# Patient Record
Sex: FEMALE | Race: WHITE | NOT HISPANIC OR LATINO | Employment: OTHER | URBAN - METROPOLITAN AREA
[De-identification: names, ages, dates, MRNs, and addresses within clinical notes are randomized per-mention and may not be internally consistent; named-entity substitution may affect disease eponyms.]

---

## 2021-10-22 ENCOUNTER — HOSPITAL ENCOUNTER (INPATIENT)
Facility: HOSPITAL | Age: 61
LOS: 4 days | Discharge: HOME | End: 2021-10-27
Attending: EMERGENCY MEDICINE | Admitting: INTERNAL MEDICINE

## 2021-10-22 DIAGNOSIS — Z78.9 DECREASED ACTIVITIES OF DAILY LIVING (ADL): ICD-10-CM

## 2021-10-22 DIAGNOSIS — C34.00 MALIGNANT NEOPLASM OF HILUS OF LUNG, UNSPECIFIED LATERALITY (HCC): Primary | ICD-10-CM

## 2021-10-22 DIAGNOSIS — R26.2 DIFFICULTY WALKING: ICD-10-CM

## 2021-10-22 DIAGNOSIS — F10.931 ALCOHOL WITHDRAWAL DELIRIUM (HCC): ICD-10-CM

## 2021-10-22 DIAGNOSIS — F19.10 SUBSTANCE ABUSE (HCC): ICD-10-CM

## 2021-10-22 DIAGNOSIS — F10.229 ALCOHOL INTOXICATION IN ACTIVE ALCOHOLIC WITH COMPLICATION (HCC): ICD-10-CM

## 2021-10-22 LAB
ALBUMIN SERPL-MCNC: 4.7 G/DL (ref 3.5–5.2)
ALBUMIN/GLOB SERPL: 1.6 G/DL
ALP SERPL-CCNC: 111 U/L (ref 39–117)
ALT SERPL W P-5'-P-CCNC: 13 U/L (ref 1–33)
ANION GAP SERPL CALCULATED.3IONS-SCNC: 20.5 MMOL/L (ref 5–15)
APAP SERPL-MCNC: <5 MCG/ML (ref 0–30)
AST SERPL-CCNC: 26 U/L (ref 1–32)
BASOPHILS # BLD AUTO: 0.07 10*3/MM3 (ref 0–0.2)
BASOPHILS NFR BLD AUTO: 1.2 % (ref 0–1.5)
BILIRUB SERPL-MCNC: 0.5 MG/DL (ref 0–1.2)
BUN SERPL-MCNC: 6 MG/DL (ref 6–20)
BUN/CREAT SERPL: 8.8 (ref 7–25)
CALCIUM SPEC-SCNC: 8.9 MG/DL (ref 8.6–10.5)
CHLORIDE SERPL-SCNC: 103 MMOL/L (ref 98–107)
CO2 SERPL-SCNC: 21.5 MMOL/L (ref 22–29)
CREAT SERPL-MCNC: 0.68 MG/DL (ref 0.57–1)
DEPRECATED RDW RBC AUTO: 46 FL (ref 37–54)
EOSINOPHIL # BLD AUTO: 0.05 10*3/MM3 (ref 0–0.4)
EOSINOPHIL NFR BLD AUTO: 0.8 % (ref 0.3–6.2)
ERYTHROCYTE [DISTWIDTH] IN BLOOD BY AUTOMATED COUNT: 13.5 % (ref 12.3–15.4)
ETHANOL BLD-MCNC: 300 MG/DL (ref 0–10)
ETHANOL UR QL: 0.3 %
GFR SERPL CREATININE-BSD FRML MDRD: 89 ML/MIN/1.73
GLOBULIN UR ELPH-MCNC: 3 GM/DL
GLUCOSE SERPL-MCNC: 88 MG/DL (ref 65–99)
HCG INTACT+B SERPL-ACNC: <0.5 MIU/ML
HCT VFR BLD AUTO: 44.9 % (ref 34–46.6)
HGB BLD-MCNC: 15.2 G/DL (ref 12–15.9)
HOLD SPECIMEN: NORMAL
HOLD SPECIMEN: NORMAL
IMM GRANULOCYTES # BLD AUTO: 0.01 10*3/MM3 (ref 0–0.05)
IMM GRANULOCYTES NFR BLD AUTO: 0.2 % (ref 0–0.5)
LYMPHOCYTES # BLD AUTO: 2.92 10*3/MM3 (ref 0.7–3.1)
LYMPHOCYTES NFR BLD AUTO: 49.3 % (ref 19.6–45.3)
MCH RBC QN AUTO: 31.2 PG (ref 26.6–33)
MCHC RBC AUTO-ENTMCNC: 33.9 G/DL (ref 31.5–35.7)
MCV RBC AUTO: 92.2 FL (ref 79–97)
MONOCYTES # BLD AUTO: 0.36 10*3/MM3 (ref 0.1–0.9)
MONOCYTES NFR BLD AUTO: 6.1 % (ref 5–12)
NEUTROPHILS NFR BLD AUTO: 2.51 10*3/MM3 (ref 1.7–7)
NEUTROPHILS NFR BLD AUTO: 42.4 % (ref 42.7–76)
NRBC BLD AUTO-RTO: 0 /100 WBC (ref 0–0.2)
PLATELET # BLD AUTO: 292 10*3/MM3 (ref 140–450)
PMV BLD AUTO: 8.8 FL (ref 6–12)
POTASSIUM SERPL-SCNC: 3.2 MMOL/L (ref 3.5–5.2)
PROT SERPL-MCNC: 7.7 G/DL (ref 6–8.5)
RBC # BLD AUTO: 4.87 10*6/MM3 (ref 3.77–5.28)
SALICYLATES SERPL-MCNC: <0.3 MG/DL
SODIUM SERPL-SCNC: 145 MMOL/L (ref 136–145)
T4 FREE SERPL-MCNC: 0.49 NG/DL (ref 0.93–1.7)
TSH SERPL DL<=0.05 MIU/L-ACNC: 46.77 UIU/ML (ref 0.27–4.2)
WBC # BLD AUTO: 5.92 10*3/MM3 (ref 3.4–10.8)
WHOLE BLOOD HOLD SPECIMEN: NORMAL
WHOLE BLOOD HOLD SPECIMEN: NORMAL

## 2021-10-22 PROCEDURE — 85025 COMPLETE CBC W/AUTO DIFF WBC: CPT | Performed by: EMERGENCY MEDICINE

## 2021-10-22 PROCEDURE — 25010000002 ONDANSETRON PER 1 MG: Performed by: NURSE PRACTITIONER

## 2021-10-22 PROCEDURE — 84702 CHORIONIC GONADOTROPIN TEST: CPT | Performed by: NURSE PRACTITIONER

## 2021-10-22 PROCEDURE — 80143 DRUG ASSAY ACETAMINOPHEN: CPT | Performed by: EMERGENCY MEDICINE

## 2021-10-22 PROCEDURE — 80179 DRUG ASSAY SALICYLATE: CPT | Performed by: EMERGENCY MEDICINE

## 2021-10-22 PROCEDURE — 25010000002 LORAZEPAM PER 2 MG: Performed by: EMERGENCY MEDICINE

## 2021-10-22 PROCEDURE — 84443 ASSAY THYROID STIM HORMONE: CPT | Performed by: NURSE PRACTITIONER

## 2021-10-22 PROCEDURE — 99284 EMERGENCY DEPT VISIT MOD MDM: CPT

## 2021-10-22 PROCEDURE — 80053 COMPREHEN METABOLIC PANEL: CPT | Performed by: EMERGENCY MEDICINE

## 2021-10-22 PROCEDURE — 25010000002 THIAMINE PER 100 MG: Performed by: NURSE PRACTITIONER

## 2021-10-22 PROCEDURE — 84439 ASSAY OF FREE THYROXINE: CPT | Performed by: NURSE PRACTITIONER

## 2021-10-22 PROCEDURE — 82077 ASSAY SPEC XCP UR&BREATH IA: CPT | Performed by: EMERGENCY MEDICINE

## 2021-10-22 RX ORDER — SODIUM CHLORIDE 0.9 % (FLUSH) 0.9 %
10 SYRINGE (ML) INJECTION AS NEEDED
Status: DISCONTINUED | OUTPATIENT
Start: 2021-10-22 | End: 2021-10-27 | Stop reason: HOSPADM

## 2021-10-22 RX ORDER — NICOTINE 21 MG/24HR
1 PATCH, TRANSDERMAL 24 HOURS TRANSDERMAL
Status: DISCONTINUED | OUTPATIENT
Start: 2021-10-23 | End: 2021-10-22

## 2021-10-22 RX ORDER — THIAMINE HYDROCHLORIDE 100 MG/ML
100 INJECTION, SOLUTION INTRAMUSCULAR; INTRAVENOUS ONCE
Status: COMPLETED | OUTPATIENT
Start: 2021-10-22 | End: 2021-10-22

## 2021-10-22 RX ORDER — LORAZEPAM 2 MG/ML
1 INJECTION INTRAMUSCULAR ONCE
Status: COMPLETED | OUTPATIENT
Start: 2021-10-22 | End: 2021-10-22

## 2021-10-22 RX ORDER — ONDANSETRON 2 MG/ML
4 INJECTION INTRAMUSCULAR; INTRAVENOUS ONCE
Status: COMPLETED | OUTPATIENT
Start: 2021-10-22 | End: 2021-10-22

## 2021-10-22 RX ORDER — NICOTINE 21 MG/24HR
1 PATCH, TRANSDERMAL 24 HOURS TRANSDERMAL ONCE
Status: COMPLETED | OUTPATIENT
Start: 2021-10-22 | End: 2021-10-23

## 2021-10-22 RX ADMIN — SODIUM CHLORIDE 500 ML: 9 INJECTION, SOLUTION INTRAVENOUS at 21:39

## 2021-10-22 RX ADMIN — THIAMINE HYDROCHLORIDE 100 MG: 100 INJECTION, SOLUTION INTRAMUSCULAR; INTRAVENOUS at 21:39

## 2021-10-22 RX ADMIN — SODIUM CHLORIDE 500 ML: 9 INJECTION, SOLUTION INTRAVENOUS at 23:21

## 2021-10-22 RX ADMIN — ONDANSETRON 4 MG: 2 INJECTION INTRAMUSCULAR; INTRAVENOUS at 21:38

## 2021-10-22 RX ADMIN — LORAZEPAM 1 MG: 2 INJECTION INTRAMUSCULAR; INTRAVENOUS at 23:21

## 2021-10-22 RX ADMIN — FOLIC ACID 1 MG: 5 INJECTION, SOLUTION INTRAMUSCULAR; INTRAVENOUS; SUBCUTANEOUS at 21:39

## 2021-10-22 RX ADMIN — NICOTINE 1 PATCH: 21 PATCH, EXTENDED RELEASE TRANSDERMAL at 23:04

## 2021-10-23 PROBLEM — R82.5 POSITIVE URINE DRUG SCREEN: Status: ACTIVE | Noted: 2021-10-23

## 2021-10-23 PROBLEM — F11.90 CHRONIC NARCOTIC USE: Chronic | Status: ACTIVE | Noted: 2021-10-23

## 2021-10-23 PROBLEM — E03.9 HYPOTHYROIDISM (ACQUIRED): Chronic | Status: ACTIVE | Noted: 2021-10-23

## 2021-10-23 PROBLEM — I10 PRIMARY HYPERTENSION: Chronic | Status: ACTIVE | Noted: 2021-10-23

## 2021-10-23 PROBLEM — Z72.0 TOBACCO ABUSE: Status: ACTIVE | Noted: 2021-10-23

## 2021-10-23 PROBLEM — F10.930 ALCOHOL WITHDRAWAL SYNDROME WITHOUT COMPLICATION (HCC): Status: ACTIVE | Noted: 2021-10-23

## 2021-10-23 PROBLEM — E87.6 HYPOKALEMIA: Status: ACTIVE | Noted: 2021-10-23

## 2021-10-23 PROBLEM — F10.220 ALCOHOL INTOXICATION IN ACTIVE ALCOHOLIC WITHOUT COMPLICATION: Status: ACTIVE | Noted: 2021-10-23

## 2021-10-23 LAB
ALBUMIN SERPL-MCNC: 4.4 G/DL (ref 3.5–5.2)
ALBUMIN/GLOB SERPL: 1.7 G/DL
ALP SERPL-CCNC: 101 U/L (ref 39–117)
ALT SERPL W P-5'-P-CCNC: 11 U/L (ref 1–33)
AMPHET+METHAMPHET UR QL: POSITIVE
ANION GAP SERPL CALCULATED.3IONS-SCNC: 18.3 MMOL/L (ref 5–15)
AST SERPL-CCNC: 23 U/L (ref 1–32)
BACTERIA UR QL AUTO: ABNORMAL /HPF
BARBITURATES UR QL SCN: NEGATIVE
BENZODIAZ UR QL SCN: NEGATIVE
BILIRUB SERPL-MCNC: 0.7 MG/DL (ref 0–1.2)
BILIRUB UR QL STRIP: NEGATIVE
BUN SERPL-MCNC: 6 MG/DL (ref 6–20)
BUN/CREAT SERPL: 8.8 (ref 7–25)
CALCIUM SPEC-SCNC: 8.3 MG/DL (ref 8.6–10.5)
CANNABINOIDS SERPL QL: NEGATIVE
CHLORIDE SERPL-SCNC: 106 MMOL/L (ref 98–107)
CLARITY UR: CLEAR
CO2 SERPL-SCNC: 20.7 MMOL/L (ref 22–29)
COCAINE UR QL: NEGATIVE
COLOR UR: YELLOW
CREAT SERPL-MCNC: 0.68 MG/DL (ref 0.57–1)
ETHANOL BLD-MCNC: 87 MG/DL (ref 0–10)
ETHANOL UR QL: 0.09 %
GFR SERPL CREATININE-BSD FRML MDRD: 89 ML/MIN/1.73
GLOBULIN UR ELPH-MCNC: 2.6 GM/DL
GLUCOSE SERPL-MCNC: 80 MG/DL (ref 65–99)
GLUCOSE UR STRIP-MCNC: NEGATIVE MG/DL
HGB UR QL STRIP.AUTO: ABNORMAL
HYALINE CASTS UR QL AUTO: ABNORMAL /LPF
KETONES UR QL STRIP: NEGATIVE
LEUKOCYTE ESTERASE UR QL STRIP.AUTO: NEGATIVE
MAGNESIUM SERPL-MCNC: 2 MG/DL (ref 1.6–2.6)
METHADONE UR QL SCN: NEGATIVE
NITRITE UR QL STRIP: NEGATIVE
OPIATES UR QL: NEGATIVE
OXYCODONE UR QL SCN: NEGATIVE
PH UR STRIP.AUTO: 5.5 [PH] (ref 5–8)
PHOSPHATE SERPL-MCNC: 2.5 MG/DL (ref 2.5–4.5)
POTASSIUM SERPL-SCNC: 3.2 MMOL/L (ref 3.5–5.2)
PROT SERPL-MCNC: 7 G/DL (ref 6–8.5)
PROT UR QL STRIP: NEGATIVE
RBC # UR: ABNORMAL /HPF
REF LAB TEST METHOD: ABNORMAL
SODIUM SERPL-SCNC: 145 MMOL/L (ref 136–145)
SP GR UR STRIP: 1.01 (ref 1–1.03)
SQUAMOUS #/AREA URNS HPF: ABNORMAL /HPF
UROBILINOGEN UR QL STRIP: ABNORMAL
WBC UR QL AUTO: ABNORMAL /HPF

## 2021-10-23 PROCEDURE — 80307 DRUG TEST PRSMV CHEM ANLYZR: CPT | Performed by: EMERGENCY MEDICINE

## 2021-10-23 PROCEDURE — 82077 ASSAY SPEC XCP UR&BREATH IA: CPT | Performed by: NURSE PRACTITIONER

## 2021-10-23 PROCEDURE — 25010000002 ONDANSETRON PER 1 MG: Performed by: NURSE PRACTITIONER

## 2021-10-23 PROCEDURE — 25010000002 LORAZEPAM PER 2 MG: Performed by: EMERGENCY MEDICINE

## 2021-10-23 PROCEDURE — 83735 ASSAY OF MAGNESIUM: CPT | Performed by: INTERNAL MEDICINE

## 2021-10-23 PROCEDURE — 84100 ASSAY OF PHOSPHORUS: CPT | Performed by: INTERNAL MEDICINE

## 2021-10-23 PROCEDURE — 80053 COMPREHEN METABOLIC PANEL: CPT | Performed by: NURSE PRACTITIONER

## 2021-10-23 PROCEDURE — 99222 1ST HOSP IP/OBS MODERATE 55: CPT | Performed by: INTERNAL MEDICINE

## 2021-10-23 PROCEDURE — 81001 URINALYSIS AUTO W/SCOPE: CPT | Performed by: NURSE PRACTITIONER

## 2021-10-23 PROCEDURE — 63710000001 PROMETHAZINE PER 25 MG: Performed by: INTERNAL MEDICINE

## 2021-10-23 PROCEDURE — 25010000002 LORAZEPAM PER 2 MG: Performed by: INTERNAL MEDICINE

## 2021-10-23 PROCEDURE — 25010000002 ONDANSETRON PER 1 MG: Performed by: EMERGENCY MEDICINE

## 2021-10-23 RX ORDER — LORAZEPAM 2 MG/ML
0.5 INJECTION INTRAMUSCULAR ONCE
Status: COMPLETED | OUTPATIENT
Start: 2021-10-23 | End: 2021-10-23

## 2021-10-23 RX ORDER — ASPIRIN 81 MG/1
81 TABLET, CHEWABLE ORAL DAILY
COMMUNITY
Start: 2021-07-21 | End: 2022-03-11

## 2021-10-23 RX ORDER — ONDANSETRON 2 MG/ML
4 INJECTION INTRAMUSCULAR; INTRAVENOUS ONCE
Status: COMPLETED | OUTPATIENT
Start: 2021-10-23 | End: 2021-10-23

## 2021-10-23 RX ORDER — ACETAMINOPHEN 325 MG/1
650 TABLET ORAL EVERY 4 HOURS PRN
Status: DISCONTINUED | OUTPATIENT
Start: 2021-10-23 | End: 2021-10-27 | Stop reason: HOSPADM

## 2021-10-23 RX ORDER — LORAZEPAM 2 MG/1
2 TABLET ORAL
Status: DISCONTINUED | OUTPATIENT
Start: 2021-10-23 | End: 2021-10-25

## 2021-10-23 RX ORDER — HYDROCODONE BITARTRATE AND ACETAMINOPHEN 7.5; 325 MG/1; MG/1
1 TABLET ORAL ONCE
Status: COMPLETED | OUTPATIENT
Start: 2021-10-23 | End: 2021-10-23

## 2021-10-23 RX ORDER — LORAZEPAM 2 MG/ML
0.5 INJECTION INTRAMUSCULAR ONCE
Status: DISCONTINUED | OUTPATIENT
Start: 2021-10-23 | End: 2021-10-23

## 2021-10-23 RX ORDER — LORAZEPAM 2 MG/ML
1 INJECTION INTRAMUSCULAR ONCE
Status: DISCONTINUED | OUTPATIENT
Start: 2021-10-23 | End: 2021-10-23

## 2021-10-23 RX ORDER — POTASSIUM CHLORIDE 750 MG/1
40 CAPSULE, EXTENDED RELEASE ORAL ONCE
Status: COMPLETED | OUTPATIENT
Start: 2021-10-23 | End: 2021-10-23

## 2021-10-23 RX ORDER — ALBUTEROL SULFATE 90 UG/1
2 AEROSOL, METERED RESPIRATORY (INHALATION) EVERY 6 HOURS PRN
COMMUNITY
End: 2022-08-25 | Stop reason: SDUPTHER

## 2021-10-23 RX ORDER — CITALOPRAM 20 MG/1
20 TABLET ORAL DAILY
COMMUNITY

## 2021-10-23 RX ORDER — LISINOPRIL 20 MG/1
20 TABLET ORAL DAILY
COMMUNITY
Start: 2021-07-17 | End: 2022-08-25 | Stop reason: SDUPTHER

## 2021-10-23 RX ORDER — AMOXICILLIN 250 MG
2 CAPSULE ORAL 2 TIMES DAILY
Status: DISCONTINUED | OUTPATIENT
Start: 2021-10-23 | End: 2021-10-23

## 2021-10-23 RX ORDER — LEVOTHYROXINE SODIUM 0.1 MG/1
100 TABLET ORAL DAILY
Status: DISCONTINUED | OUTPATIENT
Start: 2021-10-24 | End: 2021-10-27 | Stop reason: HOSPADM

## 2021-10-23 RX ORDER — PROMETHAZINE HYDROCHLORIDE 25 MG/1
25 TABLET ORAL EVERY 6 HOURS PRN
COMMUNITY
End: 2022-03-11

## 2021-10-23 RX ORDER — LORAZEPAM 2 MG/ML
1 INJECTION INTRAMUSCULAR
Status: DISCONTINUED | OUTPATIENT
Start: 2021-10-23 | End: 2021-10-25

## 2021-10-23 RX ORDER — PROMETHAZINE HYDROCHLORIDE 25 MG/1
25 TABLET ORAL EVERY 6 HOURS PRN
Status: DISCONTINUED | OUTPATIENT
Start: 2021-10-23 | End: 2021-10-27 | Stop reason: HOSPADM

## 2021-10-23 RX ORDER — LORAZEPAM 2 MG/ML
2 INJECTION INTRAMUSCULAR
Status: DISCONTINUED | OUTPATIENT
Start: 2021-10-23 | End: 2021-10-25

## 2021-10-23 RX ORDER — LEVOTHYROXINE SODIUM 0.1 MG/1
100 TABLET ORAL DAILY
COMMUNITY
End: 2021-10-27 | Stop reason: HOSPADM

## 2021-10-23 RX ORDER — METHION/INOS/CHOL BT/B COM/LIV 110MG-86MG
100 CAPSULE ORAL DAILY
Status: DISCONTINUED | OUTPATIENT
Start: 2021-10-24 | End: 2021-10-27 | Stop reason: HOSPADM

## 2021-10-23 RX ORDER — FUROSEMIDE 40 MG/1
80 TABLET ORAL 2 TIMES DAILY
COMMUNITY
End: 2022-08-25 | Stop reason: SDUPTHER

## 2021-10-23 RX ORDER — ATORVASTATIN CALCIUM 40 MG/1
40 TABLET, FILM COATED ORAL DAILY
COMMUNITY
Start: 2021-07-17 | End: 2022-08-25 | Stop reason: SDUPTHER

## 2021-10-23 RX ORDER — LORAZEPAM 0.5 MG/1
1 TABLET ORAL
Status: DISCONTINUED | OUTPATIENT
Start: 2021-10-23 | End: 2021-10-25

## 2021-10-23 RX ORDER — OXYCODONE AND ACETAMINOPHEN 10; 325 MG/1; MG/1
1 TABLET ORAL EVERY 6 HOURS PRN
Status: DISCONTINUED | OUTPATIENT
Start: 2021-10-23 | End: 2021-10-27 | Stop reason: HOSPADM

## 2021-10-23 RX ORDER — HYDROCODONE BITARTRATE AND ACETAMINOPHEN 7.5; 325 MG/1; MG/1
1 TABLET ORAL ONCE
Status: DISCONTINUED | OUTPATIENT
Start: 2021-10-23 | End: 2021-10-23

## 2021-10-23 RX ORDER — BISACODYL 5 MG/1
5 TABLET, DELAYED RELEASE ORAL DAILY PRN
Status: DISCONTINUED | OUTPATIENT
Start: 2021-10-23 | End: 2021-10-27 | Stop reason: HOSPADM

## 2021-10-23 RX ORDER — SODIUM CHLORIDE 0.9 % (FLUSH) 0.9 %
10 SYRINGE (ML) INJECTION EVERY 12 HOURS SCHEDULED
Status: DISCONTINUED | OUTPATIENT
Start: 2021-10-23 | End: 2021-10-27 | Stop reason: HOSPADM

## 2021-10-23 RX ORDER — ONDANSETRON 4 MG/1
4 TABLET, FILM COATED ORAL EVERY 6 HOURS PRN
Status: DISCONTINUED | OUTPATIENT
Start: 2021-10-23 | End: 2021-10-27 | Stop reason: HOSPADM

## 2021-10-23 RX ORDER — DILTIAZEM HYDROCHLORIDE 180 MG/1
180 CAPSULE, COATED, EXTENDED RELEASE ORAL DAILY
COMMUNITY
End: 2022-09-26

## 2021-10-23 RX ORDER — DILTIAZEM HYDROCHLORIDE 180 MG/1
180 CAPSULE, COATED, EXTENDED RELEASE ORAL DAILY
Status: DISCONTINUED | OUTPATIENT
Start: 2021-10-24 | End: 2021-10-27 | Stop reason: HOSPADM

## 2021-10-23 RX ORDER — BISACODYL 10 MG
10 SUPPOSITORY, RECTAL RECTAL DAILY PRN
Status: DISCONTINUED | OUTPATIENT
Start: 2021-10-23 | End: 2021-10-27 | Stop reason: HOSPADM

## 2021-10-23 RX ORDER — SODIUM CHLORIDE 0.9 % (FLUSH) 0.9 %
10 SYRINGE (ML) INJECTION AS NEEDED
Status: DISCONTINUED | OUTPATIENT
Start: 2021-10-23 | End: 2021-10-27 | Stop reason: HOSPADM

## 2021-10-23 RX ORDER — ATORVASTATIN CALCIUM 40 MG/1
40 TABLET, FILM COATED ORAL DAILY
Status: DISCONTINUED | OUTPATIENT
Start: 2021-10-24 | End: 2021-10-27 | Stop reason: HOSPADM

## 2021-10-23 RX ORDER — MULTIPLE VITAMINS W/ MINERALS TAB 9MG-400MCG
1 TAB ORAL DAILY
Status: DISCONTINUED | OUTPATIENT
Start: 2021-10-24 | End: 2021-10-27 | Stop reason: HOSPADM

## 2021-10-23 RX ORDER — ASPIRIN 81 MG/1
81 TABLET, CHEWABLE ORAL DAILY
Status: DISCONTINUED | OUTPATIENT
Start: 2021-10-24 | End: 2021-10-27 | Stop reason: HOSPADM

## 2021-10-23 RX ORDER — FOLIC ACID 1 MG/1
1 TABLET ORAL DAILY
Status: DISCONTINUED | OUTPATIENT
Start: 2021-10-24 | End: 2021-10-27 | Stop reason: HOSPADM

## 2021-10-23 RX ORDER — CITALOPRAM 20 MG/1
10 TABLET ORAL DAILY
Status: DISCONTINUED | OUTPATIENT
Start: 2021-10-24 | End: 2021-10-27 | Stop reason: HOSPADM

## 2021-10-23 RX ORDER — ALPRAZOLAM 1 MG/1
1 TABLET ORAL 3 TIMES DAILY
COMMUNITY
Start: 2021-07-17 | End: 2021-10-27 | Stop reason: HOSPADM

## 2021-10-23 RX ORDER — POLYETHYLENE GLYCOL 3350 17 G/17G
17 POWDER, FOR SOLUTION ORAL DAILY PRN
Status: DISCONTINUED | OUTPATIENT
Start: 2021-10-23 | End: 2021-10-27 | Stop reason: HOSPADM

## 2021-10-23 RX ORDER — OXYCODONE AND ACETAMINOPHEN 10; 325 MG/1; MG/1
1 TABLET ORAL EVERY 6 HOURS PRN
COMMUNITY
End: 2021-10-27 | Stop reason: HOSPADM

## 2021-10-23 RX ADMIN — SODIUM CHLORIDE, PRESERVATIVE FREE 10 ML: 5 INJECTION INTRAVENOUS at 23:24

## 2021-10-23 RX ADMIN — LORAZEPAM 0.5 MG: 2 INJECTION INTRAMUSCULAR; INTRAVENOUS at 07:34

## 2021-10-23 RX ADMIN — LORAZEPAM 2 MG: 2 TABLET ORAL at 12:50

## 2021-10-23 RX ADMIN — ACETAMINOPHEN 650 MG: 325 TABLET ORAL at 11:11

## 2021-10-23 RX ADMIN — SODIUM CHLORIDE, PRESERVATIVE FREE 10 ML: 5 INJECTION INTRAVENOUS at 11:11

## 2021-10-23 RX ADMIN — POTASSIUM CHLORIDE 40 MEQ: 750 CAPSULE, EXTENDED RELEASE ORAL at 11:11

## 2021-10-23 RX ADMIN — LORAZEPAM 0.5 MG: 2 INJECTION INTRAMUSCULAR; INTRAVENOUS at 04:42

## 2021-10-23 RX ADMIN — HYDROCODONE BITARTRATE AND ACETAMINOPHEN 1 TABLET: 7.5; 325 TABLET ORAL at 07:34

## 2021-10-23 RX ADMIN — ONDANSETRON 4 MG: 2 INJECTION INTRAMUSCULAR; INTRAVENOUS at 02:39

## 2021-10-23 RX ADMIN — LORAZEPAM 1 MG: 2 INJECTION INTRAMUSCULAR; INTRAVENOUS at 20:09

## 2021-10-23 RX ADMIN — PROMETHAZINE HYDROCHLORIDE 25 MG: 25 TABLET ORAL at 20:15

## 2021-10-23 RX ADMIN — ONDANSETRON 4 MG: 2 INJECTION INTRAMUSCULAR; INTRAVENOUS at 04:40

## 2021-10-24 PROBLEM — E83.39 HYPOPHOSPHATEMIA: Status: ACTIVE | Noted: 2021-10-24

## 2021-10-24 LAB
ANION GAP SERPL CALCULATED.3IONS-SCNC: 8.1 MMOL/L (ref 5–15)
BASOPHILS # BLD AUTO: 0.03 10*3/MM3 (ref 0–0.2)
BASOPHILS NFR BLD AUTO: 0.6 % (ref 0–1.5)
BUN SERPL-MCNC: 8 MG/DL (ref 6–20)
BUN/CREAT SERPL: 9.5 (ref 7–25)
CALCIUM SPEC-SCNC: 8.5 MG/DL (ref 8.6–10.5)
CHLORIDE SERPL-SCNC: 100 MMOL/L (ref 98–107)
CO2 SERPL-SCNC: 26.9 MMOL/L (ref 22–29)
CREAT SERPL-MCNC: 0.84 MG/DL (ref 0.57–1)
DEPRECATED RDW RBC AUTO: 45.1 FL (ref 37–54)
EOSINOPHIL # BLD AUTO: 0.11 10*3/MM3 (ref 0–0.4)
EOSINOPHIL NFR BLD AUTO: 2.2 % (ref 0.3–6.2)
ERYTHROCYTE [DISTWIDTH] IN BLOOD BY AUTOMATED COUNT: 13.2 % (ref 12.3–15.4)
GFR SERPL CREATININE-BSD FRML MDRD: 69 ML/MIN/1.73
GLUCOSE SERPL-MCNC: 93 MG/DL (ref 65–99)
HCT VFR BLD AUTO: 41.4 % (ref 34–46.6)
HGB BLD-MCNC: 13.9 G/DL (ref 12–15.9)
IMM GRANULOCYTES # BLD AUTO: 0.01 10*3/MM3 (ref 0–0.05)
IMM GRANULOCYTES NFR BLD AUTO: 0.2 % (ref 0–0.5)
LYMPHOCYTES # BLD AUTO: 2.13 10*3/MM3 (ref 0.7–3.1)
LYMPHOCYTES NFR BLD AUTO: 43 % (ref 19.6–45.3)
MAGNESIUM SERPL-MCNC: 1.8 MG/DL (ref 1.6–2.6)
MCH RBC QN AUTO: 31.4 PG (ref 26.6–33)
MCHC RBC AUTO-ENTMCNC: 33.6 G/DL (ref 31.5–35.7)
MCV RBC AUTO: 93.7 FL (ref 79–97)
MONOCYTES # BLD AUTO: 0.28 10*3/MM3 (ref 0.1–0.9)
MONOCYTES NFR BLD AUTO: 5.7 % (ref 5–12)
NEUTROPHILS NFR BLD AUTO: 2.39 10*3/MM3 (ref 1.7–7)
NEUTROPHILS NFR BLD AUTO: 48.3 % (ref 42.7–76)
NRBC BLD AUTO-RTO: 0 /100 WBC (ref 0–0.2)
PHOSPHATE SERPL-MCNC: 1.8 MG/DL (ref 2.5–4.5)
PLATELET # BLD AUTO: 212 10*3/MM3 (ref 140–450)
PMV BLD AUTO: 9.2 FL (ref 6–12)
POTASSIUM SERPL-SCNC: 3.7 MMOL/L (ref 3.5–5.2)
RBC # BLD AUTO: 4.42 10*6/MM3 (ref 3.77–5.28)
SODIUM SERPL-SCNC: 135 MMOL/L (ref 136–145)
WBC # BLD AUTO: 4.95 10*3/MM3 (ref 3.4–10.8)

## 2021-10-24 PROCEDURE — 99233 SBSQ HOSP IP/OBS HIGH 50: CPT | Performed by: INTERNAL MEDICINE

## 2021-10-24 PROCEDURE — 25010000002 LORAZEPAM PER 2 MG: Performed by: INTERNAL MEDICINE

## 2021-10-24 PROCEDURE — 85025 COMPLETE CBC W/AUTO DIFF WBC: CPT | Performed by: INTERNAL MEDICINE

## 2021-10-24 PROCEDURE — 84100 ASSAY OF PHOSPHORUS: CPT | Performed by: INTERNAL MEDICINE

## 2021-10-24 PROCEDURE — 63710000001 ONDANSETRON PER 8 MG: Performed by: INTERNAL MEDICINE

## 2021-10-24 PROCEDURE — 83735 ASSAY OF MAGNESIUM: CPT | Performed by: INTERNAL MEDICINE

## 2021-10-24 PROCEDURE — 80048 BASIC METABOLIC PNL TOTAL CA: CPT | Performed by: INTERNAL MEDICINE

## 2021-10-24 PROCEDURE — 36415 COLL VENOUS BLD VENIPUNCTURE: CPT | Performed by: INTERNAL MEDICINE

## 2021-10-24 RX ORDER — FENTANYL/ROPIVACAINE/NS/PF 2-625MCG/1
15 PLASTIC BAG, INJECTION (ML) EPIDURAL ONCE
Status: COMPLETED | OUTPATIENT
Start: 2021-10-24 | End: 2021-10-24

## 2021-10-24 RX ADMIN — Medication 100 MG: at 08:48

## 2021-10-24 RX ADMIN — LORAZEPAM 1 MG: 2 INJECTION INTRAMUSCULAR; INTRAVENOUS at 01:51

## 2021-10-24 RX ADMIN — DILTIAZEM HYDROCHLORIDE 180 MG: 180 CAPSULE, COATED, EXTENDED RELEASE ORAL at 08:49

## 2021-10-24 RX ADMIN — LORAZEPAM 1 MG: 0.5 TABLET ORAL at 23:51

## 2021-10-24 RX ADMIN — LEVOTHYROXINE SODIUM 100 MCG: 0.1 TABLET ORAL at 08:49

## 2021-10-24 RX ADMIN — CITALOPRAM HYDROBROMIDE 10 MG: 20 TABLET ORAL at 08:49

## 2021-10-24 RX ADMIN — OXYCODONE HYDROCHLORIDE AND ACETAMINOPHEN 1 TABLET: 10; 325 TABLET ORAL at 21:15

## 2021-10-24 RX ADMIN — OXYCODONE HYDROCHLORIDE AND ACETAMINOPHEN 1 TABLET: 10; 325 TABLET ORAL at 14:38

## 2021-10-24 RX ADMIN — SODIUM CHLORIDE, PRESERVATIVE FREE 10 ML: 5 INJECTION INTRAVENOUS at 21:15

## 2021-10-24 RX ADMIN — ASPIRIN 81 MG: 81 TABLET, CHEWABLE ORAL at 08:49

## 2021-10-24 RX ADMIN — Medication 15 MMOL: at 10:00

## 2021-10-24 RX ADMIN — OXYCODONE HYDROCHLORIDE AND ACETAMINOPHEN 1 TABLET: 10; 325 TABLET ORAL at 05:11

## 2021-10-24 RX ADMIN — ATORVASTATIN CALCIUM 40 MG: 40 TABLET, FILM COATED ORAL at 08:49

## 2021-10-24 RX ADMIN — SODIUM CHLORIDE, PRESERVATIVE FREE 10 ML: 5 INJECTION INTRAVENOUS at 08:49

## 2021-10-24 RX ADMIN — ONDANSETRON HYDROCHLORIDE 4 MG: 4 TABLET, FILM COATED ORAL at 14:38

## 2021-10-24 RX ADMIN — ONDANSETRON HYDROCHLORIDE 4 MG: 4 TABLET, FILM COATED ORAL at 01:52

## 2021-10-24 RX ADMIN — MULTIPLE VITAMINS W/ MINERALS TAB 1 TABLET: TAB at 08:49

## 2021-10-24 RX ADMIN — FOLIC ACID 1 MG: 1 TABLET ORAL at 08:49

## 2021-10-25 LAB
ANION GAP SERPL CALCULATED.3IONS-SCNC: 12.7 MMOL/L (ref 5–15)
BUN SERPL-MCNC: 11 MG/DL (ref 6–20)
BUN/CREAT SERPL: 12.5 (ref 7–25)
CALCIUM SPEC-SCNC: 8.5 MG/DL (ref 8.6–10.5)
CHLORIDE SERPL-SCNC: 103 MMOL/L (ref 98–107)
CO2 SERPL-SCNC: 23.3 MMOL/L (ref 22–29)
CREAT SERPL-MCNC: 0.88 MG/DL (ref 0.57–1)
DEPRECATED RDW RBC AUTO: 46.1 FL (ref 37–54)
ERYTHROCYTE [DISTWIDTH] IN BLOOD BY AUTOMATED COUNT: 13.2 % (ref 12.3–15.4)
GFR SERPL CREATININE-BSD FRML MDRD: 66 ML/MIN/1.73
GLUCOSE SERPL-MCNC: 118 MG/DL (ref 65–99)
HCT VFR BLD AUTO: 39.9 % (ref 34–46.6)
HGB BLD-MCNC: 13 G/DL (ref 12–15.9)
MCH RBC QN AUTO: 31 PG (ref 26.6–33)
MCHC RBC AUTO-ENTMCNC: 32.6 G/DL (ref 31.5–35.7)
MCV RBC AUTO: 95.2 FL (ref 79–97)
PHOSPHATE SERPL-MCNC: 2.5 MG/DL (ref 2.5–4.5)
PLATELET # BLD AUTO: 198 10*3/MM3 (ref 140–450)
PMV BLD AUTO: 9.3 FL (ref 6–12)
POTASSIUM SERPL-SCNC: 3.7 MMOL/L (ref 3.5–5.2)
RBC # BLD AUTO: 4.19 10*6/MM3 (ref 3.77–5.28)
SODIUM SERPL-SCNC: 139 MMOL/L (ref 136–145)
WBC # BLD AUTO: 4.65 10*3/MM3 (ref 3.4–10.8)

## 2021-10-25 PROCEDURE — 84100 ASSAY OF PHOSPHORUS: CPT | Performed by: INTERNAL MEDICINE

## 2021-10-25 PROCEDURE — 97165 OT EVAL LOW COMPLEX 30 MIN: CPT

## 2021-10-25 PROCEDURE — 80048 BASIC METABOLIC PNL TOTAL CA: CPT | Performed by: INTERNAL MEDICINE

## 2021-10-25 PROCEDURE — 99232 SBSQ HOSP IP/OBS MODERATE 35: CPT | Performed by: INTERNAL MEDICINE

## 2021-10-25 PROCEDURE — 97161 PT EVAL LOW COMPLEX 20 MIN: CPT

## 2021-10-25 PROCEDURE — 85027 COMPLETE CBC AUTOMATED: CPT | Performed by: INTERNAL MEDICINE

## 2021-10-25 RX ORDER — NICOTINE 21 MG/24HR
1 PATCH, TRANSDERMAL 24 HOURS TRANSDERMAL
Status: DISCONTINUED | OUTPATIENT
Start: 2021-10-25 | End: 2021-10-27 | Stop reason: HOSPADM

## 2021-10-25 RX ORDER — ALPRAZOLAM 1 MG/1
1 TABLET ORAL 3 TIMES DAILY PRN
Status: DISCONTINUED | OUTPATIENT
Start: 2021-10-25 | End: 2021-10-27 | Stop reason: HOSPADM

## 2021-10-25 RX ADMIN — ALPRAZOLAM 1 MG: 1 TABLET ORAL at 14:42

## 2021-10-25 RX ADMIN — ATORVASTATIN CALCIUM 40 MG: 40 TABLET, FILM COATED ORAL at 09:44

## 2021-10-25 RX ADMIN — DILTIAZEM HYDROCHLORIDE 180 MG: 180 CAPSULE, COATED, EXTENDED RELEASE ORAL at 09:43

## 2021-10-25 RX ADMIN — NICOTINE 1 PATCH: 21 PATCH, EXTENDED RELEASE TRANSDERMAL at 14:42

## 2021-10-25 RX ADMIN — ALPRAZOLAM 1 MG: 1 TABLET ORAL at 22:37

## 2021-10-25 RX ADMIN — FOLIC ACID 1 MG: 1 TABLET ORAL at 09:44

## 2021-10-25 RX ADMIN — LEVOTHYROXINE SODIUM 100 MCG: 0.1 TABLET ORAL at 09:44

## 2021-10-25 RX ADMIN — ASPIRIN 81 MG: 81 TABLET, CHEWABLE ORAL at 09:43

## 2021-10-25 RX ADMIN — SODIUM CHLORIDE, PRESERVATIVE FREE 10 ML: 5 INJECTION INTRAVENOUS at 09:44

## 2021-10-25 RX ADMIN — SODIUM CHLORIDE, PRESERVATIVE FREE 10 ML: 5 INJECTION INTRAVENOUS at 20:03

## 2021-10-25 RX ADMIN — CITALOPRAM HYDROBROMIDE 10 MG: 20 TABLET ORAL at 09:44

## 2021-10-25 RX ADMIN — OXYCODONE HYDROCHLORIDE AND ACETAMINOPHEN 1 TABLET: 10; 325 TABLET ORAL at 09:43

## 2021-10-25 RX ADMIN — Medication 100 MG: at 09:43

## 2021-10-25 RX ADMIN — OXYCODONE HYDROCHLORIDE AND ACETAMINOPHEN 1 TABLET: 10; 325 TABLET ORAL at 15:57

## 2021-10-25 RX ADMIN — OXYCODONE HYDROCHLORIDE AND ACETAMINOPHEN 1 TABLET: 10; 325 TABLET ORAL at 21:27

## 2021-10-25 RX ADMIN — LORAZEPAM 1 MG: 0.5 TABLET ORAL at 01:48

## 2021-10-25 RX ADMIN — OXYCODONE HYDROCHLORIDE AND ACETAMINOPHEN 1 TABLET: 10; 325 TABLET ORAL at 03:18

## 2021-10-25 RX ADMIN — MULTIPLE VITAMINS W/ MINERALS TAB 1 TABLET: TAB at 09:44

## 2021-10-26 PROCEDURE — 99232 SBSQ HOSP IP/OBS MODERATE 35: CPT | Performed by: INTERNAL MEDICINE

## 2021-10-26 PROCEDURE — 63710000001 ONDANSETRON PER 8 MG: Performed by: INTERNAL MEDICINE

## 2021-10-26 RX ADMIN — OXYCODONE HYDROCHLORIDE AND ACETAMINOPHEN 1 TABLET: 10; 325 TABLET ORAL at 02:58

## 2021-10-26 RX ADMIN — OXYCODONE HYDROCHLORIDE AND ACETAMINOPHEN 1 TABLET: 10; 325 TABLET ORAL at 09:28

## 2021-10-26 RX ADMIN — OXYCODONE HYDROCHLORIDE AND ACETAMINOPHEN 1 TABLET: 10; 325 TABLET ORAL at 21:32

## 2021-10-26 RX ADMIN — ATORVASTATIN CALCIUM 40 MG: 40 TABLET, FILM COATED ORAL at 08:52

## 2021-10-26 RX ADMIN — DILTIAZEM HYDROCHLORIDE 180 MG: 180 CAPSULE, COATED, EXTENDED RELEASE ORAL at 08:51

## 2021-10-26 RX ADMIN — OXYCODONE HYDROCHLORIDE AND ACETAMINOPHEN 1 TABLET: 10; 325 TABLET ORAL at 15:29

## 2021-10-26 RX ADMIN — ASPIRIN 81 MG: 81 TABLET, CHEWABLE ORAL at 08:50

## 2021-10-26 RX ADMIN — ONDANSETRON HYDROCHLORIDE 4 MG: 4 TABLET, FILM COATED ORAL at 07:04

## 2021-10-26 RX ADMIN — CITALOPRAM HYDROBROMIDE 10 MG: 20 TABLET ORAL at 08:51

## 2021-10-26 RX ADMIN — LEVOTHYROXINE SODIUM 100 MCG: 0.1 TABLET ORAL at 08:52

## 2021-10-26 RX ADMIN — SODIUM CHLORIDE, PRESERVATIVE FREE 10 ML: 5 INJECTION INTRAVENOUS at 07:58

## 2021-10-26 RX ADMIN — Medication 100 MG: at 08:51

## 2021-10-26 RX ADMIN — NICOTINE 1 PATCH: 21 PATCH, EXTENDED RELEASE TRANSDERMAL at 09:00

## 2021-10-26 RX ADMIN — FOLIC ACID 1 MG: 1 TABLET ORAL at 08:52

## 2021-10-26 RX ADMIN — ALPRAZOLAM 1 MG: 1 TABLET ORAL at 06:26

## 2021-10-26 RX ADMIN — SODIUM CHLORIDE, PRESERVATIVE FREE 10 ML: 5 INJECTION INTRAVENOUS at 20:03

## 2021-10-26 RX ADMIN — MULTIPLE VITAMINS W/ MINERALS TAB 1 TABLET: TAB at 08:53

## 2021-10-26 RX ADMIN — ALPRAZOLAM 1 MG: 1 TABLET ORAL at 21:32

## 2021-10-27 VITALS
BODY MASS INDEX: 25.89 KG/M2 | TEMPERATURE: 98.1 F | OXYGEN SATURATION: 100 % | DIASTOLIC BLOOD PRESSURE: 79 MMHG | HEIGHT: 64 IN | RESPIRATION RATE: 16 BRPM | HEART RATE: 75 BPM | SYSTOLIC BLOOD PRESSURE: 135 MMHG | WEIGHT: 151.68 LBS

## 2021-10-27 PROBLEM — E87.6 HYPOKALEMIA: Status: RESOLVED | Noted: 2021-10-23 | Resolved: 2021-10-27

## 2021-10-27 PROBLEM — E83.39 HYPOPHOSPHATEMIA: Status: RESOLVED | Noted: 2021-10-24 | Resolved: 2021-10-27

## 2021-10-27 PROBLEM — F10.930 ALCOHOL WITHDRAWAL SYNDROME WITHOUT COMPLICATION (HCC): Status: RESOLVED | Noted: 2021-10-23 | Resolved: 2021-10-27

## 2021-10-27 PROCEDURE — 99239 HOSP IP/OBS DSCHRG MGMT >30: CPT | Performed by: INTERNAL MEDICINE

## 2021-10-27 RX ORDER — MULTIPLE VITAMINS W/ MINERALS TAB 9MG-400MCG
1 TAB ORAL DAILY
Qty: 30 TABLET | Refills: 0 | Status: SHIPPED | OUTPATIENT
Start: 2021-10-28 | End: 2021-11-27

## 2021-10-27 RX ORDER — FOLIC ACID 1 MG/1
1 TABLET ORAL DAILY
Qty: 30 TABLET | Refills: 0 | Status: SHIPPED | OUTPATIENT
Start: 2021-10-28 | End: 2021-11-27

## 2021-10-27 RX ORDER — NICOTINE 21 MG/24HR
1 PATCH, TRANSDERMAL 24 HOURS TRANSDERMAL
Qty: 14 PATCH | Refills: 0 | Status: SHIPPED | OUTPATIENT
Start: 2021-10-28 | End: 2021-11-11

## 2021-10-27 RX ORDER — LEVOTHYROXINE SODIUM 112 UG/1
112 TABLET ORAL DAILY
Qty: 30 TABLET | Refills: 0 | Status: SHIPPED | OUTPATIENT
Start: 2021-10-27 | End: 2022-03-11

## 2021-10-27 RX ORDER — ALPRAZOLAM 0.25 MG/1
0.25 TABLET ORAL 3 TIMES DAILY PRN
Qty: 9 TABLET | Refills: 0 | Status: SHIPPED | OUTPATIENT
Start: 2021-10-27 | End: 2021-10-30

## 2021-10-27 RX ORDER — METHION/INOS/CHOL BT/B COM/LIV 110MG-86MG
100 CAPSULE ORAL DAILY
Qty: 30 TABLET | Refills: 0 | Status: SHIPPED | OUTPATIENT
Start: 2021-10-28 | End: 2021-11-27

## 2021-10-27 RX ORDER — HYDROCODONE BITARTRATE AND ACETAMINOPHEN 5; 325 MG/1; MG/1
1 TABLET ORAL EVERY 8 HOURS PRN
Qty: 9 TABLET | Refills: 0 | Status: SHIPPED | OUTPATIENT
Start: 2021-10-27 | End: 2021-10-30

## 2021-10-27 RX ADMIN — SODIUM CHLORIDE, PRESERVATIVE FREE 10 ML: 5 INJECTION INTRAVENOUS at 08:25

## 2021-10-27 RX ADMIN — ALPRAZOLAM 1 MG: 1 TABLET ORAL at 07:33

## 2021-10-27 RX ADMIN — MULTIPLE VITAMINS W/ MINERALS TAB 1 TABLET: TAB at 08:24

## 2021-10-27 RX ADMIN — Medication 100 MG: at 08:24

## 2021-10-27 RX ADMIN — ATORVASTATIN CALCIUM 40 MG: 40 TABLET, FILM COATED ORAL at 08:24

## 2021-10-27 RX ADMIN — OXYCODONE HYDROCHLORIDE AND ACETAMINOPHEN 1 TABLET: 10; 325 TABLET ORAL at 02:23

## 2021-10-27 RX ADMIN — DILTIAZEM HYDROCHLORIDE 180 MG: 180 CAPSULE, COATED, EXTENDED RELEASE ORAL at 08:25

## 2021-10-27 RX ADMIN — ASPIRIN 81 MG: 81 TABLET, CHEWABLE ORAL at 08:24

## 2021-10-27 RX ADMIN — OXYCODONE HYDROCHLORIDE AND ACETAMINOPHEN 1 TABLET: 10; 325 TABLET ORAL at 08:25

## 2021-10-27 RX ADMIN — NICOTINE 1 PATCH: 21 PATCH, EXTENDED RELEASE TRANSDERMAL at 08:26

## 2021-10-27 RX ADMIN — LEVOTHYROXINE SODIUM 100 MCG: 0.1 TABLET ORAL at 08:32

## 2021-10-27 RX ADMIN — POLYETHYLENE GLYCOL 3350 17 G: 17 POWDER, FOR SOLUTION ORAL at 01:28

## 2021-10-27 RX ADMIN — CITALOPRAM HYDROBROMIDE 10 MG: 20 TABLET ORAL at 08:32

## 2021-10-27 RX ADMIN — BISACODYL 5 MG: 5 TABLET, COATED ORAL at 15:13

## 2021-10-27 RX ADMIN — OXYCODONE HYDROCHLORIDE AND ACETAMINOPHEN 1 TABLET: 10; 325 TABLET ORAL at 14:40

## 2021-10-27 RX ADMIN — FOLIC ACID 1 MG: 1 TABLET ORAL at 08:24

## 2021-10-28 ENCOUNTER — READMISSION MANAGEMENT (OUTPATIENT)
Dept: CALL CENTER | Facility: HOSPITAL | Age: 61
End: 2021-10-28

## 2021-10-28 NOTE — OUTREACH NOTE
Prep Survey      Responses   [facility] facility patient discharged from? [facility]   Is LACE score < 7 ? No   Emergency Room discharge w/ pulse ox? No   Eligibility Not Eligible   What are the reasons patient is not eligible? Other   Does the patient have one of the following disease processes/diagnoses(primary or secondary)? Other   Prep survey completed? Yes          Esther Colby RN

## 2022-03-11 ENCOUNTER — HOSPITAL ENCOUNTER (EMERGENCY)
Facility: HOSPITAL | Age: 62
Discharge: HOME | End: 2022-03-11
Attending: EMERGENCY MEDICINE | Admitting: EMERGENCY MEDICINE

## 2022-03-11 ENCOUNTER — APPOINTMENT (OUTPATIENT)
Dept: GENERAL RADIOLOGY | Facility: HOSPITAL | Age: 62
End: 2022-03-11

## 2022-03-11 VITALS
HEIGHT: 64 IN | RESPIRATION RATE: 18 BRPM | SYSTOLIC BLOOD PRESSURE: 119 MMHG | BODY MASS INDEX: 35.12 KG/M2 | DIASTOLIC BLOOD PRESSURE: 76 MMHG | WEIGHT: 205.69 LBS | TEMPERATURE: 98.1 F | OXYGEN SATURATION: 93 % | HEART RATE: 83 BPM

## 2022-03-11 DIAGNOSIS — C34.90 MALIGNANT NEOPLASM OF LUNG, UNSPECIFIED LATERALITY, UNSPECIFIED PART OF LUNG: ICD-10-CM

## 2022-03-11 DIAGNOSIS — J42 CHRONIC BRONCHITIS, UNSPECIFIED CHRONIC BRONCHITIS TYPE: ICD-10-CM

## 2022-03-11 DIAGNOSIS — R60.9 PERIPHERAL EDEMA: Primary | ICD-10-CM

## 2022-03-11 LAB
ALBUMIN SERPL-MCNC: 5 G/DL (ref 3.5–5.2)
ALBUMIN/GLOB SERPL: 1.8 G/DL
ALP SERPL-CCNC: 77 U/L (ref 39–117)
ALT SERPL W P-5'-P-CCNC: 14 U/L (ref 1–33)
ANION GAP SERPL CALCULATED.3IONS-SCNC: 11.9 MMOL/L (ref 5–15)
AST SERPL-CCNC: 19 U/L (ref 1–32)
BASOPHILS # BLD AUTO: 0.03 10*3/MM3 (ref 0–0.2)
BASOPHILS NFR BLD AUTO: 0.4 % (ref 0–1.5)
BILIRUB SERPL-MCNC: 0.3 MG/DL (ref 0–1.2)
BUN SERPL-MCNC: 24 MG/DL (ref 6–20)
BUN/CREAT SERPL: 22.9 (ref 7–25)
CALCIUM SPEC-SCNC: 9.9 MG/DL (ref 8.6–10.5)
CHLORIDE SERPL-SCNC: 98 MMOL/L (ref 98–107)
CO2 SERPL-SCNC: 30.1 MMOL/L (ref 22–29)
CREAT SERPL-MCNC: 1.05 MG/DL (ref 0.57–1)
DEPRECATED RDW RBC AUTO: 43.9 FL (ref 37–54)
EGFRCR SERPLBLD CKD-EPI 2021: 61.3 ML/MIN/1.73
EOSINOPHIL # BLD AUTO: 0.28 10*3/MM3 (ref 0–0.4)
EOSINOPHIL NFR BLD AUTO: 4.2 % (ref 0.3–6.2)
ERYTHROCYTE [DISTWIDTH] IN BLOOD BY AUTOMATED COUNT: 13.2 % (ref 12.3–15.4)
GLOBULIN UR ELPH-MCNC: 2.8 GM/DL
GLUCOSE SERPL-MCNC: 99 MG/DL (ref 65–99)
HCT VFR BLD AUTO: 40.9 % (ref 34–46.6)
HGB BLD-MCNC: 13.1 G/DL (ref 12–15.9)
HOLD SPECIMEN: NORMAL
HOLD SPECIMEN: NORMAL
IMM GRANULOCYTES # BLD AUTO: 0.02 10*3/MM3 (ref 0–0.05)
IMM GRANULOCYTES NFR BLD AUTO: 0.3 % (ref 0–0.5)
LYMPHOCYTES # BLD AUTO: 1.98 10*3/MM3 (ref 0.7–3.1)
LYMPHOCYTES NFR BLD AUTO: 29.4 % (ref 19.6–45.3)
MCH RBC QN AUTO: 29 PG (ref 26.6–33)
MCHC RBC AUTO-ENTMCNC: 32 G/DL (ref 31.5–35.7)
MCV RBC AUTO: 90.7 FL (ref 79–97)
MONOCYTES # BLD AUTO: 0.75 10*3/MM3 (ref 0.1–0.9)
MONOCYTES NFR BLD AUTO: 11.1 % (ref 5–12)
NEUTROPHILS NFR BLD AUTO: 3.68 10*3/MM3 (ref 1.7–7)
NEUTROPHILS NFR BLD AUTO: 54.6 % (ref 42.7–76)
NRBC BLD AUTO-RTO: 0 /100 WBC (ref 0–0.2)
NT-PROBNP SERPL-MCNC: 45.8 PG/ML (ref 0–900)
PLATELET # BLD AUTO: 228 10*3/MM3 (ref 140–450)
PMV BLD AUTO: 9.1 FL (ref 6–12)
POTASSIUM SERPL-SCNC: 3.8 MMOL/L (ref 3.5–5.2)
PROT SERPL-MCNC: 7.8 G/DL (ref 6–8.5)
RBC # BLD AUTO: 4.51 10*6/MM3 (ref 3.77–5.28)
SODIUM SERPL-SCNC: 140 MMOL/L (ref 136–145)
T4 FREE SERPL-MCNC: 1.13 NG/DL (ref 0.93–1.7)
TROPONIN T SERPL-MCNC: <0.01 NG/ML (ref 0–0.03)
TSH SERPL DL<=0.05 MIU/L-ACNC: 19.38 UIU/ML (ref 0.27–4.2)
WBC NRBC COR # BLD: 6.74 10*3/MM3 (ref 3.4–10.8)
WHOLE BLOOD HOLD SPECIMEN: NORMAL
WHOLE BLOOD HOLD SPECIMEN: NORMAL

## 2022-03-11 PROCEDURE — 84443 ASSAY THYROID STIM HORMONE: CPT | Performed by: EMERGENCY MEDICINE

## 2022-03-11 PROCEDURE — 94799 UNLISTED PULMONARY SVC/PX: CPT

## 2022-03-11 PROCEDURE — 96374 THER/PROPH/DIAG INJ IV PUSH: CPT

## 2022-03-11 PROCEDURE — 99283 EMERGENCY DEPT VISIT LOW MDM: CPT

## 2022-03-11 PROCEDURE — 36415 COLL VENOUS BLD VENIPUNCTURE: CPT

## 2022-03-11 PROCEDURE — 84439 ASSAY OF FREE THYROXINE: CPT | Performed by: EMERGENCY MEDICINE

## 2022-03-11 PROCEDURE — 25010000002 FUROSEMIDE PER 20 MG: Performed by: EMERGENCY MEDICINE

## 2022-03-11 PROCEDURE — 85025 COMPLETE CBC W/AUTO DIFF WBC: CPT

## 2022-03-11 PROCEDURE — 93005 ELECTROCARDIOGRAM TRACING: CPT | Performed by: EMERGENCY MEDICINE

## 2022-03-11 PROCEDURE — 94640 AIRWAY INHALATION TREATMENT: CPT

## 2022-03-11 PROCEDURE — 84484 ASSAY OF TROPONIN QUANT: CPT

## 2022-03-11 PROCEDURE — 71045 X-RAY EXAM CHEST 1 VIEW: CPT

## 2022-03-11 PROCEDURE — 83880 ASSAY OF NATRIURETIC PEPTIDE: CPT

## 2022-03-11 PROCEDURE — 93005 ELECTROCARDIOGRAM TRACING: CPT

## 2022-03-11 PROCEDURE — 80053 COMPREHEN METABOLIC PANEL: CPT

## 2022-03-11 RX ORDER — ALPRAZOLAM 0.5 MG/1
1 TABLET ORAL 3 TIMES DAILY PRN
COMMUNITY
End: 2022-09-26

## 2022-03-11 RX ORDER — LEVOFLOXACIN 750 MG/1
750 TABLET ORAL DAILY
COMMUNITY
End: 2022-07-18

## 2022-03-11 RX ORDER — LEVOTHYROXINE SODIUM 175 UG/1
150 TABLET ORAL DAILY
COMMUNITY

## 2022-03-11 RX ORDER — SODIUM CHLORIDE 0.9 % (FLUSH) 0.9 %
10 SYRINGE (ML) INJECTION AS NEEDED
Status: DISCONTINUED | OUTPATIENT
Start: 2022-03-11 | End: 2022-03-12 | Stop reason: HOSPADM

## 2022-03-11 RX ORDER — DOCUSATE SODIUM 100 MG/1
100 CAPSULE, LIQUID FILLED ORAL 2 TIMES DAILY PRN
COMMUNITY

## 2022-03-11 RX ORDER — AMLODIPINE BESYLATE 5 MG/1
5 TABLET ORAL DAILY
COMMUNITY
End: 2022-08-25 | Stop reason: SDUPTHER

## 2022-03-11 RX ORDER — OXYCODONE HYDROCHLORIDE 10 MG/1
10 TABLET ORAL EVERY 6 HOURS PRN
COMMUNITY
End: 2022-09-15

## 2022-03-11 RX ORDER — IPRATROPIUM BROMIDE AND ALBUTEROL SULFATE 2.5; .5 MG/3ML; MG/3ML
3 SOLUTION RESPIRATORY (INHALATION)
Status: COMPLETED | OUTPATIENT
Start: 2022-03-11 | End: 2022-03-11

## 2022-03-11 RX ORDER — FUROSEMIDE 10 MG/ML
60 INJECTION INTRAMUSCULAR; INTRAVENOUS ONCE
Status: COMPLETED | OUTPATIENT
Start: 2022-03-11 | End: 2022-03-11

## 2022-03-11 RX ORDER — POTASSIUM CHLORIDE 750 MG/1
10 TABLET, FILM COATED, EXTENDED RELEASE ORAL DAILY
COMMUNITY
End: 2022-08-25 | Stop reason: SDUPTHER

## 2022-03-11 RX ORDER — ONDANSETRON 4 MG/1
4 TABLET, FILM COATED ORAL EVERY 8 HOURS PRN
COMMUNITY

## 2022-03-11 RX ORDER — POTASSIUM CHLORIDE 750 MG/1
40 CAPSULE, EXTENDED RELEASE ORAL ONCE
Status: COMPLETED | OUTPATIENT
Start: 2022-03-11 | End: 2022-03-11

## 2022-03-11 RX ORDER — OXYCODONE AND ACETAMINOPHEN 10; 325 MG/1; MG/1
1 TABLET ORAL EVERY 4 HOURS PRN
COMMUNITY
End: 2022-09-15

## 2022-03-11 RX ADMIN — IPRATROPIUM BROMIDE AND ALBUTEROL SULFATE 3 ML: 2.5; .5 SOLUTION RESPIRATORY (INHALATION) at 23:03

## 2022-03-11 RX ADMIN — IPRATROPIUM BROMIDE AND ALBUTEROL SULFATE 3 ML: 2.5; .5 SOLUTION RESPIRATORY (INHALATION) at 22:35

## 2022-03-11 RX ADMIN — POTASSIUM CHLORIDE 40 MEQ: 750 CAPSULE, EXTENDED RELEASE ORAL at 22:24

## 2022-03-11 RX ADMIN — FUROSEMIDE 60 MG: 10 INJECTION, SOLUTION INTRAMUSCULAR; INTRAVENOUS at 22:25

## 2022-03-11 RX ADMIN — IPRATROPIUM BROMIDE AND ALBUTEROL SULFATE 3 ML: 2.5; .5 SOLUTION RESPIRATORY (INHALATION) at 22:30

## 2022-03-12 NOTE — ED PROVIDER NOTES
"Time: 9:25 PM EST  Arrived by: POONAM  Chief Complaint: Leg Swelling  History provided by:   History is limited by: N/A     History of Present Illness:  Patient is a 61 y.o. year old female that presents to the emergency department with bilateral leg swelling that has been worsening over the past three months. The pt's leg swelling has been intermittent over those three months, and the swelling is secondary to CHF (on Lasix, 2 40mg doses twice daily, and Furosamide). The pt and the son at bedside report that the pt took a dose of Lasix around 1800 today and her leg swelling got \"9-10x better\". Her symptoms are moderate in severity. She has a hx of stage 4 lung cancer with mets to the brain that was resolved. She is on Keytruda and the last time she took Keytruda was two weeks ago. Her oncologist is Dr. Pierre. The pt's son is at bedside.     Endorses swelling to chest and arms. Endorses abdominal distension. States she had chest tightness yesterday, but it has resolved. No CP.     No fever. Endorses chills. Her nonproductive cough is chronic and slightly worse than normal. No hemoptysis.        History provided by:  Patient   used: No        Similar Symptoms Previously: She has been evaluated for these same symptoms before  Recently seen: N/a      Patient Care Team  Primary Care Provider: Michelle Peters MD    Past Medical History:     No Known Allergies  Past Medical History:   Diagnosis Date   • Afib (HCC)    • Cancer (HCC)    • DVT (deep venous thrombosis) (HCC)    • Hypertension    • Pneumonia    • Thyroid disease      Past Surgical History:   Procedure Laterality Date   • CARPAL TUNNEL RELEASE     • HEEL SPUR SURGERY Left      History reviewed. No pertinent family history.    Home Medications:  Prior to Admission medications    Medication Sig Start Date End Date Taking? Authorizing Provider   albuterol sulfate  (90 Base) MCG/ACT inhaler Inhale 2 puffs Every 6 (Six) Hours As Needed.   "  Jus Olmedo MD   ALPRAZolam (XANAX) 0.5 MG tablet Take 1 mg by mouth 3 (Three) Times a Day As Needed for Anxiety.    Jus Olmedo MD   amLODIPine (NORVASC) 5 MG tablet Take 5 mg by mouth Daily.    Jus Olmedo MD   atorvastatin (LIPITOR) 40 MG tablet Take 40 mg by mouth Daily. 7/17/21   Jus Olmedo MD   citalopram (CeleXA) 10 MG tablet Take 10 mg by mouth Daily.    Jus Olmedo MD   dilTIAZem CD (CARDIZEM CD) 180 MG 24 hr capsule Take 180 mg by mouth Daily.    Jus Olmedo MD   docusate sodium (COLACE) 100 MG capsule Take 100 mg by mouth 2 (Two) Times a Day As Needed for Constipation.    Jus Olmedo MD   furosemide (LASIX) 40 MG tablet Take 80 mg by mouth 2 (Two) Times a Day.    Jus Olmedo MD   levoFLOXacin (LEVAQUIN) 750 MG tablet Take 750 mg by mouth Daily. ONE LEFT    Jus Olmedo MD   levothyroxine (SYNTHROID, LEVOTHROID) 150 MCG tablet Take 150 mcg by mouth Daily.    Jus Olmedo MD   lisinopril (PRINIVIL,ZESTRIL) 20 MG tablet Take 20 mg by mouth Daily. 7/17/21   Jus Olmedo MD   ondansetron (ZOFRAN) 4 MG tablet Take 4 mg by mouth Every 8 (Eight) Hours As Needed for Nausea or Vomiting.    Jus Olmedo MD   oxyCODONE (ROXICODONE) 10 MG tablet Take 10 mg by mouth Every 6 (Six) Hours As Needed for Moderate Pain .    Jus Olmedo MD   oxyCODONE-acetaminophen (PERCOCET)  MG per tablet Take 1 tablet by mouth Every 4 (Four) Hours As Needed for Moderate Pain .    Jus Olmedo MD   potassium chloride 10 MEQ CR tablet Take 10 mEq by mouth Daily.    Jus Olmedo MD   aspirin 81 MG chewable tablet Chew 81 mg Daily. 7/21/21 3/11/22  Jus Olmedo MD   levothyroxine (Synthroid) 112 MCG tablet Take 1 tablet by mouth Daily for 30 days.  Patient taking differently: Take 150 mcg by mouth Daily. 10/27/21 3/11/22  Joseph Duckworth MD   promethazine (PHENERGAN) 25 MG tablet Take  "25 mg by mouth Every 6 (Six) Hours As Needed.  3/11/22  Provider, MD Jus        Social History:   Social History     Tobacco Use   • Smoking status: Current Every Day Smoker     Packs/day: 2.50     Years: 35.00     Pack years: 87.50     Types: Cigarettes     Start date: 10/23/1986   • Smokeless tobacco: Never Used   Vaping Use   • Vaping Use: Never used   Substance Use Topics   • Alcohol use: Not Currently   • Drug use: Not Currently         Review of Systems:  Review of Systems   Constitutional: Positive for chills. Negative for diaphoresis and fever.   HENT: Negative for congestion, postnasal drip, rhinorrhea and sore throat.    Eyes: Negative for photophobia.   Respiratory: Positive for cough (Chronic but slightly worse than normal, nonproductive) and chest tightness (Resolved). Negative for shortness of breath.         No hemoptysis   Cardiovascular: Negative for chest pain, palpitations and leg swelling.   Gastrointestinal: Positive for abdominal distention. Negative for abdominal pain, diarrhea, nausea and vomiting.   Genitourinary: Negative for difficulty urinating, dysuria, flank pain, frequency, hematuria and urgency.   Musculoskeletal: Negative for neck pain and neck stiffness.        Reports generalized swelling to chest and arms   Skin: Negative for pallor and rash.   Neurological: Negative for dizziness, syncope, weakness, numbness and headaches.   Hematological: Negative for adenopathy. Does not bruise/bleed easily.   Psychiatric/Behavioral: Negative.         Physical Exam:  /76   Pulse 83   Temp 98.1 °F (36.7 °C) (Oral)   Resp 18   Ht 162.6 cm (64\")   Wt 93.3 kg (205 lb 11 oz)   SpO2 93%   BMI 35.31 kg/m²     Physical Exam  Vitals and nursing note reviewed.   Constitutional:       General: She is not in acute distress.     Appearance: Normal appearance. She is obese. She is not ill-appearing, toxic-appearing or diaphoretic.   HENT:      Head: Normocephalic and atraumatic.      " Mouth/Throat:      Mouth: Mucous membranes are moist.   Eyes:      Pupils: Pupils are equal, round, and reactive to light.   Cardiovascular:      Rate and Rhythm: Normal rate and regular rhythm.      Pulses:           Dorsalis pedis pulses are 1+ on the right side and 2+ on the left side.      Heart sounds: Normal heart sounds. No murmur heard.  Pulmonary:      Effort: Pulmonary effort is normal. No accessory muscle usage, respiratory distress or retractions.      Breath sounds: Examination of the right-upper field reveals wheezing. Examination of the left-upper field reveals wheezing. Examination of the right-middle field reveals wheezing. Examination of the left-middle field reveals wheezing. Examination of the right-lower field reveals wheezing. Examination of the left-lower field reveals wheezing. Decreased breath sounds (Slight) and wheezing (Faint) present. No rhonchi or rales.   Chest:      Comments: Port to right anterior superior chest wall that appears normal with no redness or discharge  Abdominal:      General: Abdomen is flat. There is no distension.      Palpations: Abdomen is soft. There is no mass.      Tenderness: There is no abdominal tenderness. There is no right CVA tenderness, left CVA tenderness, guarding or rebound.      Comments: No rigidity   Musculoskeletal:         General: No swelling, tenderness or deformity.      Cervical back: Neck supple. No tenderness.      Right lower leg: Edema (Trace) present.      Left lower leg: Edema (Trace) present.      Right ankle: Swelling present.      Left ankle: Swelling present.      Right foot: Swelling (Trace) present.      Left foot: Swelling (Trace) present.      Comments: Varicose veins present to BLE   Skin:     General: Skin is warm and dry.      Capillary Refill: Capillary refill takes less than 2 seconds.      Coloration: Skin is not jaundiced or pale.      Findings: No erythema.   Neurological:      General: No focal deficit present.       Mental Status: She is alert and oriented to person, place, and time. Mental status is at baseline.      Sensory: No sensory deficit.      Motor: No weakness.   Psychiatric:         Mood and Affect: Mood normal.         Behavior: Behavior normal.                Medications in the Emergency Department:  Medications   ipratropium-albuterol (DUO-NEB) nebulizer solution 3 mL (3 mL Nebulization Given 3/11/22 2303)   furosemide (LASIX) injection 60 mg (60 mg Intravenous Given 3/11/22 2225)   potassium chloride (MICRO-K) CR capsule 40 mEq (40 mEq Oral Given 3/11/22 2224)        Labs  Lab Results (last 24 hours)     ** No results found for the last 24 hours. **           Imaging:  No Radiology Exams Resulted Within Past 24 Hours    Procedures:  Procedures    Progress                            Medical Decision Making:  MDM  Number of Diagnoses or Management Options  Chronic bronchitis, unspecified chronic bronchitis type (HCC)  Malignant neoplasm of lung, unspecified laterality, unspecified part of lung (HCC)  Peripheral edema  Diagnosis management comments:       I have spoken with this patient.  I have explained the patient's condition, diagnosis and treatment plan based on the information available to me at this time.  I have answered the patient's questions and addressed any concerns.  The patient has a good understanding of the patient's diagnosis condition and treatment plan as can be expected at this point.  The vital signs have been stable.  The patient's condition is stable and appropriate for discharge from the emergency department.     Patient will pursue further outpatient evaluation with the primary care physician or other designated or consulting physicians as outlined in the discharge instruction.  The patient is agreeable to this plan of care and follow-up instructions have been explained in detail.  The patient has received these instructions in written format and has expressed understanding of the discharge  instructions.  The patient is aware that any significant change in condition or worsening of symptoms should prompt immediate return to this or the closest emergency department or call 911       Amount and/or Complexity of Data Reviewed  Clinical lab tests: reviewed  Tests in the radiology section of CPT®: reviewed  Tests in the medicine section of CPT®: reviewed                 Final diagnoses:   Peripheral edema   Malignant neoplasm of lung, unspecified laterality, unspecified part of lung (HCC)   Chronic bronchitis, unspecified chronic bronchitis type (HCC)        Disposition:  ED Disposition     ED Disposition   Discharge    Condition   Stable    Comment   --             Documentation assistance provided by Jg Sanchez acting as scribe for Jeronimo Marcial DO. Information recorded by the scribe was done at my direction and has been verified and validated by me.        Jg Sanchez  03/11/22 2148       Jg Sanchez  03/11/22 2148       Jeronimo Marcial DO  03/13/22 0103

## 2022-03-12 NOTE — DISCHARGE INSTRUCTIONS
Please continue your Lasix twice a day as previously prescribed    Please purchase and wear compression stockings while awake and alert    Please keep your feet elevated above your heart while at rest.    Please use your albuterol nebulizer every 4-6 hours as needed for shortness of breath    Please continue your current antibiotics as prescribed by your oncologist    Please follow-up with your oncologist on Monday to discuss possible benefit from steroids    Return to emergency room for increasing shortness of breath, chest pain, chest pressure, near passing out, passing out, unusual fatigue, worsening edema in the legs or any new symptoms you are concerned with

## 2022-03-15 LAB — QT INTERVAL: 389 MS

## 2022-05-13 ENCOUNTER — HOSPITAL ENCOUNTER (EMERGENCY)
Facility: HOSPITAL | Age: 62
Discharge: HOME | End: 2022-05-13
Attending: EMERGENCY MEDICINE | Admitting: EMERGENCY MEDICINE

## 2022-05-13 ENCOUNTER — APPOINTMENT (OUTPATIENT)
Dept: GENERAL RADIOLOGY | Facility: HOSPITAL | Age: 62
End: 2022-05-13

## 2022-05-13 VITALS
TEMPERATURE: 98.2 F | HEIGHT: 65 IN | HEART RATE: 81 BPM | OXYGEN SATURATION: 95 % | BODY MASS INDEX: 34.12 KG/M2 | SYSTOLIC BLOOD PRESSURE: 115 MMHG | WEIGHT: 204.81 LBS | RESPIRATION RATE: 18 BRPM | DIASTOLIC BLOOD PRESSURE: 70 MMHG

## 2022-05-13 DIAGNOSIS — J20.9 ACUTE BRONCHITIS, UNSPECIFIED ORGANISM: ICD-10-CM

## 2022-05-13 DIAGNOSIS — J44.9 CHRONIC OBSTRUCTIVE PULMONARY DISEASE, UNSPECIFIED COPD TYPE: Primary | ICD-10-CM

## 2022-05-13 DIAGNOSIS — J18.9 PNEUMONIA OF LEFT LOWER LOBE DUE TO INFECTIOUS ORGANISM: ICD-10-CM

## 2022-05-13 DIAGNOSIS — C34.90 MALIGNANT NEOPLASM OF LUNG, UNSPECIFIED LATERALITY, UNSPECIFIED PART OF LUNG: ICD-10-CM

## 2022-05-13 LAB
ALBUMIN SERPL-MCNC: 3.8 G/DL (ref 3.5–5.2)
ALBUMIN/GLOB SERPL: 1.2 G/DL
ALP SERPL-CCNC: 79 U/L (ref 39–117)
ALT SERPL W P-5'-P-CCNC: 23 U/L (ref 1–33)
ANION GAP SERPL CALCULATED.3IONS-SCNC: 10.2 MMOL/L (ref 5–15)
AST SERPL-CCNC: 19 U/L (ref 1–32)
BILIRUB SERPL-MCNC: 0.2 MG/DL (ref 0–1.2)
BLASTS NFR BLD MANUAL: 2 % (ref 0–0)
BUN SERPL-MCNC: 13 MG/DL (ref 8–23)
BUN/CREAT SERPL: 22.4 (ref 7–25)
CALCIUM SPEC-SCNC: 9 MG/DL (ref 8.6–10.5)
CHLORIDE SERPL-SCNC: 104 MMOL/L (ref 98–107)
CO2 SERPL-SCNC: 27.8 MMOL/L (ref 22–29)
CREAT SERPL-MCNC: 0.58 MG/DL (ref 0.57–1)
D-LACTATE SERPL-SCNC: 1.1 MMOL/L (ref 0.5–2)
DEPRECATED RDW RBC AUTO: 45.4 FL (ref 37–54)
EGFRCR SERPLBLD CKD-EPI 2021: 103.7 ML/MIN/1.73
ERYTHROCYTE [DISTWIDTH] IN BLOOD BY AUTOMATED COUNT: 14 % (ref 12.3–15.4)
GLOBULIN UR ELPH-MCNC: 3.2 GM/DL
GLUCOSE SERPL-MCNC: 128 MG/DL (ref 65–99)
HCT VFR BLD AUTO: 35.9 % (ref 34–46.6)
HGB BLD-MCNC: 11.5 G/DL (ref 12–15.9)
HOLD SPECIMEN: NORMAL
HOLD SPECIMEN: NORMAL
LYMPHOCYTES # BLD MANUAL: 1.25 10*3/MM3 (ref 0.7–3.1)
LYMPHOCYTES NFR BLD MANUAL: 5 % (ref 5–12)
MCH RBC QN AUTO: 28.7 PG (ref 26.6–33)
MCHC RBC AUTO-ENTMCNC: 32 G/DL (ref 31.5–35.7)
MCV RBC AUTO: 89.5 FL (ref 79–97)
MONOCYTES # BLD: 0.52 10*3/MM3 (ref 0.1–0.9)
NEUTROPHILS # BLD AUTO: 8.34 10*3/MM3 (ref 1.7–7)
NEUTROPHILS NFR BLD MANUAL: 76 % (ref 42.7–76)
NEUTS BAND NFR BLD MANUAL: 4 % (ref 0–5)
NT-PROBNP SERPL-MCNC: 190.7 PG/ML (ref 0–900)
PLATELET # BLD AUTO: 234 10*3/MM3 (ref 140–450)
PMV BLD AUTO: 9.5 FL (ref 6–12)
POTASSIUM SERPL-SCNC: 4 MMOL/L (ref 3.5–5.2)
PROMYELOCYTES NFR BLD MANUAL: 1 % (ref 0–0)
PROT SERPL-MCNC: 7 G/DL (ref 6–8.5)
RBC # BLD AUTO: 4.01 10*6/MM3 (ref 3.77–5.28)
RBC MORPH BLD: NORMAL
SMALL PLATELETS BLD QL SMEAR: ADEQUATE
SODIUM SERPL-SCNC: 142 MMOL/L (ref 136–145)
TROPONIN T SERPL-MCNC: <0.01 NG/ML (ref 0–0.03)
VARIANT LYMPHS NFR BLD MANUAL: 1 % (ref 0–5)
VARIANT LYMPHS NFR BLD MANUAL: 11 % (ref 19.6–45.3)
WBC MORPH BLD: NORMAL
WBC NRBC COR # BLD: 10.42 10*3/MM3 (ref 3.4–10.8)
WHOLE BLOOD HOLD COAG: NORMAL
WHOLE BLOOD HOLD SPECIMEN: NORMAL

## 2022-05-13 PROCEDURE — 85007 BL SMEAR W/DIFF WBC COUNT: CPT | Performed by: EMERGENCY MEDICINE

## 2022-05-13 PROCEDURE — 93010 ELECTROCARDIOGRAM REPORT: CPT | Performed by: INTERNAL MEDICINE

## 2022-05-13 PROCEDURE — 94640 AIRWAY INHALATION TREATMENT: CPT

## 2022-05-13 PROCEDURE — 71045 X-RAY EXAM CHEST 1 VIEW: CPT

## 2022-05-13 PROCEDURE — 85025 COMPLETE CBC W/AUTO DIFF WBC: CPT | Performed by: EMERGENCY MEDICINE

## 2022-05-13 PROCEDURE — 83605 ASSAY OF LACTIC ACID: CPT | Performed by: EMERGENCY MEDICINE

## 2022-05-13 PROCEDURE — 94799 UNLISTED PULMONARY SVC/PX: CPT

## 2022-05-13 PROCEDURE — 25010000002 HEPARIN LOCK FLUSH PER 10 UNITS: Performed by: EMERGENCY MEDICINE

## 2022-05-13 PROCEDURE — 93005 ELECTROCARDIOGRAM TRACING: CPT

## 2022-05-13 PROCEDURE — 93005 ELECTROCARDIOGRAM TRACING: CPT | Performed by: EMERGENCY MEDICINE

## 2022-05-13 PROCEDURE — 80053 COMPREHEN METABOLIC PANEL: CPT | Performed by: EMERGENCY MEDICINE

## 2022-05-13 PROCEDURE — 99284 EMERGENCY DEPT VISIT MOD MDM: CPT

## 2022-05-13 PROCEDURE — 87040 BLOOD CULTURE FOR BACTERIA: CPT | Performed by: EMERGENCY MEDICINE

## 2022-05-13 PROCEDURE — 83880 ASSAY OF NATRIURETIC PEPTIDE: CPT | Performed by: EMERGENCY MEDICINE

## 2022-05-13 PROCEDURE — 36415 COLL VENOUS BLD VENIPUNCTURE: CPT

## 2022-05-13 PROCEDURE — 84484 ASSAY OF TROPONIN QUANT: CPT | Performed by: EMERGENCY MEDICINE

## 2022-05-13 PROCEDURE — 94664 DEMO&/EVAL PT USE INHALER: CPT

## 2022-05-13 RX ORDER — HEPARIN SODIUM (PORCINE) LOCK FLUSH IV SOLN 100 UNIT/ML 100 UNIT/ML
500 SOLUTION INTRAVENOUS ONCE
Status: COMPLETED | OUTPATIENT
Start: 2022-05-13 | End: 2022-05-13

## 2022-05-13 RX ORDER — AZITHROMYCIN 250 MG/1
250 TABLET, FILM COATED ORAL ONCE
COMMUNITY
End: 2022-08-25

## 2022-05-13 RX ORDER — CEFDINIR 300 MG/1
300 CAPSULE ORAL 2 TIMES DAILY
COMMUNITY
End: 2022-07-18

## 2022-05-13 RX ORDER — IPRATROPIUM BROMIDE AND ALBUTEROL SULFATE 2.5; .5 MG/3ML; MG/3ML
3 SOLUTION RESPIRATORY (INHALATION)
Status: COMPLETED | OUTPATIENT
Start: 2022-05-13 | End: 2022-05-13

## 2022-05-13 RX ORDER — SODIUM CHLORIDE 0.9 % (FLUSH) 0.9 %
10 SYRINGE (ML) INJECTION AS NEEDED
Status: DISCONTINUED | OUTPATIENT
Start: 2022-05-13 | End: 2022-05-13 | Stop reason: HOSPADM

## 2022-05-13 RX ORDER — PREDNISONE 20 MG/1
60 TABLET ORAL DAILY
COMMUNITY
End: 2022-09-26

## 2022-05-13 RX ADMIN — IPRATROPIUM BROMIDE AND ALBUTEROL SULFATE 3 ML: .5; 3 SOLUTION RESPIRATORY (INHALATION) at 05:35

## 2022-05-13 RX ADMIN — IPRATROPIUM BROMIDE AND ALBUTEROL SULFATE 3 ML: .5; 3 SOLUTION RESPIRATORY (INHALATION) at 05:34

## 2022-05-13 RX ADMIN — IPRATROPIUM BROMIDE AND ALBUTEROL SULFATE 3 ML: .5; 3 SOLUTION RESPIRATORY (INHALATION) at 05:33

## 2022-05-13 RX ADMIN — HEPARIN SODIUM (PORCINE) LOCK FLUSH IV SOLN 100 UNIT/ML 500 UNITS: 100 SOLUTION at 06:29

## 2022-05-13 NOTE — ED PROVIDER NOTES
"Time: 4:48 AM EDT  Arrived by: Private car  Chief Complaint: Shortness of breath  History provided by: Patient and records  History is limited by: N/A     History of Present Illness:    Katharina Torres is a 62 y.o. female who presents to the emergency department today with complaints of moderate and constant shortness of breath. The patient reports that two years ago, she was diagnosed with lung cancer. She states that she still receives IV chemotherapy and was supposed to receive it Tuesday; however, she was informed that she \"looked too sick\" when she went to her appointment. Her oncologist then referred her to the ED instead.    The patient reports that she was then seen at [facility] for pneumonia and influenza A. While there were no beds available, she states that she remained in the ED for 2-3 days. She was discharged yesterday on azithromycin, prednisone, and Cefdinir, but denies being sent home with Tamiflu or oxygen. She denies starting her steroids yet.    Ever since returning home, the patient advises that she has been feeling short of breath with a cough and intermittent chills. She reports that while she did not check her oxygen saturations, she feels that they have been running low. She denies any fever or chest pain.    The patient notes chronic swelling to the left lower extremity from a Baker's cyst. She feels that her current level of swelling is baseline.    The patient denies a history of DVT/PE; however, per her records, the patient is listed as having a prior DVT. She denies taking blood thinners. The patient is a current smoker but denies current alcohol or drug use. There are no other acute complaints at this time.          Similar Symptoms Previously: Yes.  Recently seen: Patient was seen in the ED on 3/11/2022 with leg swelling.      Patient Care Team  Primary Care Provider: Michelle Peters MD  Oncologist: Dr. Friend    Past Medical History:     No Known Allergies  Past Medical History: "   Diagnosis Date   • Afib (HCC)    • Cancer (HCC)    • DVT (deep venous thrombosis) (HCC)    • Hypertension    • Pneumonia    • Thyroid disease      Past Surgical History:   Procedure Laterality Date   • CARPAL TUNNEL RELEASE     • HEEL SPUR SURGERY Left      History reviewed. No pertinent family history.    Home Medications:  Prior to Admission medications    Medication Sig Start Date End Date Taking? Authorizing Provider   albuterol sulfate  (90 Base) MCG/ACT inhaler Inhale 2 puffs Every 6 (Six) Hours As Needed.    Jus Olmedo MD   ALPRAZolam (XANAX) 0.5 MG tablet Take 1 mg by mouth 3 (Three) Times a Day As Needed for Anxiety.    Jus Olmedo MD   amLODIPine (NORVASC) 5 MG tablet Take 5 mg by mouth Daily.    Jus Olmedo MD   atorvastatin (LIPITOR) 40 MG tablet Take 40 mg by mouth Daily. 7/17/21   Jus Olmedo MD   citalopram (CeleXA) 10 MG tablet Take 10 mg by mouth Daily.    Jus Olmedo MD   dilTIAZem CD (CARDIZEM CD) 180 MG 24 hr capsule Take 180 mg by mouth Daily.    Jus Olmedo MD   docusate sodium (COLACE) 100 MG capsule Take 100 mg by mouth 2 (Two) Times a Day As Needed for Constipation.    Jus Olmedo MD   furosemide (LASIX) 40 MG tablet Take 80 mg by mouth 2 (Two) Times a Day.    Jus Olmedo MD   levoFLOXacin (LEVAQUIN) 750 MG tablet Take 750 mg by mouth Daily. ONE LEFT    Jus Olmedo MD   levothyroxine (SYNTHROID, LEVOTHROID) 150 MCG tablet Take 150 mcg by mouth Daily.    Jus Olmedo MD   lisinopril (PRINIVIL,ZESTRIL) 20 MG tablet Take 20 mg by mouth Daily. 7/17/21   Jus Olmedo MD   ondansetron (ZOFRAN) 4 MG tablet Take 4 mg by mouth Every 8 (Eight) Hours As Needed for Nausea or Vomiting.    Jus Olmedo MD   oxyCODONE (ROXICODONE) 10 MG tablet Take 10 mg by mouth Every 6 (Six) Hours As Needed for Moderate Pain .    Jus Olmedo MD   oxyCODONE-acetaminophen (PERCOCET)  " MG per tablet Take 1 tablet by mouth Every 4 (Four) Hours As Needed for Moderate Pain .    Provider, MD Jus   potassium chloride 10 MEQ CR tablet Take 10 mEq by mouth Daily.    Provider, MD Jus        Social History:   Social History     Tobacco Use   • Smoking status: Current Every Day Smoker     Packs/day: 1.50     Years: 35.00     Pack years: 52.50     Types: Cigarettes     Start date: 10/23/1986   • Smokeless tobacco: Never Used   Vaping Use   • Vaping Use: Never used   Substance Use Topics   • Alcohol use: Not Currently   • Drug use: Not Currently         Review of Systems:  Review of Systems   Constitutional: Positive for chills. Negative for fever.   HENT: Negative for nosebleeds.    Eyes: Negative for redness.   Respiratory: Positive for cough and shortness of breath.    Cardiovascular: Positive for leg swelling (chronic swelling to left lower extremity). Negative for chest pain.   Gastrointestinal: Negative for diarrhea and vomiting.   Genitourinary: Negative for dysuria and frequency.   Musculoskeletal: Negative for back pain and neck pain.   Skin: Negative for rash.   Neurological: Negative for seizures and syncope.        Physical Exam:  /70   Pulse 81   Temp 98.2 °F (36.8 °C) (Oral)   Resp 18   Ht 165.1 cm (65\")   Wt 92.9 kg (204 lb 12.9 oz)   SpO2 95%   BMI 34.08 kg/m²     Physical Exam  Vitals and nursing note reviewed.   Constitutional:       General: She is not in acute distress.  HENT:      Head: Normocephalic and atraumatic.      Nose: Nose normal.      Mouth/Throat:      Mouth: Mucous membranes are moist.   Eyes:      General: No scleral icterus.  Cardiovascular:      Rate and Rhythm: Normal rate and regular rhythm.      Pulses: Normal pulses.      Heart sounds: Murmur heard.    Systolic (ejection) murmur is present.  Pulmonary:      Effort: No tachypnea, accessory muscle usage, respiratory distress or retractions.      Breath sounds: Wheezing (diffuse) and " rhonchi (right-sided) present.   Abdominal:      Palpations: Abdomen is soft.      Tenderness: There is no abdominal tenderness.   Musculoskeletal:         General: No tenderness. Normal range of motion.      Cervical back: Normal range of motion and neck supple.      Right lower leg: No edema.      Left lower leg: No edema.   Skin:     General: Skin is warm and dry.   Neurological:      Mental Status: She is alert. Mental status is at baseline.   Psychiatric:         Behavior: Behavior normal.                Medications in the Emergency Department:  Medications   sodium chloride 0.9 % flush 10 mL (has no administration in time range)   ipratropium-albuterol (DUO-NEB) nebulizer solution 3 mL (3 mL Nebulization Given 5/13/22 0535)   heparin injection 500 Units (500 Units Intravenous Given 5/13/22 0629)        Labs  Lab Results (last 24 hours)     Procedure Component Value Units Date/Time    CBC & Differential [[id number]]  (Abnormal) Collected: 05/13/22 0512    Specimen: Blood Updated: 05/13/22 0615    Narrative:      The following orders were created for panel order CBC & Differential.  Procedure                               Abnormality         Status                     ---------                               -----------         ------                     CBC Auto Differential[[id number]]        Abnormal            Final result               Scan Slide[[id number]]                                                                    Please view results for these tests on the individual orders.    Comprehensive Metabolic Panel [[id number]]  (Abnormal) Collected: 05/13/22 0512    Specimen: Blood Updated: 05/13/22 0557     Glucose 128 mg/dL      BUN 13 mg/dL      Creatinine 0.58 mg/dL      Sodium 142 mmol/L      Potassium 4.0 mmol/L      Chloride 104 mmol/L      CO2 27.8 mmol/L      Calcium 9.0 mg/dL      Total Protein 7.0 g/dL      Albumin 3.80 g/dL      ALT (SGPT) 23 U/L      AST (SGOT) 19 U/L      Alkaline Phosphatase 79  U/L      Total Bilirubin 0.2 mg/dL      Globulin 3.2 gm/dL      A/G Ratio 1.2 g/dL      BUN/Creatinine Ratio 22.4     Anion Gap 10.2 mmol/L      eGFR 103.7 mL/min/1.73      Comment: National Kidney Foundation and American Society of Nephrology (ASN) Task Force recommended calculation based on the Chronic Kidney Disease Epidemiology Collaboration (CKD-EPI) equation refit without adjustment for race.       Narrative:      GFR Normal >60  Chronic Kidney Disease <60  Kidney Failure <15      BNP [[id number]]  (Normal) Collected: 05/13/22 0512    Specimen: Blood Updated: 05/13/22 0552     proBNP 190.7 pg/mL     Narrative:      Among patients with dyspnea, NT-proBNP is highly sensitive for the detection of acute congestive heart failure. In addition NT-proBNP of <300 pg/ml effectively rules out acute congestive heart failure with 99% negative predictive value.    Results may be falsely decreased if patient taking Biotin.      Troponin [[id number]]  (Normal) Collected: 05/13/22 0512    Specimen: Blood Updated: 05/13/22 0557     Troponin T <0.010 ng/mL     Narrative:      Troponin T Reference Range:  <= 0.03 ng/mL-   Negative for AMI  >0.03 ng/mL-     Abnormal for myocardial necrosis.  Clinicians would have to utilize clinical acumen, EKG, Troponin and serial changes to determine if it is an Acute Myocardial Infarction or myocardial injury due to an underlying chronic condition.       Results may be falsely decreased if patient taking Biotin.      Blood Culture - Blood, Chest, Right [[id number]] Collected: 05/13/22 0512    Specimen: Blood from Chest, Right Updated: 05/13/22 0528    Lactic Acid, Plasma [[id number]]  (Normal) Collected: 05/13/22 0512    Specimen: Blood Updated: 05/13/22 0547     Lactate 1.1 mmol/L     CBC Auto Differential [[id number]]  (Abnormal) Collected: 05/13/22 0512    Specimen: Blood Updated: 05/13/22 0615     WBC 10.42 10*3/mm3      RBC 4.01 10*6/mm3      Hemoglobin 11.5 g/dL      Hematocrit 35.9 %      MCV  89.5 fL      MCH 28.7 pg      MCHC 32.0 g/dL      RDW 14.0 %      RDW-SD 45.4 fl      MPV 9.5 fL      Platelets 234 10*3/mm3     Manual Differential [[id number]]  (Abnormal) Collected: 05/13/22 0512    Specimen: Blood Updated: 05/13/22 0615     Neutrophil % 76.0 %      Lymphocyte % 11.0 %      Monocyte % 5.0 %      Bands %  4.0 %      Promyelocyte % 1.0 %      Atypical Lymphocyte % 1.0 %      Blasts % 2.0 %      Neutrophils Absolute 8.34 10*3/mm3      Lymphocytes Absolute 1.25 10*3/mm3      Monocytes Absolute 0.52 10*3/mm3      RBC Morphology Normal     WBC Morphology Normal     Platelet Estimate Adequate           Imaging:  XR Chest 1 View    Result Date: 5/13/2022  PROCEDURE: XR CHEST 1 VW  COMPARISON: [facility], CR, XR CHEST 1 VW, 3/11/2022, 20:18.  INDICATIONS: SHORTNESS OF BREATH.  FINDINGS:  A single AP upright portable chest radiograph was performed.  Borderline cardiac enlargement is seen.  Chronic interstitial pulmonary opacities are seen, which may be related to chronic interstitial lung disease.  Mild pulmonary edema cannot be excluded.  Pneumonia is thought to be less likely.  No pleural effusion or pneumothorax is identified.  There is a right-sided central venous line in place with its tip not well seen but thought to be in the lower superior vena cava (SVC).  External artifacts obscure detail.  The thoracic aorta is atherosclerotic.  Chronic calcified granulomatous disease involves the chest.  No significant interval change is seen since the prior study.        No focal lobar infiltrate is suggested.  Mild pulmonary edema is possible.     COMMENT:  Part of this note is an electronic transcription of spoken language to printed text. The electronic translation/transcription may permit erroneous, or at times, nonsensical (or even sensical) words or phrases to be inadvertently transcribed or omitted; this  has reviewed the note for such errors (as well as additional errors);  however, some may still exist.  KIERRA YANES JR, MD       Electronically Signed and Approved By: KIERRA YANES JR, MD on 5/13/2022 at 5:12                Procedures:  Procedures    Progress  ED Course as of 05/13/22 0631   Fri May 13, 2022   0503 EKG:    Rhythm: Sinus rhythm  Rate: 78  Intervals: Normal ND QT interval  T-wave: Nonspecific T wave flattening  ST Segment: No pathological ST ovation ST depression    EKG Comparison: The QRS and ST morphology are unchanged from EKG performed March 11, 2022    Interpreted by me   [SD]      ED Course User Index  [SD] Jeronimo Marcial DO                            Medical Decision Making:  MDM  Number of Diagnoses or Management Options  Acute bronchitis, unspecified organism  Chronic obstructive pulmonary disease, unspecified COPD type (HCC)  Malignant neoplasm of lung, unspecified laterality, unspecified part of lung (HCC)  Pneumonia of left lower lobe due to infectious organism  Diagnosis management comments:       I reviewed the patient's CAT scan performed at [place] on May 10.  CT scan was negative for pulmonary embolism and it was protocol for pulmonary embolism.  The patient's CT did demonstrate a left lower lobe pneumonia    The patient's CBC here demonstrated a normal white blood cell count, the patient's hemoglobin was slightly low at 11.5.  The patient's troponin was normal.  The patient's BNP was normal.  The patient's lactate was normal.  The patient's chemistry essentially was normal with the exception of glucose being slightly elevated at 128.  The patient's chest x-ray demonstrated no focal lobar infiltrate.  The patient's chest x-ray demonstrated chronic interstitial pulmonary opacities most likely to represent chronic interstitial lung disease although pulmonary edema could not be ruled out but thought less likely with a normal BNP.  I reviewed the patient's chest CT that was performed May 10 at [place].  The CT scan was negative for pulmonary  embolism and demonstrated left lower lobe pneumonia.  The patient had already been receiving steroids.  The patient was given 3 breathing treatments in the emergency department and reevaluated.  On reexamination, the patient was in no respiratory distress including no tachypnea, no accessory muscle use or intercostal retractions.  The patient had a normal room air pulse ox.  We then ambulated the patient throughout the emergency department.  The patient's pulse ox was 98% and she tolerated it well.  The patient states that she feels well and feels comfortable to go home.  The patient will continue her 50 mg of prednisone daily, the cefdinir and Zithromax.  Patient will use her albuterol nebulizer every 4-6 hours as needed.  The patient will follow-up with her primary care physician on Monday.  The patient was given very specific instructions on when and why to return to the emergency room.  The patient voiced understanding and felt comfortable for discharge.  The patient appears appropriate for discharge and outpatient follow-up.         Amount and/or Complexity of Data Reviewed  Clinical lab tests: reviewed  Tests in the radiology section of CPT®: reviewed  Tests in the medicine section of CPT®: reviewed  Review and summarize past medical records: yes (CT chest PE Protocol w  Order: [id number]    Narrative    INDICATION:  Shortness of air for two days.  Additional History: lung cancer.       TECHNIQUE:  CTA of the chest was performed with intravenous contrast.  3D   angiographic image post processing was generated.  Isovue 370--80 mL was   administered intravenously.     Automated mA/kV exposure control was utilized and patient examination was performed   in strict accordance with principles of ALARA.     RADIATION AMOUNT:  653 mGy-cm.     COMPARISON:  XR chest 5/10/2022.  CT chest 1/19/2022, 4/21/2021.     FINDINGS:   No mediastinal or hilar lymphadenopathy or masses. Several unenlarged mediastinal   and hilar  lymph nodes. Nonaneurysmal thoracic aorta with several calcified plaques.   No pulmonary emboli. No pericardial fluid or thickening. Central airways clear.     Minor right apical scarring. Airspace disease and consolidation left lower lobe and   lingula. Lungs and pleural spaces otherwise clear.     No chest wall mass or adenopathy. Right internal jugular vein Mediport catheter tip   near the cavoatrial junction. Stable 3 cm probably calcified right adrenal nodule.   Normal visualized portions of the liver. No fractures, destructive or sclerotic   skeletal lesions.     IMPRESSION:   1. No pulmonary emboli   2. Left lower lobe and lingular pneumonia       Signed by Brennon Dumont MD   ##### Final #####     Dictated by:    BRENNON MARTINEZ MD-RAD   Dictated DT/TM: 05/10/2022 5:21 pm   )                 Final diagnoses:   Chronic obstructive pulmonary disease, unspecified COPD type (HCC)   Acute bronchitis, unspecified organism   Pneumonia of left lower lobe due to infectious organism   Malignant neoplasm of lung, unspecified laterality, unspecified part of lung (HCC)        Disposition:  ED Disposition     ED Disposition   Discharge    Condition   Stable    Comment   --             Part of this note may be an electronic transcription/translation of spoken language to printed text using the Dragon Dictation System.     Documentation assistance provided by Mini Gaspar acting as scribe for Jeronimo Marcial DO. Information recorded by the scribe was done at my direction and has been verified and validated by me.        Mini Gaspar  05/13/22 0459       Mini Gaspar  05/13/22 0459       Mini Gaspar  05/13/22 0537       Mini Gaspar  05/13/22 0537       Jeronimo Marcial DO  05/13/22 4637

## 2022-05-13 NOTE — ED TRIAGE NOTES
Pt to ED 32 via w/c with c/o persistent sob after being dx w/ influenza A and pneumonia. Pt reports was just dc from [place] after a 3d stay and had initially been feeling better.  Pt reports was prescribed home o2 prn but was not instructed properly how to use it. Pt reports was given inhaler that she used once and was also prescribed neb tx but did not have the actual nebulizer to give herself the treatments.  Pt states was progressively feeling more sob while at rest at home.  Pt reports was coughing up colored phlegm.  Pt reports being prescribed oral atb and took first dose of it last pm.      Pt reports currently being tx for lung cancer by Dr. Pierre in [place].

## 2022-05-13 NOTE — ED TRIAGE NOTES
Pt to ED from home with reports of shortness of breath.      Pt has lung cancer and does chemo and radiation every three weeks.    Pt states she was discharged yesterday from [facility] after three day admission with diagnosis of pneumonia and influenza.

## 2022-05-13 NOTE — DISCHARGE INSTRUCTIONS
Please use your albuterol nebulizer at home every 4-6 hours as needed for shortness of breath and wheezing    Please stop smoking    Please continue the prednisone, cefdinir and Zithromax as prescribed by [facility]    Return to the emergency immediately for increasing shortness of breath, chest pain, chest pressure, near passing out, passing out, unusual fatigue, unusual sweating, intractable vomiting, uncontrolled fever or any new symptoms you are concerned with

## 2022-05-18 LAB
BACTERIA SPEC AEROBE CULT: NORMAL
BACTERIA SPEC AEROBE CULT: NORMAL

## 2022-05-21 LAB — QT INTERVAL: 400 MS

## 2022-07-17 PROCEDURE — 93005 ELECTROCARDIOGRAM TRACING: CPT | Performed by: EMERGENCY MEDICINE

## 2022-07-17 PROCEDURE — 99284 EMERGENCY DEPT VISIT MOD MDM: CPT

## 2022-07-17 PROCEDURE — 96365 THER/PROPH/DIAG IV INF INIT: CPT

## 2022-07-17 PROCEDURE — 96367 TX/PROPH/DG ADDL SEQ IV INF: CPT

## 2022-07-17 RX ORDER — ASPIRIN 81 MG/1
324 TABLET, CHEWABLE ORAL ONCE
Status: DISCONTINUED | OUTPATIENT
Start: 2022-07-18 | End: 2022-07-18 | Stop reason: HOSPADM

## 2022-07-17 RX ORDER — SODIUM CHLORIDE 0.9 % (FLUSH) 0.9 %
10 SYRINGE (ML) INJECTION AS NEEDED
Status: DISCONTINUED | OUTPATIENT
Start: 2022-07-17 | End: 2022-07-18 | Stop reason: HOSPADM

## 2022-07-18 ENCOUNTER — HOSPITAL ENCOUNTER (EMERGENCY)
Facility: HOSPITAL | Age: 62
Discharge: HOME | End: 2022-07-18
Attending: EMERGENCY MEDICINE | Admitting: EMERGENCY MEDICINE

## 2022-07-18 ENCOUNTER — APPOINTMENT (OUTPATIENT)
Dept: GENERAL RADIOLOGY | Facility: HOSPITAL | Age: 62
End: 2022-07-18

## 2022-07-18 ENCOUNTER — APPOINTMENT (OUTPATIENT)
Dept: CT IMAGING | Facility: HOSPITAL | Age: 62
End: 2022-07-18

## 2022-07-18 VITALS
RESPIRATION RATE: 23 BRPM | DIASTOLIC BLOOD PRESSURE: 77 MMHG | SYSTOLIC BLOOD PRESSURE: 104 MMHG | TEMPERATURE: 98.1 F | HEART RATE: 85 BPM | OXYGEN SATURATION: 96 % | WEIGHT: 200.4 LBS | BODY MASS INDEX: 34.21 KG/M2 | HEIGHT: 64 IN

## 2022-07-18 DIAGNOSIS — C34.91 MALIGNANT NEOPLASM OF RIGHT LUNG, UNSPECIFIED PART OF LUNG: ICD-10-CM

## 2022-07-18 DIAGNOSIS — J44.1 COPD EXACERBATION: Primary | ICD-10-CM

## 2022-07-18 LAB
ALBUMIN SERPL-MCNC: 4.8 G/DL (ref 3.5–5.2)
ALBUMIN/GLOB SERPL: 1.8 G/DL
ALP SERPL-CCNC: 95 U/L (ref 39–117)
ALT SERPL W P-5'-P-CCNC: 14 U/L (ref 1–33)
ANION GAP SERPL CALCULATED.3IONS-SCNC: 10.1 MMOL/L (ref 5–15)
ARTERIAL PATENCY WRIST A: POSITIVE
AST SERPL-CCNC: 17 U/L (ref 1–32)
BASE EXCESS BLDA CALC-SCNC: 1.2 MMOL/L (ref -2–2)
BASOPHILS # BLD AUTO: 0.05 10*3/MM3 (ref 0–0.2)
BASOPHILS NFR BLD AUTO: 0.5 % (ref 0–1.5)
BDY SITE: ABNORMAL
BILIRUB SERPL-MCNC: 0.3 MG/DL (ref 0–1.2)
BUN SERPL-MCNC: 21 MG/DL (ref 8–23)
BUN/CREAT SERPL: 22.8 (ref 7–25)
CA-I BLDA-SCNC: 1.19 MMOL/L (ref 1.13–1.32)
CALCIUM SPEC-SCNC: 9.7 MG/DL (ref 8.6–10.5)
CHLORIDE BLDA-SCNC: 102 MMOL/L (ref 98–106)
CHLORIDE SERPL-SCNC: 103 MMOL/L (ref 98–107)
CK MB SERPL-CCNC: 5.09 NG/ML
CK SERPL-CCNC: 138 U/L (ref 20–180)
CO2 SERPL-SCNC: 26.9 MMOL/L (ref 22–29)
COHGB MFR BLD: 3.7 % (ref 0–1.5)
CREAT SERPL-MCNC: 0.92 MG/DL (ref 0.57–1)
D-LACTATE SERPL-SCNC: 1 MMOL/L (ref 0.5–2)
DEPRECATED RDW RBC AUTO: 44.8 FL (ref 37–54)
EGFRCR SERPLBLD CKD-EPI 2021: 71.4 ML/MIN/1.73
EOSINOPHIL # BLD AUTO: 0.34 10*3/MM3 (ref 0–0.4)
EOSINOPHIL NFR BLD AUTO: 3.7 % (ref 0.3–6.2)
ERYTHROCYTE [DISTWIDTH] IN BLOOD BY AUTOMATED COUNT: 13.7 % (ref 12.3–15.4)
FHHB: 43.4 % (ref 0–5)
FLUAV AG NPH QL: NEGATIVE
FLUBV AG NPH QL IA: NEGATIVE
GAS FLOW AIRWAY: 2 LPM
GLOBULIN UR ELPH-MCNC: 2.7 GM/DL
GLUCOSE BLDA-MCNC: 110 MMOL/L (ref 65–99)
GLUCOSE SERPL-MCNC: 111 MG/DL (ref 65–99)
HCO3 BLDA-SCNC: 27.2 MMOL/L (ref 22–26)
HCT VFR BLD AUTO: 38.3 % (ref 34–46.6)
HGB BLD-MCNC: 13.1 G/DL (ref 12–15.9)
HGB BLDA-MCNC: 13.9 G/DL (ref 11.7–14.6)
HOLD SPECIMEN: 11
HOLD SPECIMEN: 11
HOLD SPECIMEN: NORMAL
IMM GRANULOCYTES # BLD AUTO: 0.03 10*3/MM3 (ref 0–0.05)
IMM GRANULOCYTES NFR BLD AUTO: 0.3 % (ref 0–0.5)
INHALED O2 CONCENTRATION: 28 %
LACTATE BLDA-SCNC: 1.29 MMOL/L (ref 0.5–2)
LIPASE SERPL-CCNC: 30 U/L (ref 13–60)
LYMPHOCYTES # BLD AUTO: 2.05 10*3/MM3 (ref 0.7–3.1)
LYMPHOCYTES NFR BLD AUTO: 22.3 % (ref 19.6–45.3)
MAGNESIUM SERPL-MCNC: 2.1 MG/DL (ref 1.6–2.4)
MCH RBC QN AUTO: 29.9 PG (ref 26.6–33)
MCHC RBC AUTO-ENTMCNC: 34.2 G/DL (ref 31.5–35.7)
MCV RBC AUTO: 87.4 FL (ref 79–97)
METHGB BLD QL: 0.3 % (ref 0–1.5)
MODALITY: ABNORMAL
MONOCYTES # BLD AUTO: 0.84 10*3/MM3 (ref 0.1–0.9)
MONOCYTES NFR BLD AUTO: 9.1 % (ref 5–12)
NEUTROPHILS NFR BLD AUTO: 5.9 10*3/MM3 (ref 1.7–7)
NEUTROPHILS NFR BLD AUTO: 64.1 % (ref 42.7–76)
NOTE: ABNORMAL
NRBC BLD AUTO-RTO: 0 /100 WBC (ref 0–0.2)
NT-PROBNP SERPL-MCNC: 65.5 PG/ML (ref 0–900)
OXYHGB MFR BLDV: 52.6 % (ref 94–99)
PCO2 BLDA: 48.6 MM HG (ref 35–45)
PH BLDA: 7.37 PH UNITS (ref 7.35–7.45)
PLATELET # BLD AUTO: 235 10*3/MM3 (ref 140–450)
PMV BLD AUTO: 9.5 FL (ref 6–12)
PO2 BLD: <145 MM[HG] (ref 0–500)
PO2 BLDA: <40.5 MM HG (ref 80–100)
POTASSIUM BLDA-SCNC: 4.17 MMOL/L (ref 3.5–5)
POTASSIUM SERPL-SCNC: 4.2 MMOL/L (ref 3.5–5.2)
PROT SERPL-MCNC: 7.5 G/DL (ref 6–8.5)
QT INTERVAL: 384 MS
QT INTERVAL: 410 MS
RBC # BLD AUTO: 4.38 10*6/MM3 (ref 3.77–5.28)
SAO2 % BLDCOA: 54.8 % (ref 95–99)
SARS-COV-2 RNA PNL SPEC NAA+PROBE: NOT DETECTED
SODIUM BLDA-SCNC: 139.6 MMOL/L (ref 136–146)
SODIUM SERPL-SCNC: 140 MMOL/L (ref 136–145)
TROPONIN I SERPL-MCNC: 0.01 NG/ML (ref 0–0.6)
TROPONIN I SERPL-MCNC: 0.02 NG/ML (ref 0–0.6)
WBC NRBC COR # BLD: 9.21 10*3/MM3 (ref 3.4–10.8)
WHOLE BLOOD HOLD COAG: 11
WHOLE BLOOD HOLD SPECIMEN: 11

## 2022-07-18 PROCEDURE — 83050 HGB METHEMOGLOBIN QUAN: CPT | Performed by: EMERGENCY MEDICINE

## 2022-07-18 PROCEDURE — 87804 INFLUENZA ASSAY W/OPTIC: CPT | Performed by: EMERGENCY MEDICINE

## 2022-07-18 PROCEDURE — 83690 ASSAY OF LIPASE: CPT | Performed by: EMERGENCY MEDICINE

## 2022-07-18 PROCEDURE — 94640 AIRWAY INHALATION TREATMENT: CPT

## 2022-07-18 PROCEDURE — C9803 HOPD COVID-19 SPEC COLLECT: HCPCS | Performed by: EMERGENCY MEDICINE

## 2022-07-18 PROCEDURE — 96367 TX/PROPH/DG ADDL SEQ IV INF: CPT

## 2022-07-18 PROCEDURE — 84484 ASSAY OF TROPONIN QUANT: CPT

## 2022-07-18 PROCEDURE — 25010000002 CEFTRIAXONE PER 250 MG: Performed by: EMERGENCY MEDICINE

## 2022-07-18 PROCEDURE — 87040 BLOOD CULTURE FOR BACTERIA: CPT | Performed by: EMERGENCY MEDICINE

## 2022-07-18 PROCEDURE — 80053 COMPREHEN METABOLIC PANEL: CPT | Performed by: EMERGENCY MEDICINE

## 2022-07-18 PROCEDURE — 82375 ASSAY CARBOXYHB QUANT: CPT | Performed by: EMERGENCY MEDICINE

## 2022-07-18 PROCEDURE — 94664 DEMO&/EVAL PT USE INHALER: CPT

## 2022-07-18 PROCEDURE — 82550 ASSAY OF CK (CPK): CPT | Performed by: EMERGENCY MEDICINE

## 2022-07-18 PROCEDURE — 85025 COMPLETE CBC W/AUTO DIFF WBC: CPT | Performed by: EMERGENCY MEDICINE

## 2022-07-18 PROCEDURE — 94799 UNLISTED PULMONARY SVC/PX: CPT

## 2022-07-18 PROCEDURE — U0004 COV-19 TEST NON-CDC HGH THRU: HCPCS | Performed by: EMERGENCY MEDICINE

## 2022-07-18 PROCEDURE — 25010000002 AZITHROMYCIN PER 500 MG: Performed by: EMERGENCY MEDICINE

## 2022-07-18 PROCEDURE — 94761 N-INVAS EAR/PLS OXIMETRY MLT: CPT

## 2022-07-18 PROCEDURE — 82553 CREATINE MB FRACTION: CPT | Performed by: EMERGENCY MEDICINE

## 2022-07-18 PROCEDURE — 83605 ASSAY OF LACTIC ACID: CPT | Performed by: EMERGENCY MEDICINE

## 2022-07-18 PROCEDURE — 36415 COLL VENOUS BLD VENIPUNCTURE: CPT | Performed by: EMERGENCY MEDICINE

## 2022-07-18 PROCEDURE — 93005 ELECTROCARDIOGRAM TRACING: CPT | Performed by: EMERGENCY MEDICINE

## 2022-07-18 PROCEDURE — 71045 X-RAY EXAM CHEST 1 VIEW: CPT

## 2022-07-18 PROCEDURE — 83880 ASSAY OF NATRIURETIC PEPTIDE: CPT | Performed by: EMERGENCY MEDICINE

## 2022-07-18 PROCEDURE — 96365 THER/PROPH/DIAG IV INF INIT: CPT

## 2022-07-18 PROCEDURE — 82805 BLOOD GASES W/O2 SATURATION: CPT | Performed by: EMERGENCY MEDICINE

## 2022-07-18 PROCEDURE — 71260 CT THORAX DX C+: CPT

## 2022-07-18 PROCEDURE — 83735 ASSAY OF MAGNESIUM: CPT | Performed by: EMERGENCY MEDICINE

## 2022-07-18 PROCEDURE — 36600 WITHDRAWAL OF ARTERIAL BLOOD: CPT | Performed by: EMERGENCY MEDICINE

## 2022-07-18 PROCEDURE — 0 IOPAMIDOL PER 1 ML: Performed by: EMERGENCY MEDICINE

## 2022-07-18 RX ORDER — IPRATROPIUM BROMIDE AND ALBUTEROL SULFATE 2.5; .5 MG/3ML; MG/3ML
3 SOLUTION RESPIRATORY (INHALATION) ONCE
Status: COMPLETED | OUTPATIENT
Start: 2022-07-18 | End: 2022-07-18

## 2022-07-18 RX ORDER — PREDNISONE 20 MG/1
20 TABLET ORAL
Qty: 15 TABLET | Refills: 0 | Status: SHIPPED | OUTPATIENT
Start: 2022-07-18 | End: 2022-07-23

## 2022-07-18 RX ORDER — BUPRENORPHINE HYDROCHLORIDE AND NALOXONE HYDROCHLORIDE DIHYDRATE 8; 2 MG/1; MG/1
1 TABLET SUBLINGUAL DAILY
COMMUNITY

## 2022-07-18 RX ORDER — CEFTRIAXONE SODIUM 1 G/50ML
1 INJECTION, SOLUTION INTRAVENOUS ONCE
Status: COMPLETED | OUTPATIENT
Start: 2022-07-18 | End: 2022-07-18

## 2022-07-18 RX ORDER — HYDROXYZINE HYDROCHLORIDE 25 MG/1
25 TABLET, FILM COATED ORAL 3 TIMES DAILY PRN
COMMUNITY

## 2022-07-18 RX ORDER — AZITHROMYCIN 250 MG/1
TABLET, FILM COATED ORAL
Qty: 6 TABLET | Refills: 0 | Status: SHIPPED | OUTPATIENT
Start: 2022-07-18 | End: 2022-08-25

## 2022-07-18 RX ORDER — TRAZODONE HYDROCHLORIDE 150 MG/1
150 TABLET ORAL NIGHTLY
COMMUNITY

## 2022-07-18 RX ORDER — ALBUTEROL SULFATE 1.25 MG/3ML
1 SOLUTION RESPIRATORY (INHALATION) EVERY 6 HOURS PRN
Qty: 360 ML | Refills: 0 | Status: SHIPPED | OUTPATIENT
Start: 2022-07-18

## 2022-07-18 RX ADMIN — IPRATROPIUM BROMIDE AND ALBUTEROL SULFATE 3 ML: .5; 3 SOLUTION RESPIRATORY (INHALATION) at 07:34

## 2022-07-18 RX ADMIN — CEFTRIAXONE SODIUM 1 G: 1 INJECTION, SOLUTION INTRAVENOUS at 08:04

## 2022-07-18 RX ADMIN — SODIUM CHLORIDE 500 ML: 9 INJECTION, SOLUTION INTRAVENOUS at 03:07

## 2022-07-18 RX ADMIN — IPRATROPIUM BROMIDE AND ALBUTEROL SULFATE 3 ML: .5; 3 SOLUTION RESPIRATORY (INHALATION) at 02:57

## 2022-07-18 RX ADMIN — IPRATROPIUM BROMIDE AND ALBUTEROL SULFATE 3 ML: .5; 3 SOLUTION RESPIRATORY (INHALATION) at 06:06

## 2022-07-18 RX ADMIN — AZITHROMYCIN 500 MG: 500 INJECTION, POWDER, LYOPHILIZED, FOR SOLUTION INTRAVENOUS at 08:46

## 2022-07-18 RX ADMIN — IOPAMIDOL 100 ML: 755 INJECTION, SOLUTION INTRAVENOUS at 07:00

## 2022-07-18 NOTE — ED NOTES
Patient complains of feeling tightness and weight on her chest that began this evening.  She does state she took a double dose of her blood pressure medication this evening by accident, but also did not take her lasix.  She states she has been having symptoms of a cold or allergies recently so had taken a tylenol cold and allergy this evening.  She states her ears and head have been hurting the past few days, and today, her chest began feeling tight.

## 2022-07-18 NOTE — ED PROVIDER NOTES
Time: 2:37 AM EDT  Arrived by: private car  Chief Complaint: Chest tightness  History provided by: Patient and Daughter  History is limited by: N/A     History of Present Illness:    Patient is a 62 y.o. year old female who presents to the emergency department with chest pain,shortness of breath and nausea. Pt reports chest tightness and pressure onset this evening. Pt also admits cold/allergy like symptoms (including a cough) so she took Tylenol this evening. Pt also reports headache onset a couple days ago. Pt denies vomiting and diarrhea. She denies alleviating or aggravating factors. She is on Keytruda for lung cancer  Pt has not taken her lasix today, but she normal takes 80 mg a day. Pt also believes she may have taken her all of her medications twice today (if she did it was around 6 pm). Pt does not do at-home oxygen treatments. Pt is a current everyday smoker.       History provided by:  Patient   used: No        Similar Symptoms Previously: N/A  Recently seen: N/A      Patient Care Team  Primary Care Provider: Provider, Violette Known    Past Medical History:     No Known Allergies  Past Medical History:   Diagnosis Date   • Afib (HCC)    • Cancer (HCC)    • DVT (deep venous thrombosis) (HCC)    • Hypertension    • Pneumonia    • Thyroid disease      Past Surgical History:   Procedure Laterality Date   • CARPAL TUNNEL RELEASE     • HEEL SPUR SURGERY Left      History reviewed. No pertinent family history.    Home Medications:  Prior to Admission medications    Medication Sig Start Date End Date Taking? Authorizing Provider   albuterol sulfate  (90 Base) MCG/ACT inhaler Inhale 2 puffs Every 6 (Six) Hours As Needed.    Jus Olmedo MD   ALPRAZolam (XANAX) 0.5 MG tablet Take 1 mg by mouth 3 (Three) Times a Day As Needed for Anxiety.    Jus Olmedo MD   amLODIPine (NORVASC) 5 MG tablet Take 5 mg by mouth Daily.    Jus Olmedo MD   atorvastatin (LIPITOR) 40 MG  tablet Take 40 mg by mouth Daily. 7/17/21   Jus Olmedo MD   azithromycin (ZITHROMAX) 250 MG tablet Take 250 mg by mouth 1 (One) Time.    Jus Olmedo MD   buprenorphine-naloxone (SUBOXONE) 8-2 MG per SL tablet Place 1 tablet under the tongue Daily.    Jus Olmedo MD   cefdinir (OMNICEF) 300 MG capsule Take 300 mg by mouth 2 (Two) Times a Day.    Jus Olmedo MD   citalopram (CeleXA) 10 MG tablet Take 10 mg by mouth Daily.    Jus Olmedo MD   dilTIAZem CD (CARDIZEM CD) 180 MG 24 hr capsule Take 180 mg by mouth Daily.    Jus Olmedo MD   docusate sodium (COLACE) 100 MG capsule Take 100 mg by mouth 2 (Two) Times a Day As Needed for Constipation.    Jus Olmedo MD   furosemide (LASIX) 40 MG tablet Take 80 mg by mouth 2 (Two) Times a Day.    Jus Olmedo MD   hydrOXYzine (ATARAX) 25 MG tablet Take 25 mg by mouth 3 (Three) Times a Day As Needed for Itching.    Jus Olmedo MD   levoFLOXacin (LEVAQUIN) 750 MG tablet Take 750 mg by mouth Daily. ONE LEFT    Jus Olmedo MD   levothyroxine (SYNTHROID, LEVOTHROID) 150 MCG tablet Take 150 mcg by mouth Daily.    Jus Olmedo MD   lisinopril (PRINIVIL,ZESTRIL) 20 MG tablet Take 20 mg by mouth Daily. 7/17/21   Jus Olmedo MD   metFORMIN (GLUCOPHAGE) 500 MG tablet Take 500 mg by mouth 2 (Two) Times a Day With Meals.    Jus Olmedo MD   ondansetron (ZOFRAN) 4 MG tablet Take 4 mg by mouth Every 8 (Eight) Hours As Needed for Nausea or Vomiting.    Jus Olmedo MD   oxyCODONE (ROXICODONE) 10 MG tablet Take 10 mg by mouth Every 6 (Six) Hours As Needed for Moderate Pain .    Jus Olmedo MD   oxyCODONE-acetaminophen (PERCOCET)  MG per tablet Take 1 tablet by mouth Every 4 (Four) Hours As Needed for Moderate Pain .    Jus Olmedo MD   potassium chloride 10 MEQ CR tablet Take 10 mEq by mouth Daily.    Jus Olmedo MD  "  predniSONE (DELTASONE) 20 MG tablet Take 60 mg by mouth Daily. For 5 days    Provider, MD Jus   traZODone (DESYREL) 150 MG tablet Take 150 mg by mouth Every Night.    Provider, MD Jus        Social History:   Social History     Tobacco Use   • Smoking status: Current Every Day Smoker     Packs/day: 1.50     Years: 35.00     Pack years: 52.50     Types: Cigarettes     Start date: 10/23/1986   • Smokeless tobacco: Never Used   Vaping Use   • Vaping Use: Never used   Substance Use Topics   • Alcohol use: Not Currently   • Drug use: Not Currently     Recent travel: not applicable     Review of Systems:  Review of Systems   Constitutional: Negative for chills and fever.   HENT: Negative for congestion and sore throat.    Eyes: Negative for pain.   Respiratory: Positive for cough and chest tightness. Negative for shortness of breath.    Cardiovascular: Negative for chest pain.   Gastrointestinal: Positive for nausea. Negative for abdominal pain, diarrhea and vomiting.   Genitourinary: Negative for flank pain and hematuria.   Musculoskeletal: Negative for joint swelling.   Skin: Negative for pallor.   Neurological: Positive for headaches. Negative for seizures.   All other systems reviewed and are negative.       Physical Exam:  /77   Pulse 85   Temp 98.1 °F (36.7 °C) (Oral)   Resp 23   Ht 162.6 cm (64\")   Wt 90.9 kg (200 lb 6.4 oz)   SpO2 96%   BMI 34.40 kg/m²     Physical Exam  Vitals and nursing note reviewed.   Constitutional:       General: She is not in acute distress.     Appearance: Normal appearance. She is not toxic-appearing.   HENT:      Head: Normocephalic and atraumatic.      Nose: Nose normal.      Mouth/Throat:      Mouth: Mucous membranes are moist.   Eyes:      General: No scleral icterus.     Extraocular Movements: Extraocular movements intact.      Conjunctiva/sclera: Conjunctivae normal.      Pupils: Pupils are equal, round, and reactive to light.   Cardiovascular:      " Rate and Rhythm: Normal rate and regular rhythm.      Pulses: Normal pulses.      Heart sounds: Normal heart sounds.   Pulmonary:      Effort: Pulmonary effort is normal. No respiratory distress.      Breath sounds: Wheezing (BL) present.   Abdominal:      General: Abdomen is flat. There is no distension.      Palpations: Abdomen is soft.      Tenderness: There is no abdominal tenderness.   Musculoskeletal:         General: Normal range of motion.      Cervical back: Normal range of motion and neck supple.      Right lower leg: Edema present.      Left lower leg: Edema present.   Skin:     General: Skin is warm and dry.      Capillary Refill: Capillary refill takes less than 2 seconds.   Neurological:      General: No focal deficit present.      Mental Status: She is alert and oriented to person, place, and time. Mental status is at baseline.   Psychiatric:         Mood and Affect: Mood normal.         Behavior: Behavior normal.                Medications in the Emergency Department:  Medications   ipratropium-albuterol (DUO-NEB) nebulizer solution 3 mL (3 mL Nebulization Given 7/18/22 0257)   sodium chloride 0.9 % bolus 500 mL (0 mL Intravenous Stopped 7/18/22 0415)   ipratropium-albuterol (DUO-NEB) nebulizer solution 3 mL (3 mL Nebulization Given 7/18/22 0606)   ipratropium-albuterol (DUO-NEB) nebulizer solution 3 mL (3 mL Nebulization Given 7/18/22 0734)   iopamidol (ISOVUE-370) 76 % injection 100 mL (100 mL Intravenous Given 7/18/22 0700)   cefTRIAXone (ROCEPHIN) IVPB 1 g (0 g Intravenous Stopped 7/18/22 0834)   AZITHROMYCIN 500 MG/250 ML 0.9% NS IVPB (vial-mate) (0 mg Intravenous Stopped 7/18/22 0946)        Labs  Lab Results (last 24 hours)     Procedure Component Value Units Date/Time    POC Troponin I with Hold Tube [[id number]] Collected: 07/18/22 0022    Specimen: Blood Updated: 07/18/22 0344    Narrative:      The following orders were created for panel order POC Troponin I with Hold Tube.  Procedure                                Abnormality         Status                     ---------                               -----------         ------                     POC Troponin I[[id number]]                                                              HOLD Troponin-I Tube[[id number]]                             Final result                 Please view results for these tests on the individual orders.    CBC & Differential [[id number]]  (Normal) Collected: 07/18/22 0022    Specimen: Blood Updated: 07/18/22 0027    Narrative:      The following orders were created for panel order CBC & Differential.  Procedure                               Abnormality         Status                     ---------                               -----------         ------                     CBC Auto Differential[[id number]]        Normal              Final result                 Please view results for these tests on the individual orders.    Comprehensive Metabolic Panel [[id number]]  (Abnormal) Collected: 07/18/22 0022    Specimen: Blood Updated: 07/18/22 0047     Glucose 111 mg/dL      BUN 21 mg/dL      Creatinine 0.92 mg/dL      Sodium 140 mmol/L      Potassium 4.2 mmol/L      Chloride 103 mmol/L      CO2 26.9 mmol/L      Calcium 9.7 mg/dL      Total Protein 7.5 g/dL      Albumin 4.80 g/dL      ALT (SGPT) 14 U/L      AST (SGOT) 17 U/L      Alkaline Phosphatase 95 U/L      Total Bilirubin 0.3 mg/dL      Globulin 2.7 gm/dL      A/G Ratio 1.8 g/dL      BUN/Creatinine Ratio 22.8     Anion Gap 10.1 mmol/L      eGFR 71.4 mL/min/1.73      Comment: National Kidney Foundation and American Society of Nephrology (ASN) Task Force recommended calculation based on the Chronic Kidney Disease Epidemiology Collaboration (CKD-EPI) equation refit without adjustment for race.       Narrative:      GFR Normal >60  Chronic Kidney Disease <60  Kidney Failure <15      Lipase [[id number]]  (Normal) Collected: 07/18/22 0022    Specimen: Blood Updated: 07/18/22 0047      Lipase 30 U/L     BNP [[id number]]  (Normal) Collected: 07/18/22 0022    Specimen: Blood Updated: 07/18/22 0044     proBNP 65.5 pg/mL     Narrative:      Among patients with dyspnea, NT-proBNP is highly sensitive for the detection of acute congestive heart failure. In addition NT-proBNP of <300 pg/ml effectively rules out acute congestive heart failure with 99% negative predictive value.    Results may be falsely decreased if patient taking Biotin.      Magnesium [[id number]]  (Normal) Collected: 07/18/22 0022    Specimen: Blood Updated: 07/18/22 0047     Magnesium 2.1 mg/dL     CK Total & CKMB [[id number]]  (Normal) Collected: 07/18/22 0022    Specimen: Blood Updated: 07/18/22 0047     CKMB 5.09 ng/mL      Creatine Kinase 138 U/L     Narrative:      CKMB results may be falsely decreased if patient taking Biotin.    CBC Auto Differential [[id number]]  (Normal) Collected: 07/18/22 0022    Specimen: Blood Updated: 07/18/22 0027     WBC 9.21 10*3/mm3      RBC 4.38 10*6/mm3      Hemoglobin 13.1 g/dL      Hematocrit 38.3 %      MCV 87.4 fL      MCH 29.9 pg      MCHC 34.2 g/dL      RDW 13.7 %      RDW-SD 44.8 fl      MPV 9.5 fL      Platelets 235 10*3/mm3      Neutrophil % 64.1 %      Lymphocyte % 22.3 %      Monocyte % 9.1 %      Eosinophil % 3.7 %      Basophil % 0.5 %      Immature Grans % 0.3 %      Neutrophils, Absolute 5.90 10*3/mm3      Lymphocytes, Absolute 2.05 10*3/mm3      Monocytes, Absolute 0.84 10*3/mm3      Eosinophils, Absolute 0.34 10*3/mm3      Basophils, Absolute 0.05 10*3/mm3      Immature Grans, Absolute 0.03 10*3/mm3      nRBC 0.0 /100 WBC     POC Troponin I [[id number]]  (Normal) Collected: 07/18/22 0028    Specimen: Blood Updated: 07/18/22 0041     Troponin I 0.01 ng/mL      Comment: Serial Number: [id number]:  [id number]       POC Troponin I [[id number]]  (Normal) Collected: 07/18/22 0215    Specimen: Blood Updated: 07/18/22 0227     Troponin I 0.02 ng/mL      Comment: Serial Number: [id number]:   [id number]       Influenza Antigen, Rapid - Swab, Nasopharynx [[id number]]  (Normal) Collected: 07/18/22 0319    Specimen: Swab from Nasopharynx Updated: 07/18/22 0342     Influenza A Ag, EIA Negative     Influenza B Ag, EIA Negative    COVID-19,APTIMA PANTHER(CARIDAD),[facility], NP/OP SWAB IN UTM/VTM/[facility] TRANSPORT MEDIA,24 HR TAT - Swab, Nasal Cavity [[id number]]  (Normal) Collected: 07/18/22 0319    Specimen: Swab from Nasal Cavity Updated: 07/18/22 1239     COVID19 Not Detected    Narrative:      Fact sheet for providers: https://www.fda.gov/media/179513/download     Fact sheet for patients: https://www.fda.gov/media/263917/download    Test performed by RT PCR.    ABG with Co-Ox and Electrolytes [[id number]]  (Abnormal) Collected: 07/18/22 0750    Specimen: Arterial Blood from Arm, Right Updated: 07/18/22 0803     pH, Arterial 7.366 pH units      pCO2, Arterial 48.6 mm Hg      pO2, Arterial <40.5 mm Hg      HCO3, Arterial 27.2 mmol/L      Base Excess, Arterial 1.2 mmol/L      O2 Saturation, Arterial 54.8 %      Hemoglobin, Blood Gas 13.9 g/dL      Carboxyhemoglobin 3.7 %      Methemoglobin 0.30 %      Oxyhemoglobin 52.6 %      FHHB 43.4 %      Noe's Test Positive     Note --     Site Venous     Modality Cannula - Nasal     FIO2 28 %      Flow Rate 2 lpm      Sodium, Arterial 139.6 mmol/L      Potassium, Arterial 4.17 mmol/L      Ionized Calcium, Arterial 1.19 mmol/L      Chloride, Arterial 102 mmol/L      Glucose, Arterial 110 mmol/L      Lactate, Arterial 1.29 mmol/L      PO2/FIO2 <145    Lactic Acid, Plasma [[id number]]  (Normal) Collected: 07/18/22 0803    Specimen: Blood Updated: 07/18/22 0829     Lactate 1.0 mmol/L     Blood Culture - Blood, Arm, Left [[id number]] Collected: 07/18/22 0803    Specimen: Blood from Arm, Left Updated: 07/18/22 0809    Blood Culture - Blood, Blood, Venous Line [[id number]] Collected: 07/18/22 0803    Specimen: Blood, Venous Line Updated: 07/18/22 0808           Imaging:  CT Chest With  Contrast Diagnostic    Result Date: 7/18/2022  PROCEDURE: CT CHEST W CONTRAST DIAGNOSTIC  COMPARISON:  None INDICATIONS: shortness of breath with cough  TECHNIQUE: After obtaining the patient's consent, CT images were obtained with non-ionic intravenous contrast material.   PROTOCOL:   Pulmonary embolism imaging protocol performed    RADIATION:   DLP: 357.2mGy*cm   Automated exposure control was utilized to minimize radiation dose. CONTRAST: 100cc Isovue 370 I.V.  FINDINGS:  The pulmonary arteries are normal caliber.  No pulmonary arterial filling defects are evident to suggest PE.  The ascending aorta is ectatic measuring 3.6 cm.  Scattered coronary and other vascular calcification is present.  Mitral valvular and aortic valvular calcification is present.  There are prominent confluent right hilar lymph nodes, measuring up to 1.4 cm in short axis dimension.  This may be reactive or neoplastic.  There is a right IJ Rlivmb-V-Snrf catheter.  No supraclavicular or axillary lymphadenopathy is identified.  There is mild scarring in the lung apices.  There are patchy ground-glass opacities in the upper lungs bilaterally which may be due to an infectious or inflammatory process.  Correlate clinically.  There is a 5 mm noncalcified nodule in the left upper lobe seen best on image number 38 of the axial series.  There is a 4 mm noncalcified nodule in the posterolateral right lower lobe on image number 90 of the axial series.  Additional 2-3 mm nodule in the right upper lobe on image number 39 of the axial series old and in the right lower lobe on image number 63 of the axial series.  Additional 5 mm noncalcified nodule in the right lower lobe on image number 78 of the axial series.  There is layering material in the right main and lower lobe bronchi which may be due to mucoid secretions.  The central airways are otherwise grossly patent.  There is linear consolidation in the lung bases suggesting scarring or atelectasis.   Limited imaging through the upper abdomen demonstrates a 3.2 cm indeterminate right adrenal mass, containing calcifications.  This could be more definitively characterized by MRI.  There is multilevel degenerative change in the spine.  No aggressive osseous lesions are identified.          1. There are nonspecific patchy ground-glass opacities in the upper lungs bilaterally.  This may be due to infectious or inflammatory process, but is indeterminate.  Correlate clinically.  Short-term CT follow-up in 3-6 months suggested per Fleischner guidelines.  2. No evidence of pulmonary embolus.   3. Multiple bilateral indeterminate subcentimeter solid pulmonary nodules, measuring up to 5 mm.  Follow-up chest CT recommended in 12 months for patient with risk factors for lung cancer per Fleischner guidelines.  4. 3.2 cm right adrenal mass.  This could be more definitively characterized by MRI.      LD HILL MD       Electronically Signed and Approved By: LD HILL MD on 7/18/2022 at 7:27             XR Chest 1 View    Result Date: 7/18/2022  PROCEDURE: XR CHEST 1 VW  COMPARISON: [facility], CR, XR CHEST 1 VW, 3/11/2022, 20:18.  [facility], CR, XR CHEST 1 VW, 5/13/2022, 4:38.  INDICATIONS: CHEST PAIN  FINDINGS:  Tip of the right internal jugular central venous catheter terminates near the cavoatrial junction.  Mild interstitial opacities in the lung bases are stable.  Lungs otherwise appear clear.  No pneumothorax or large pleural effusion is seen.  Cardiomediastinal contours are stable.       No acute cardiopulmonary abnormality or significant change.       CHRISTIANO ZIEGLER MD       Electronically Signed and Approved By: CHRISTIANO ZIEGLER MD on 7/18/2022 at 1:59               Procedures:  Procedures    Progress  ED Course as of 07/18/22 1502   Mon Jul 18, 2022   0025 ECG 12 Lead  Normal sinus rhythm with rate of 82. QRS normal. MO interval normal. QTc interval is normal. No ST elevation or  depression. No T wave abnormalities. No change when compared to mame. This EKG was interpreted by me.  [LD]   0210 ECG 12 Lead  Normal sinus rhythm with rate of 74. QRS normal. AK interval normal. QTc interval is normal. No ST elevation or depression. No T wave abnormalities. No change when compared to mame. This EKG was interpreted by me.    [LD]   0947 Reevaluation: Patient resting comfortably on 2 L nasal cannula with oxygen saturations in the upper 90s.  I discussed with her her situation and she is now alerting me that she has access to home oxygen but not currently at her home.  She has an unfortunate family dynamic and feels her daughter will not bring her home oxygen from [place].  She also lives alone in a remote location and is afraid to go home without more social support.  I have taken her off oxygen on this evaluation for room air trial. [RP]   1024 O2 sats 95-96% on room air. [RP]      ED Course User Index  [LD] Maxx Hollis MD  [RP] Sandro eDjesus MD                            Medical Decision Making:  MDM  Number of Diagnoses or Management Options  COPD exacerbation (HCC)  Malignant neoplasm of right lung, unspecified part of lung (HCC)  Diagnosis management comments: Patient is afebrile nontoxic-appearing.  Vital signs are stable.  At time of evaluation she is lying in bed in no acute distress.  Patient has diffuse wheezes she was given nebulizer treatment.  On reevaluation O2 sats was dropping to the mid 80s.  With ambulation she was dropped to the upper 80s.  Patient was given additional nebulizer treatment.  Patient was placed on supplemental oxygen.  CT of the chest was obtained that showed groundglass opacities in upper lungs bilaterally.  This could be infectious versus inflammatory process.  Patient is currently on treatment for lung cancer.  Patient was signed out to oncoming physician.  Disposition pending reevaluation after nebulizer treatment.       Amount and/or  Complexity of Data Reviewed  Clinical lab tests: reviewed  Tests in the radiology section of CPT®: reviewed  Tests in the medicine section of CPT®: reviewed  Decide to obtain previous medical records or to obtain history from someone other than the patient: yes         Final diagnoses:   COPD exacerbation (HCC)   Malignant neoplasm of right lung, unspecified part of lung (HCC)        Disposition:  ED Disposition     ED Disposition   Discharge    Condition   Stable    Comment   --                Estefania Zarate  07/18/22 0248       Maxx Hollis MD  07/18/22 5643

## 2022-07-18 NOTE — DISCHARGE INSTRUCTIONS
Please get your home oxygen from your daughter's house in [place] as soon as possible.  Return to the emergency department as needed for worsening difficulty breathing.

## 2022-07-23 LAB
BACTERIA SPEC AEROBE CULT: NORMAL
BACTERIA SPEC AEROBE CULT: NORMAL

## 2022-08-25 ENCOUNTER — OFFICE VISIT (OUTPATIENT)
Dept: FAMILY MEDICINE CLINIC | Facility: CLINIC | Age: 62
End: 2022-08-25

## 2022-08-25 VITALS
SYSTOLIC BLOOD PRESSURE: 98 MMHG | BODY MASS INDEX: 35.34 KG/M2 | DIASTOLIC BLOOD PRESSURE: 68 MMHG | WEIGHT: 207 LBS | HEIGHT: 64 IN | HEART RATE: 83 BPM | OXYGEN SATURATION: 97 % | TEMPERATURE: 97.5 F

## 2022-08-25 DIAGNOSIS — E78.2 MIXED HYPERLIPIDEMIA: ICD-10-CM

## 2022-08-25 DIAGNOSIS — R73.09 ELEVATED HEMOGLOBIN A1C: ICD-10-CM

## 2022-08-25 DIAGNOSIS — G89.29 CHRONIC PAIN OF LEFT KNEE: ICD-10-CM

## 2022-08-25 DIAGNOSIS — E03.9 ACQUIRED HYPOTHYROIDISM: ICD-10-CM

## 2022-08-25 DIAGNOSIS — Z76.89 ENCOUNTER TO ESTABLISH CARE: Primary | ICD-10-CM

## 2022-08-25 DIAGNOSIS — I10 ESSENTIAL HYPERTENSION: ICD-10-CM

## 2022-08-25 DIAGNOSIS — M25.562 CHRONIC PAIN OF LEFT KNEE: ICD-10-CM

## 2022-08-25 PROCEDURE — 99204 OFFICE O/P NEW MOD 45 MIN: CPT | Performed by: NURSE PRACTITIONER

## 2022-08-25 RX ORDER — LISINOPRIL 20 MG/1
20 TABLET ORAL DAILY
Qty: 90 TABLET | Refills: 1 | Status: SHIPPED | OUTPATIENT
Start: 2022-08-25

## 2022-08-25 RX ORDER — ALBUTEROL SULFATE 90 UG/1
2 AEROSOL, METERED RESPIRATORY (INHALATION) EVERY 6 HOURS PRN
Qty: 8.5 G | Refills: 2 | Status: SHIPPED | OUTPATIENT
Start: 2022-08-25 | End: 2023-03-28

## 2022-08-25 RX ORDER — POTASSIUM CHLORIDE 750 MG/1
10 TABLET, FILM COATED, EXTENDED RELEASE ORAL DAILY
Qty: 90 TABLET | Refills: 1 | Status: SHIPPED | OUTPATIENT
Start: 2022-08-25

## 2022-08-25 RX ORDER — ATORVASTATIN CALCIUM 40 MG/1
40 TABLET, FILM COATED ORAL DAILY
Qty: 90 TABLET | Refills: 1 | Status: SHIPPED | OUTPATIENT
Start: 2022-08-25

## 2022-08-25 RX ORDER — FUROSEMIDE 40 MG/1
80 TABLET ORAL 2 TIMES DAILY
Qty: 180 TABLET | Refills: 1 | Status: SHIPPED | OUTPATIENT
Start: 2022-08-25

## 2022-08-25 RX ORDER — AMLODIPINE BESYLATE 5 MG/1
5 TABLET ORAL DAILY
Qty: 90 TABLET | Refills: 1 | Status: SHIPPED | OUTPATIENT
Start: 2022-08-25

## 2022-08-25 NOTE — PROGRESS NOTES
Chief Complaint  Establish Care    Subjective          Medical History: has a past medical history of Afib (HCC), Allergic, Anxiety, Arthritis, Bipolar 1 disorder (HCC), Cancer (HCC), COPD (chronic obstructive pulmonary disease) (HCC), Depression, DVT (deep venous thrombosis) (HCC), Hyperlipidemia, Hypertension, Pneumonia, and Thyroid disease.     Surgical History: has a past surgical history that includes Carpal tunnel release and Heel spur surgery (Left).     Family History: family history includes Cancer in her father; Diabetes in her father and mother; Heart disease in her father and mother; Stroke in her father.     Social History: reports that she has been smoking cigarettes. She started smoking about 35 years ago. She has a 52.50 pack-year smoking history. She has never used smokeless tobacco. She reports previous alcohol use. She reports previous drug use.    Katharina Torres presents to [facility]  Patient comes in to establish care. She has a hx  metastatic disease currently doing chemotherapy every 3 weeks sees oncology out of [place], [place].  Patient has had metastatic disease to the brain, but with radiation and previous chemotherapy the lesion was not apparent on last MRI of the head.  Patient states overall she is feeling well, she sees Dr. Naylor oncologist.  She remains a current smoker of 2 packs/day.    Hypertension  This is a chronic problem. The current episode started more than 1 year ago. The problem is controlled. Associated symptoms include peripheral edema. Pertinent negatives include no anxiety, blurred vision, chest pain, malaise/fatigue or shortness of breath. Risk factors for coronary artery disease include obesity and post-menopausal state. Current antihypertension treatment includes ACE inhibitors and direct vasodilators. The current treatment provides no improvement.   Hyperlipidemia  This is a chronic problem. The current episode started more than 1 year  "ago. Exacerbating diseases include diabetes, hypothyroidism and obesity. Pertinent negatives include no chest pain or shortness of breath. Current antihyperlipidemic treatment includes statins. Risk factors for coronary artery disease include dyslipidemia, hypertension, post-menopausal and obesity.   Hypothyroidism  This is a chronic problem. The current episode started more than 1 year ago. The problem has been waxing and waning. Pertinent negatives include no abdominal pain, chest pain or fatigue.   Knee Pain   The incident occurred more than 1 week ago. There was no injury mechanism. The pain is present in the left knee. The quality of the pain is described as aching and cramping. The pain is moderate. The pain has been constant since onset. She reports no foreign bodies present.       Objective   Vital Signs:   BP 98/68   Pulse 83   Temp 97.5 °F (36.4 °C)   Ht 162.6 cm (64\")   Wt 93.9 kg (207 lb)   SpO2 97%   BMI 35.53 kg/m²     Physical Exam  Vitals reviewed.   Constitutional:       Appearance: Normal appearance. She is well-developed.   HENT:      Head: Normocephalic and atraumatic.      Right Ear: External ear normal.      Left Ear: External ear normal.   Eyes:      Conjunctiva/sclera: Conjunctivae normal.      Pupils: Pupils are equal, round, and reactive to light.   Cardiovascular:      Rate and Rhythm: Normal rate and regular rhythm.      Heart sounds: No murmur heard.    No friction rub.   Pulmonary:      Effort: Pulmonary effort is normal.      Breath sounds: Normal breath sounds. No wheezing or rhonchi.   Skin:     General: Skin is warm and dry.   Neurological:      Mental Status: She is alert and oriented to person, place, and time.   Psychiatric:         Mood and Affect: Mood and affect normal.         Behavior: Behavior normal.         Thought Content: Thought content normal.         Judgment: Judgment normal.        Result Review :   The following data was reviewed by: Rajiv Bueno, " APRN on 08/25/2022:  Common labs    Common Labsle 3/11/22 3/11/22 5/13/22 5/13/22 7/18/22 7/18/22 7/18/22    1804 1804 0512 0512 0022 0022 0750   Glucose  99 128 (A)   111 (A) 110 (A)   BUN  24 (A) 13   21    Creatinine  1.05 (A) 0.58   0.92    Sodium  140 142   140 139.6   Potassium  3.8 4.0   4.2    Chloride  98 104   103    Calcium  9.9 9.0   9.7    Albumin  5.00 3.80   4.80    Total Bilirubin  0.3 0.2   0.3    Alkaline Phosphatase  77 79   95    AST (SGOT)  19 19   17    ALT (SGPT)  14 23   14    WBC 6.74   10.42 9.21     Hemoglobin 13.1   11.5 (A) 13.1     Hematocrit 40.9   35.9 38.3     Platelets 228   234 235     (A) Abnormal value            Data reviewed: Oncology notes Dr. Naylor            Current Outpatient Medications on File Prior to Visit   Medication Sig Dispense Refill   • albuterol (ACCUNEB) 1.25 MG/3ML nebulizer solution Take 3 mL by nebulization Every 6 (Six) Hours As Needed for Wheezing. 360 mL 0   • ALPRAZolam (XANAX) 0.5 MG tablet Take 1 mg by mouth 3 (Three) Times a Day As Needed for Anxiety.     • buprenorphine-naloxone (SUBOXONE) 8-2 MG per SL tablet Place 1 tablet under the tongue Daily.     • citalopram (CeleXA) 10 MG tablet Take 10 mg by mouth Daily.     • docusate sodium (COLACE) 100 MG capsule Take 100 mg by mouth 2 (Two) Times a Day As Needed for Constipation.     • hydrOXYzine (ATARAX) 25 MG tablet Take 25 mg by mouth 3 (Three) Times a Day As Needed for Itching.     • levothyroxine (SYNTHROID, LEVOTHROID) 150 MCG tablet Take 150 mcg by mouth Daily.     • ondansetron (ZOFRAN) 4 MG tablet Take 4 mg by mouth Every 8 (Eight) Hours As Needed for Nausea or Vomiting.     • traZODone (DESYREL) 150 MG tablet Take 150 mg by mouth Every Night.     • [DISCONTINUED] albuterol sulfate  (90 Base) MCG/ACT inhaler Inhale 2 puffs Every 6 (Six) Hours As Needed.     • [DISCONTINUED] amLODIPine (NORVASC) 5 MG tablet Take 5 mg by mouth Daily.     • [DISCONTINUED] atorvastatin (LIPITOR) 40 MG tablet  Take 40 mg by mouth Daily.     • [DISCONTINUED] furosemide (LASIX) 40 MG tablet Take 80 mg by mouth 2 (Two) Times a Day.     • [DISCONTINUED] lisinopril (PRINIVIL,ZESTRIL) 20 MG tablet Take 20 mg by mouth Daily.     • [DISCONTINUED] metFORMIN (GLUCOPHAGE) 500 MG tablet Take 500 mg by mouth 2 (Two) Times a Day With Meals.     • [DISCONTINUED] potassium chloride 10 MEQ CR tablet Take 10 mEq by mouth Daily.     • dilTIAZem CD (CARDIZEM CD) 180 MG 24 hr capsule Take 180 mg by mouth Daily.     • oxyCODONE (ROXICODONE) 10 MG tablet Take 10 mg by mouth Every 6 (Six) Hours As Needed for Moderate Pain .     • oxyCODONE-acetaminophen (PERCOCET)  MG per tablet Take 1 tablet by mouth Every 4 (Four) Hours As Needed for Moderate Pain .     • predniSONE (DELTASONE) 20 MG tablet Take 60 mg by mouth Daily. For 5 days     • [DISCONTINUED] azithromycin (Zithromax Z-Stevan) 250 MG tablet Take 2 tablets by mouth on day 1, then 1 tablet daily on days 2-5 6 tablet 0   • [DISCONTINUED] azithromycin (ZITHROMAX) 250 MG tablet Take 250 mg by mouth 1 (One) Time.       No current facility-administered medications on file prior to visit.        Assessment and Plan {CC Problem List  Visit Diagnosis   ROS  Review (Popup)  [facility]  Quality  BestPractice  Medications  SmartSets  SnapShot Encounters  Media :23}   Diagnoses and all orders for this visit:    1. Encounter to establish care (Primary)    2. Mixed hyperlipidemia  Comments:  Check labs, adjust medication as needed  Orders:  -     CBC & Differential; Future  -     Comprehensive Metabolic Panel; Future  -     Lipid Panel; Future  -     TSH; Future    3. Essential hypertension  Comments:  Blood pressure stable at 98/68, will continue on medication regimen  Orders:  -     CBC & Differential; Future  -     Comprehensive Metabolic Panel; Future  -     Lipid Panel; Future  -     TSH; Future    4. Chronic pain of left knee  Comments:  We will get x-ray refer over to Ortho.   Patient is agreeable treatment plan  Orders:  -     XR Knee 3 View Left; Future  -     Ambulatory Referral to Orthopedic Surgery    5. Elevated hemoglobin A1c  Comments:  We will check labs, adjust medication if needed  Orders:  -     Hemoglobin A1c; Future    6. Acquired hypothyroidism  Comments:  Check labs, adjust medication if needed    Other orders  -     lisinopril (PRINIVIL,ZESTRIL) 20 MG tablet; Take 1 tablet by mouth Daily.  Dispense: 90 tablet; Refill: 1  -     metFORMIN (GLUCOPHAGE) 500 MG tablet; Take 1 tablet by mouth 2 (Two) Times a Day With Meals.  Dispense: 180 tablet; Refill: 1  -     potassium chloride 10 MEQ CR tablet; Take 1 tablet by mouth Daily.  Dispense: 90 tablet; Refill: 1  -     furosemide (LASIX) 40 MG tablet; Take 2 tablets by mouth 2 (Two) Times a Day.  Dispense: 180 tablet; Refill: 1  -     albuterol sulfate  (90 Base) MCG/ACT inhaler; Inhale 2 puffs Every 6 (Six) Hours As Needed for Shortness of Air.  Dispense: 8.5 g; Refill: 2  -     amLODIPine (NORVASC) 5 MG tablet; Take 1 tablet by mouth Daily.  Dispense: 90 tablet; Refill: 1  -     atorvastatin (LIPITOR) 40 MG tablet; Take 1 tablet by mouth Daily.  Dispense: 90 tablet; Refill: 1        Follow Up   No follow-ups on file.  Patient was given instructions and counseling regarding her condition or for health maintenance advice. Please see specific information pulled into the AVS if appropriate.

## 2022-09-06 ENCOUNTER — TELEPHONE (OUTPATIENT)
Dept: FAMILY MEDICINE CLINIC | Facility: CLINIC | Age: 62
End: 2022-09-06

## 2022-09-06 NOTE — TELEPHONE ENCOUNTER
Caller: LUC FLOOD    Relationship: Emergency Contact    Best call back number: [phone]    What is the best time to reach you: ANY TIME     Who are you requesting to speak with (clinical staff, provider,  specific staff member): CLINICAL         What was the call regarding: CALLER STATES THAT THE PATIENT WAS CONFUSED AS TO WHEN HER APPOINTMENT FOR HER X RAY AND APPOINTMENT WITH LEG DOCTOR WAS SCHEDULED     Do you require a callback: YES         LUC FLOOD IS ON THE  VERBAL.

## 2022-09-06 NOTE — TELEPHONE ENCOUNTER
Returned patient's call, however was unable to reach patient or Emergency contact - Brooklyn Donovan. LVM asking to please return call.

## 2022-09-06 NOTE — TELEPHONE ENCOUNTER
Patient's emergency contact Brooklyn Donovan returned call. Was informed of patient's missed specialty appts and given contact information for Dr. Gilles Shaw (Orthopedist) [phone] for rescheduling. No further questions or concerns.

## 2022-09-09 ENCOUNTER — PREP FOR SURGERY (OUTPATIENT)
Dept: OTHER | Facility: HOSPITAL | Age: 62
End: 2022-09-09

## 2022-09-09 ENCOUNTER — OFFICE VISIT (OUTPATIENT)
Dept: ORTHOPEDIC SURGERY | Facility: CLINIC | Age: 62
End: 2022-09-09

## 2022-09-09 VITALS — HEIGHT: 64 IN | OXYGEN SATURATION: 98 % | WEIGHT: 207 LBS | HEART RATE: 107 BPM | BODY MASS INDEX: 35.34 KG/M2

## 2022-09-09 DIAGNOSIS — M17.12 PRIMARY OSTEOARTHRITIS OF LEFT KNEE: ICD-10-CM

## 2022-09-09 DIAGNOSIS — M17.12 PRIMARY OSTEOARTHRITIS OF LEFT KNEE: Primary | ICD-10-CM

## 2022-09-09 DIAGNOSIS — M16.12 PRIMARY OSTEOARTHRITIS OF LEFT HIP: ICD-10-CM

## 2022-09-09 DIAGNOSIS — M25.562 LEFT KNEE PAIN, UNSPECIFIED CHRONICITY: ICD-10-CM

## 2022-09-09 DIAGNOSIS — M25.552 LEFT HIP PAIN: Primary | ICD-10-CM

## 2022-09-09 PROCEDURE — 99204 OFFICE O/P NEW MOD 45 MIN: CPT | Performed by: ORTHOPAEDIC SURGERY

## 2022-09-09 RX ORDER — CEFAZOLIN SODIUM 2 G/100ML
2 INJECTION, SOLUTION INTRAVENOUS ONCE
Status: CANCELLED | OUTPATIENT
Start: 2022-09-09 | End: 2022-09-09

## 2022-09-09 RX ORDER — POVIDONE-IODINE 10 MG/ML
SOLUTION TOPICAL ONCE
Status: CANCELLED | OUTPATIENT
Start: 2022-09-09 | End: 2022-09-09

## 2022-09-09 RX ORDER — ALPRAZOLAM 1 MG/1
1 TABLET ORAL 3 TIMES DAILY
COMMUNITY
Start: 2022-08-26

## 2022-09-09 RX ORDER — TRANEXAMIC ACID 10 MG/ML
1000 INJECTION, SOLUTION INTRAVENOUS ONCE
Status: CANCELLED | OUTPATIENT
Start: 2022-09-09 | End: 2022-09-09

## 2022-09-09 NOTE — PROGRESS NOTES
"Chief Complaint  Initial Evaluation of the Left Hip and Initial Evaluation of the Left Knee      Subjective      Katharina Torres presents to [facility] for evaluation of the left hip and left knee. The patient reports left leg pain. She reports she has had a cyst to her posterior knee. She ambulates with a walker. She has been getting treatment for lung cancer. She reports her oncologist has cleared her to have surgery on her left knee.     No Known Allergies     Social History     Socioeconomic History   • Marital status:    Tobacco Use   • Smoking status: Current Every Day Smoker     Packs/day: 1.50     Years: 35.00     Pack years: 52.50     Types: Cigarettes     Start date: 10/23/1986   • Smokeless tobacco: Never Used   Vaping Use   • Vaping Use: Never used   Substance and Sexual Activity   • Alcohol use: Not Currently   • Drug use: Not Currently   • Sexual activity: Not Currently     Partners: Male     Birth control/protection: None        Review of Systems     Objective   Vital Signs:   Pulse 107   Ht 162.6 cm (64\")   Wt 93.9 kg (207 lb)   SpO2 98%   BMI 35.53 kg/m²       Physical Exam  Constitutional:       Appearance: Normal appearance. The patient is well-developed and normal weight.   HENT:      Head: Normocephalic.      Right Ear: Hearing and external ear normal.      Left Ear: Hearing and external ear normal.      Nose: Nose normal.   Eyes:      Conjunctiva/sclera: Conjunctivae normal.   Cardiovascular:      Rate and Rhythm: Normal rate.   Pulmonary:      Effort: Pulmonary effort is normal.      Breath sounds: No wheezing or rales.   Abdominal:      Palpations: Abdomen is soft.      Tenderness: There is no abdominal tenderness.   Musculoskeletal:      Cervical back: Normal range of motion.   Skin:     Findings: No rash.   Neurological:      Mental Status: The patient is alert and oriented to person, place, and time.   Psychiatric:         Mood and Affect: Mood and " affect normal.         Judgment: Judgment normal.       Ortho Exam      Left lower extremity- ROM -10 to 110 degrees. 1+ laxity to varus and valgus stress. 1 + edema. Stable to anterior/posterior drawer. Positive EHL, FHL, GS and TA. Sensation intact to all 5 nerves of the foot. Positive pulses.     Procedures    X-Ray Report:  Left hip(s) X-Ray  Indication: Evaluation of left hip pain  AP and Lateral view(s)  Findings: mild to moderate degenerative changes to the hip.   Prior studies available for comparison: no     X-Ray Report:  Left knee(s) X-Ray  Indication: Evaluation of the left knee   AP, Lateral, Standing and Sunrise view(s)  Findings: severe degenerative changes with varus deformity. No acute fracture. Loose body/ calcification to the lateral knee.   Prior studies available for comparison: no         Imaging Results (Most Recent)     Procedure Component Value Units Date/Time    XR Knee 3 View Left [[id number]] Resulted: 09/09/22 1047     Updated: 09/09/22 1048    Narrative:      X-Ray Report:  Left knee(s) X-Ray  Indication: Evaluation of the left knee   AP, Lateral, Standing and Sunrise view(s)  Findings: severe degenerative changes with varus deformity. No acute   fracture. Loose body/ calcification to the lateral knee.   Prior studies available for comparison: no     XR Hip With or Without Pelvis 2 - 3 View Left [[id number]] Resulted: 09/09/22 1047     Updated: 09/09/22 1047    Narrative:      X-Ray Report:  Left hip(s) X-Ray  Indication: Evaluation of left hip pain  AP and Lateral view(s)  Findings: mild to moderate degenerative changes to the hip.   Prior studies available for comparison: no            Result Review :       XR Knee 3 View Left    Result Date: 9/9/2022  Narrative: X-Ray Report: Left knee(s) X-Ray Indication: Evaluation of the left knee AP, Lateral, Standing and Sunrise view(s) Findings: severe degenerative changes with varus deformity. No acute fracture. Loose body/ calcification to the  lateral knee. Prior studies available for comparison: no     XR Hip With or Without Pelvis 2 - 3 View Left    Result Date: 9/9/2022  Narrative: X-Ray Report: Left hip(s) X-Ray Indication: Evaluation of left hip pain AP and Lateral view(s) Findings: mild to moderate degenerative changes to the hip. Prior studies available for comparison: no              Assessment and Plan     Diagnoses and all orders for this visit:    1. Left hip pain (Primary)  -     XR Hip With or Without Pelvis 2 - 3 View Left    2. Left knee pain, unspecified chronicity  -     XR Knee 3 View Left    3. Primary osteoarthritis of left knee    4. Primary osteoarthritis of left hip        Discussed the treatment options with the patient, operative vs non-operative. I reviewed the x-rays that were obtained today with the patient. Discussed the risks and benefits of a Left Total Knee Arthroplasty. She will need clearance from her oncologist.     Educated on risk of smoking related to procedure. Discussed options for smoking cessation., Discussed surgery., Risks/benefits discussed with patient including, but not limited to: infection, bleeding, neurovascular damage, malunion, nonunion, aesthetic deformity, need for further surgery, and death., Discussed with patient the implant type being used during surgery and patient understands and desires to proceed., Surgery pamphlet given. and Call or return if worsening symptoms.    Follow Up     2 weeks postoperatively.        Patient was given instructions and counseling regarding her condition or for health maintenance advice. Please see specific information pulled into the AVS if appropriate.     Scribed for Gilles Shaw MD by Racheal Loving.  09/09/22   10:32 EDT    I have personally performed the services described in this document as scribed by the above individual and it is both accurate and complete. Gilles Shaw MD 09/11/22

## 2022-09-12 DIAGNOSIS — Z47.1 AFTERCARE FOLLOWING LEFT KNEE JOINT REPLACEMENT SURGERY: Primary | ICD-10-CM

## 2022-09-12 DIAGNOSIS — Z96.652 AFTERCARE FOLLOWING LEFT KNEE JOINT REPLACEMENT SURGERY: Primary | ICD-10-CM

## 2022-09-15 ENCOUNTER — APPOINTMENT (OUTPATIENT)
Dept: CT IMAGING | Facility: HOSPITAL | Age: 62
End: 2022-09-15

## 2022-09-15 ENCOUNTER — APPOINTMENT (OUTPATIENT)
Dept: GENERAL RADIOLOGY | Facility: HOSPITAL | Age: 62
End: 2022-09-15

## 2022-09-15 ENCOUNTER — HOSPITAL ENCOUNTER (EMERGENCY)
Facility: HOSPITAL | Age: 62
Discharge: HOME | End: 2022-09-15
Attending: EMERGENCY MEDICINE | Admitting: EMERGENCY MEDICINE

## 2022-09-15 VITALS
DIASTOLIC BLOOD PRESSURE: 94 MMHG | RESPIRATION RATE: 18 BRPM | HEIGHT: 64 IN | TEMPERATURE: 99 F | WEIGHT: 205.91 LBS | HEART RATE: 93 BPM | SYSTOLIC BLOOD PRESSURE: 142 MMHG | OXYGEN SATURATION: 92 % | BODY MASS INDEX: 35.15 KG/M2

## 2022-09-15 DIAGNOSIS — R07.9 CHEST PAIN, UNSPECIFIED TYPE: Primary | ICD-10-CM

## 2022-09-15 LAB
ALBUMIN SERPL-MCNC: 5 G/DL (ref 3.5–5.2)
ALBUMIN/GLOB SERPL: 1.6 G/DL
ALP SERPL-CCNC: 105 U/L (ref 39–117)
ALT SERPL W P-5'-P-CCNC: 13 U/L (ref 1–33)
ANION GAP SERPL CALCULATED.3IONS-SCNC: 10.3 MMOL/L (ref 5–15)
AST SERPL-CCNC: 17 U/L (ref 1–32)
BASOPHILS # BLD AUTO: 0.06 10*3/MM3 (ref 0–0.2)
BASOPHILS NFR BLD AUTO: 0.5 % (ref 0–1.5)
BILIRUB SERPL-MCNC: 0.5 MG/DL (ref 0–1.2)
BILIRUB UR QL STRIP: NEGATIVE
BUN SERPL-MCNC: 3 MG/DL (ref 8–23)
BUN/CREAT SERPL: 4.3 (ref 7–25)
CALCIUM SPEC-SCNC: 9.9 MG/DL (ref 8.6–10.5)
CHLORIDE SERPL-SCNC: 103 MMOL/L (ref 98–107)
CLARITY UR: CLEAR
CO2 SERPL-SCNC: 27.7 MMOL/L (ref 22–29)
COLOR UR: YELLOW
CREAT SERPL-MCNC: 0.7 MG/DL (ref 0.57–1)
DEPRECATED RDW RBC AUTO: 39.2 FL (ref 37–54)
EGFRCR SERPLBLD CKD-EPI 2021: 99.2 ML/MIN/1.73
EOSINOPHIL # BLD AUTO: 0.23 10*3/MM3 (ref 0–0.4)
EOSINOPHIL NFR BLD AUTO: 2.1 % (ref 0.3–6.2)
ERYTHROCYTE [DISTWIDTH] IN BLOOD BY AUTOMATED COUNT: 13 % (ref 12.3–15.4)
GLOBULIN UR ELPH-MCNC: 3.2 GM/DL
GLUCOSE SERPL-MCNC: 138 MG/DL (ref 65–99)
GLUCOSE UR STRIP-MCNC: NEGATIVE MG/DL
HCT VFR BLD AUTO: 43.4 % (ref 34–46.6)
HGB BLD-MCNC: 14.9 G/DL (ref 12–15.9)
HGB UR QL STRIP.AUTO: NEGATIVE
HOLD SPECIMEN: 11
HOLD SPECIMEN: 11
HOLD SPECIMEN: NORMAL
IMM GRANULOCYTES # BLD AUTO: 0.02 10*3/MM3 (ref 0–0.05)
IMM GRANULOCYTES NFR BLD AUTO: 0.2 % (ref 0–0.5)
KETONES UR QL STRIP: NEGATIVE
LEUKOCYTE ESTERASE UR QL STRIP.AUTO: NEGATIVE
LIPASE SERPL-CCNC: 24 U/L (ref 13–60)
LYMPHOCYTES # BLD AUTO: 2.55 10*3/MM3 (ref 0.7–3.1)
LYMPHOCYTES NFR BLD AUTO: 23.1 % (ref 19.6–45.3)
MAGNESIUM SERPL-MCNC: 2 MG/DL (ref 1.6–2.4)
MCH RBC QN AUTO: 28.7 PG (ref 26.6–33)
MCHC RBC AUTO-ENTMCNC: 34.3 G/DL (ref 31.5–35.7)
MCV RBC AUTO: 83.6 FL (ref 79–97)
MONOCYTES # BLD AUTO: 0.73 10*3/MM3 (ref 0.1–0.9)
MONOCYTES NFR BLD AUTO: 6.6 % (ref 5–12)
NEUTROPHILS NFR BLD AUTO: 67.5 % (ref 42.7–76)
NEUTROPHILS NFR BLD AUTO: 7.46 10*3/MM3 (ref 1.7–7)
NITRITE UR QL STRIP: NEGATIVE
NRBC BLD AUTO-RTO: 0 /100 WBC (ref 0–0.2)
NT-PROBNP SERPL-MCNC: 106.3 PG/ML (ref 0–900)
PH UR STRIP.AUTO: 6.5 [PH] (ref 5–8)
PLATELET # BLD AUTO: 254 10*3/MM3 (ref 140–450)
PMV BLD AUTO: 9.5 FL (ref 6–12)
POTASSIUM SERPL-SCNC: 3.8 MMOL/L (ref 3.5–5.2)
PROT SERPL-MCNC: 8.2 G/DL (ref 6–8.5)
PROT UR QL STRIP: NEGATIVE
RBC # BLD AUTO: 5.19 10*6/MM3 (ref 3.77–5.28)
SODIUM SERPL-SCNC: 141 MMOL/L (ref 136–145)
SP GR UR STRIP: >1.03 (ref 1–1.03)
TROPONIN T SERPL-MCNC: <0.01 NG/ML (ref 0–0.03)
UROBILINOGEN UR QL STRIP: ABNORMAL
WBC NRBC COR # BLD: 11.05 10*3/MM3 (ref 3.4–10.8)
WHOLE BLOOD HOLD COAG: 11
WHOLE BLOOD HOLD SPECIMEN: 11

## 2022-09-15 PROCEDURE — 83690 ASSAY OF LIPASE: CPT | Performed by: EMERGENCY MEDICINE

## 2022-09-15 PROCEDURE — 70450 CT HEAD/BRAIN W/O DYE: CPT

## 2022-09-15 PROCEDURE — 96374 THER/PROPH/DIAG INJ IV PUSH: CPT

## 2022-09-15 PROCEDURE — 93005 ELECTROCARDIOGRAM TRACING: CPT | Performed by: EMERGENCY MEDICINE

## 2022-09-15 PROCEDURE — 93010 ELECTROCARDIOGRAM REPORT: CPT | Performed by: INTERNAL MEDICINE

## 2022-09-15 PROCEDURE — 99284 EMERGENCY DEPT VISIT MOD MDM: CPT

## 2022-09-15 PROCEDURE — 94640 AIRWAY INHALATION TREATMENT: CPT

## 2022-09-15 PROCEDURE — 71045 X-RAY EXAM CHEST 1 VIEW: CPT

## 2022-09-15 PROCEDURE — 85025 COMPLETE CBC W/AUTO DIFF WBC: CPT | Performed by: EMERGENCY MEDICINE

## 2022-09-15 PROCEDURE — 25010000002 METHYLPREDNISOLONE PER 125 MG: Performed by: EMERGENCY MEDICINE

## 2022-09-15 PROCEDURE — 81003 URINALYSIS AUTO W/O SCOPE: CPT | Performed by: EMERGENCY MEDICINE

## 2022-09-15 PROCEDURE — 80053 COMPREHEN METABOLIC PANEL: CPT | Performed by: EMERGENCY MEDICINE

## 2022-09-15 PROCEDURE — 71260 CT THORAX DX C+: CPT

## 2022-09-15 PROCEDURE — 84484 ASSAY OF TROPONIN QUANT: CPT | Performed by: EMERGENCY MEDICINE

## 2022-09-15 PROCEDURE — 0 IOPAMIDOL PER 1 ML: Performed by: EMERGENCY MEDICINE

## 2022-09-15 PROCEDURE — 83880 ASSAY OF NATRIURETIC PEPTIDE: CPT | Performed by: EMERGENCY MEDICINE

## 2022-09-15 PROCEDURE — 83735 ASSAY OF MAGNESIUM: CPT | Performed by: EMERGENCY MEDICINE

## 2022-09-15 RX ORDER — PREDNISONE 20 MG/1
60 TABLET ORAL DAILY
Qty: 15 TABLET | Refills: 0 | Status: SHIPPED | OUTPATIENT
Start: 2022-09-15

## 2022-09-15 RX ORDER — IPRATROPIUM BROMIDE AND ALBUTEROL SULFATE 2.5; .5 MG/3ML; MG/3ML
3 SOLUTION RESPIRATORY (INHALATION) ONCE
Status: COMPLETED | OUTPATIENT
Start: 2022-09-15 | End: 2022-09-15

## 2022-09-15 RX ORDER — METHYLPREDNISOLONE SODIUM SUCCINATE 125 MG/2ML
125 INJECTION, POWDER, LYOPHILIZED, FOR SOLUTION INTRAMUSCULAR; INTRAVENOUS ONCE
Status: COMPLETED | OUTPATIENT
Start: 2022-09-15 | End: 2022-09-15

## 2022-09-15 RX ORDER — ALPRAZOLAM 0.25 MG/1
0.5 TABLET ORAL ONCE
Status: COMPLETED | OUTPATIENT
Start: 2022-09-15 | End: 2022-09-15

## 2022-09-15 RX ORDER — AZITHROMYCIN 250 MG/1
TABLET, FILM COATED ORAL
Qty: 6 TABLET | Refills: 0 | Status: SHIPPED | OUTPATIENT
Start: 2022-09-15 | End: 2022-09-20

## 2022-09-15 RX ORDER — ASPIRIN 81 MG/1
324 TABLET, CHEWABLE ORAL ONCE
Status: DISCONTINUED | OUTPATIENT
Start: 2022-09-15 | End: 2022-09-15 | Stop reason: HOSPADM

## 2022-09-15 RX ORDER — SODIUM CHLORIDE 0.9 % (FLUSH) 0.9 %
10 SYRINGE (ML) INJECTION AS NEEDED
Status: DISCONTINUED | OUTPATIENT
Start: 2022-09-15 | End: 2022-09-15 | Stop reason: HOSPADM

## 2022-09-15 RX ADMIN — METHYLPREDNISOLONE SODIUM SUCCINATE 125 MG: 125 INJECTION, POWDER, FOR SOLUTION INTRAMUSCULAR; INTRAVENOUS at 14:20

## 2022-09-15 RX ADMIN — IOPAMIDOL 73 ML: 755 INJECTION, SOLUTION INTRAVENOUS at 16:11

## 2022-09-15 RX ADMIN — ALPRAZOLAM 0.5 MG: 0.25 TABLET ORAL at 17:39

## 2022-09-15 RX ADMIN — IPRATROPIUM BROMIDE AND ALBUTEROL SULFATE 3 ML: 2.5; .5 SOLUTION RESPIRATORY (INHALATION) at 13:58

## 2022-09-15 NOTE — ED PROVIDER NOTES
Time: 1:21 PM EDT  Arrived by: private car  Chief Complaint: chest pain  History provided by: Pt  History is limited by: N/A     History of Present Illness:  Patient is a 62 y.o. year old female who presents to the emergency department with chest pain that started about 2 hours ago. Pt reports the pain was a squeezing sensation but is not hurting at this time. Pt denies any pain with deep inspiration. Pt was at urgent care initially and had a normal EKG while she was there. Pt has a hx of lung cancer and gets treatment every 3 weeks for the cancer. Pt reports she is on antibiotics and steroids for bronchitis. Pt denies hx of heart attacks.       Similar Symptoms Previously: yes  Recently seen: yes      Patient Care Team  Primary Care Provider: Provider, No Known    Past Medical History:     No Known Allergies  Past Medical History:   Diagnosis Date   • Afib ([facility])    • Allergic    • Anxiety    • Arthritis    • Bipolar 1 disorder ([facility])    • Cancer ([facility])    • COPD (chronic obstructive pulmonary disease) ([facility])    • Depression    • DVT (deep venous thrombosis) ([facility])    • Hyperlipidemia    • Hypertension    • Pneumonia    • Thyroid disease      Past Surgical History:   Procedure Laterality Date   • CARPAL TUNNEL RELEASE     • HEEL SPUR SURGERY Left      Family History   Problem Relation Age of Onset   • Heart disease Mother    • Diabetes Mother    • Cancer Father    • Stroke Father    • Heart disease Father    • Diabetes Father        Home Medications:  Prior to Admission medications    Medication Sig Start Date End Date Taking? Authorizing Provider   albuterol (ACCUNEB) 1.25 MG/3ML nebulizer solution Take 3 mL by nebulization Every 6 (Six) Hours As Needed for Wheezing. 7/18/22   Sandro Dejesus MD   albuterol sulfate  (90 Base) MCG/ACT inhaler Inhale 2 puffs Every 6 (Six) Hours As Needed for Shortness of Air. 8/25/22   Rajiv Bueno APRN   ALPRAZolam (XANAX) 0.5 MG tablet Take 1 mg by mouth 3 (Three)  Times a Day As Needed for Anxiety.    Jus Olmedo MD   ALPRAZolam (XANAX) 1 MG tablet Take 1 mg by mouth 3 (Three) Times a Day. 8/26/22   Jus Olmedo MD   amLODIPine (NORVASC) 5 MG tablet Take 1 tablet by mouth Daily. 8/25/22   Rajiv Bueno APRN   atorvastatin (LIPITOR) 40 MG tablet Take 1 tablet by mouth Daily. 8/25/22   Rajiv Bueno APRN   buprenorphine-naloxone (SUBOXONE) 8-2 MG per SL tablet Place 1 tablet under the tongue Daily.    Jus Olmedo MD   citalopram (CeleXA) 10 MG tablet Take 10 mg by mouth Daily.    Jus Olmedo MD   dilTIAZem CD (CARDIZEM CD) 180 MG 24 hr capsule Take 180 mg by mouth Daily.    Jus Olmedo MD   docusate sodium (COLACE) 100 MG capsule Take 100 mg by mouth 2 (Two) Times a Day As Needed for Constipation.    Jus Olmedo MD   furosemide (LASIX) 40 MG tablet Take 2 tablets by mouth 2 (Two) Times a Day. 8/25/22   Rajvi Bueno APRN   hydrOXYzine (ATARAX) 25 MG tablet Take 25 mg by mouth 3 (Three) Times a Day As Needed for Itching.    Jus Olmeod MD   levothyroxine (SYNTHROID, LEVOTHROID) 150 MCG tablet Take 150 mcg by mouth Daily.    Jus Olmedo MD   lisinopril (PRINIVIL,ZESTRIL) 20 MG tablet Take 1 tablet by mouth Daily. 8/25/22   Rajiv Bueno APRN   metFORMIN (GLUCOPHAGE) 500 MG tablet Take 1 tablet by mouth 2 (Two) Times a Day With Meals. 8/25/22   Rajiv Bueno APRN   ondansetron (ZOFRAN) 4 MG tablet Take 4 mg by mouth Every 8 (Eight) Hours As Needed for Nausea or Vomiting.    Jus Olmedo MD   potassium chloride 10 MEQ CR tablet Take 1 tablet by mouth Daily. 8/25/22   Rajiv Bueno APRN   predniSONE (DELTASONE) 20 MG tablet Take 60 mg by mouth Daily. For 5 days    Jus Olmedo MD   traZODone (DESYREL) 150 MG tablet Take 150 mg by mouth Every Night.    Jus Olmedo MD   oxyCODONE (ROXICODONE) 10 MG tablet Take 10 mg by mouth  "Every 6 (Six) Hours As Needed for Moderate Pain .  9/15/22  Jus Olmedo MD   oxyCODONE-acetaminophen (PERCOCET)  MG per tablet Take 1 tablet by mouth Every 4 (Four) Hours As Needed for Moderate Pain .  9/15/22  Jus Olmedo MD        Social History:   Social History     Tobacco Use   • Smoking status: Current Every Day Smoker     Packs/day: 1.50     Years: 35.00     Pack years: 52.50     Types: Cigarettes     Start date: 10/23/1986   • Smokeless tobacco: Never Used   Vaping Use   • Vaping Use: Never used   Substance Use Topics   • Alcohol use: Not Currently   • Drug use: Not Currently     Recent travel: no     Review of Systems:  Review of Systems   Constitutional: Negative for chills and fever.   HENT: Negative for congestion, rhinorrhea and sore throat.    Eyes: Negative for pain and visual disturbance.   Respiratory: Negative for apnea, cough, chest tightness and shortness of breath.    Cardiovascular: Positive for chest pain. Negative for palpitations.   Gastrointestinal: Negative for abdominal pain, diarrhea, nausea and vomiting.   Genitourinary: Negative for difficulty urinating and dysuria.   Musculoskeletal: Negative for joint swelling and myalgias.   Skin: Negative for color change.   Neurological: Negative for seizures and headaches.   Psychiatric/Behavioral: Negative.    All other systems reviewed and are negative.       Physical Exam:  /94   Pulse 93   Temp 99 °F (37.2 °C) (Oral)   Resp 18   Ht 162.6 cm (64\")   Wt 93.4 kg (205 lb 14.6 oz)   SpO2 92%   BMI 35.34 kg/m²     Physical Exam  Vitals and nursing note reviewed.   Constitutional:       General: She is not in acute distress.     Appearance: Normal appearance. She is not toxic-appearing.   HENT:      Head: Normocephalic and atraumatic.      Jaw: There is normal jaw occlusion.   Eyes:      General: Lids are normal.      Extraocular Movements: Extraocular movements intact.      Conjunctiva/sclera: Conjunctivae " normal.      Pupils: Pupils are equal, round, and reactive to light.   Cardiovascular:      Rate and Rhythm: Normal rate and regular rhythm.      Pulses: Normal pulses.      Heart sounds: Normal heart sounds.   Pulmonary:      Effort: Pulmonary effort is normal. No respiratory distress.      Breath sounds: Examination of the right-lower field reveals wheezing. Examination of the left-lower field reveals wheezing. Wheezing present. No rhonchi (bilateral).   Abdominal:      General: Abdomen is flat.      Palpations: Abdomen is soft.      Tenderness: There is no abdominal tenderness. There is no guarding or rebound.   Musculoskeletal:         General: Normal range of motion.      Cervical back: Normal range of motion and neck supple.      Right lower leg: No edema.      Left lower leg: No edema.   Skin:     General: Skin is warm and dry.   Neurological:      Mental Status: She is alert and oriented to person, place, and time. Mental status is at baseline.   Psychiatric:         Mood and Affect: Mood normal.                Medications in the Emergency Department:  Medications   sodium chloride 0.9 % flush 10 mL (has no administration in time range)   aspirin chewable tablet 324 mg (324 mg Oral Not Given 9/15/22 1402)   ipratropium-albuterol (DUO-NEB) nebulizer solution 3 mL (3 mL Nebulization Given 9/15/22 1358)   methylPREDNISolone sodium succinate (SOLU-Medrol) injection 125 mg (125 mg Intravenous Given 9/15/22 1420)   iopamidol (ISOVUE-370) 76 % injection 100 mL (73 mL Intravenous Given 9/15/22 1611)   ALPRAZolam (XANAX) tablet 0.5 mg (0.5 mg Oral Given 9/15/22 1739)        Labs  Lab Results (last 24 hours)     Procedure Component Value Units Date/Time    CBC & Differential [[id number]]  (Abnormal) Collected: 09/15/22 1427    Specimen: Blood Updated: 09/15/22 1434    Narrative:      The following orders were created for panel order CBC & Differential.  Procedure                               Abnormality         Status                      ---------                               -----------         ------                     CBC Auto Differential[[id number]]        Abnormal            Final result                 Please view results for these tests on the individual orders.    Lipase [[id number]]  (Normal) Collected: 09/15/22 1427    Specimen: Blood Updated: 09/15/22 1457     Lipase 24 U/L     BNP [[id number]]  (Normal) Collected: 09/15/22 1427    Specimen: Blood Updated: 09/15/22 1453     proBNP 106.3 pg/mL     Narrative:      Among patients with dyspnea, NT-proBNP is highly sensitive for the detection of acute congestive heart failure. In addition NT-proBNP of <300 pg/ml effectively rules out acute congestive heart failure with 99% negative predictive value.    Results may be falsely decreased if patient taking Biotin.      Magnesium [[id number]]  (Normal) Collected: 09/15/22 1427    Specimen: Blood Updated: 09/15/22 1456     Magnesium 2.0 mg/dL     CBC Auto Differential [[id number]]  (Abnormal) Collected: 09/15/22 1427    Specimen: Blood Updated: 09/15/22 1434     WBC 11.05 10*3/mm3      RBC 5.19 10*6/mm3      Hemoglobin 14.9 g/dL      Hematocrit 43.4 %      MCV 83.6 fL      MCH 28.7 pg      MCHC 34.3 g/dL      RDW 13.0 %      RDW-SD 39.2 fl      MPV 9.5 fL      Platelets 254 10*3/mm3      Neutrophil % 67.5 %      Lymphocyte % 23.1 %      Monocyte % 6.6 %      Eosinophil % 2.1 %      Basophil % 0.5 %      Immature Grans % 0.2 %      Neutrophils, Absolute 7.46 10*3/mm3      Lymphocytes, Absolute 2.55 10*3/mm3      Monocytes, Absolute 0.73 10*3/mm3      Eosinophils, Absolute 0.23 10*3/mm3      Basophils, Absolute 0.06 10*3/mm3      Immature Grans, Absolute 0.02 10*3/mm3      nRBC 0.0 /100 WBC     POC Troponin I with Hold Tube [[id number]] Collected: 09/15/22 1517    Specimen: Blood Updated: 09/15/22 1524    Narrative:      The following orders were created for panel order POC Troponin I with Hold Tube.  Procedure                                Abnormality         Status                     ---------                               -----------         ------                     POC Troponin I[[id number]]                                                              HOLD Troponin-I Tube[[id number]]                             Final result                 Please view results for these tests on the individual orders.    Comprehensive Metabolic Panel [[id number]]  (Abnormal) Collected: 09/15/22 1517    Specimen: Blood Updated: 09/15/22 1555     Glucose 138 mg/dL      BUN 3 mg/dL      Creatinine 0.70 mg/dL      Sodium 141 mmol/L      Potassium 3.8 mmol/L      Comment: Slight hemolysis detected by analyzer. Results may be affected.        Chloride 103 mmol/L      CO2 27.7 mmol/L      Calcium 9.9 mg/dL      Total Protein 8.2 g/dL      Albumin 5.00 g/dL      ALT (SGPT) 13 U/L      AST (SGOT) 17 U/L      Alkaline Phosphatase 105 U/L      Total Bilirubin 0.5 mg/dL      Globulin 3.2 gm/dL      A/G Ratio 1.6 g/dL      BUN/Creatinine Ratio 4.3     Anion Gap 10.3 mmol/L      eGFR 99.2 mL/min/1.73      Comment: National Kidney Foundation and American Society of Nephrology (ASN) Task Force recommended calculation based on the Chronic Kidney Disease Epidemiology Collaboration (CKD-EPI) equation refit without adjustment for race.       Narrative:      GFR Normal >60  Chronic Kidney Disease <60  Kidney Failure <15      Troponin [[id number]]  (Normal) Collected: 09/15/22 1517    Specimen: Blood Updated: 09/15/22 1555     Troponin T <0.010 ng/mL     Narrative:      Troponin T Reference Range:  <= 0.03 ng/mL-   Negative for AMI  >0.03 ng/mL-     Abnormal for myocardial necrosis.  Clinicians would have to utilize clinical acumen, EKG, Troponin and serial changes to determine if it is an Acute Myocardial Infarction or myocardial injury due to an underlying chronic condition.       Results may be falsely decreased if patient taking Biotin.      Urinalysis With Culture If Indicated  - Urine, Clean Catch [[id number]]  (Abnormal) Collected: 09/15/22 1739    Specimen: Urine, Clean Catch Updated: 09/15/22 1754     Color, UA Yellow     Appearance, UA Clear     pH, UA 6.5     Specific Gravity, UA >1.030     Glucose, UA Negative     Ketones, UA Negative     Bilirubin, UA Negative     Blood, UA Negative     Protein, UA Negative     Leuk Esterase, UA Negative     Nitrite, UA Negative     Urobilinogen, UA 0.2 E.U./dL    Narrative:      In absence of clinical symptoms, the presence of pyuria, bacteria, and/or nitrites on the urinalysis result does not correlate with infection.  Urine microscopic not indicated.           Imaging:  CT Head Without Contrast    Result Date: 9/15/2022  PROCEDURE: CT HEAD WO CONTRAST  COMPARISON:  [facility], CT, CT CHEST W CONTRAST DIAGNOSTIC, 9/15/2022, 16:00. INDICATIONS: new mental status change  PROTOCOL:   Standard imaging protocol performed    RADIATION:   DLP: 955.1mGy*cm   MA and/or KV was adjusted to minimize radiation dose.     TECHNIQUE: Axial images of the head without intravenous contrast.  FINDINGS:  There is mild generalized atrophy.  There is residual contrast from a CT scan of the chest performed earlier this same day.  The ventricles have a normal size and configuration. There is no evidence of acute intracranial hemorrhage, mass or midline shift. No extra-axial fluid collections are identified. There are no skull fractures. The visualized paranasal sinuses and mastoid air cells are grossly clear.  IMPRESSION:  No acute intracranial abnormalities are identified.  AGATA THOMPSON MD       Electronically Signed and Approved By: AGATA THOMPSON MD on 9/15/2022 at 18:02             CT Chest With Contrast Diagnostic    Result Date: 9/15/2022  PROCEDURE: CT CHEST W CONTRAST DIAGNOSTIC  COMPARISON:  [facility], CR, XR CHEST 1 VW, 9/15/2022, 13:47.  [facility], CT, CT CHEST W CONTRAST DIAGNOSTIC, 7/18/2022, 6:59.   INDICATIONS: PE suspected, lung cancer, chest pain today  TECHNIQUE: CT images were obtained with non-ionic intravenous contrast material.   PROTOCOL:   PE protocol performed.    RADIATION:   DLP: 367mGy*cm   Automated exposure control was utilized to minimize radiation dose. CONTRAST: 73cc Isovue 370 I.V. LABS:   eGFR: 85ml/min/1.73m2  FINDINGS:  The pulmonary arteries are well opacified.  No filling defects are seen.  There are no findings of PE.  Lung window images reveal subsegmental atelectasis and/or linear fibrosis in the medial right middle lobe.  No pulmonary mass or consolidation is evident.  Abnormal thickening of bronchial structures is seen in the rafael and right bronchial tree, suspicious for malignancy.  No distinct mass or adenopathy is seen.  Extensive coronary artery calcifications are evident.  3 cm right adrenal nodule containing coarse calcifications is stable.        CT scan of the chest with IV contrast demonstrating no findings of PE.  Abnormal thickening of bronchial structures is seen in the rafael and right bronchial tree suspicious for malignancy.     CINDY AVILEZ MD       Electronically Signed and Approved By: CINDY AVILEZ MD on 9/15/2022 at 16:52             XR Chest 1 View    Result Date: 9/15/2022  PROCEDURE: XR CHEST 1 VW  COMPARISON: [facility], CR, XR CHEST 1 VW, 7/18/2022, 1:07.  INDICATIONS: Chest Pain Triage Protocol  FINDINGS:  The heart is normal in size.  The lungs are well-expanded and free of infiltrates.  Mild chronic changes appear stable.  Right jugular Port-A-Cath device is in unchanged position, the tip is at the cavoatrial junction.  Bony structures appear intact.        No active cardiopulmonary disease is seen.  Right jugular Port-A-Cath tip is at the cavoatrial junction.       CINDY AVILEZ MD       Electronically Signed and Approved By: CINDY AVILEZ MD on 9/15/2022 at 14:01               Procedures:  Procedures    Progress  ED Course as of  09/15/22 1849   Thu Sep 15, 2022   1416 EKG:    Rhythm: Sinus  Rate: Normal  Intervals: Normal  ST Segment: Normal  Good tracing  EKG Comparison: Unchanged    Interpreted by me   [TC]      ED Course User Index  [TC] Sai Reaves MD                            Medical Decision Making:  MDM  Number of Diagnoses or Management Options  Chest pain, unspecified type  Diagnosis management comments: In summary this is a 62-year-old female who presents emerged department for evaluation of chest pain.  She has known lung cancer and is currently being treated.  CT of the chest is negative for pulmonary embolus, chest x-ray is unremarkable.  CBC CMP and troponin are all unremarkable as well.  Patient says she does feel better after breathing treatment and steroids.  She has had some mild confusion according to daughter as well therefore urinalysis and CT brain were also ordered and reviewed.  These are also unremarkable.  Very strict return to ER and follow-up instructions have been provided to the patient.         Amount and/or Complexity of Data Reviewed  Clinical lab tests: reviewed  Tests in the radiology section of CPT®: reviewed  Tests in the medicine section of CPT®: reviewed         Final diagnoses:   Chest pain, unspecified type        Disposition:  ED Disposition     ED Disposition   Discharge    Condition   Stable    Comment   --             This medical record created using voice recognition software.             Milo Ordoñez  09/15/22 1478       Sai Reaves MD  09/15/22 7667

## 2022-09-22 LAB — QT INTERVAL: 392 MS

## 2022-09-23 DIAGNOSIS — Z01.818 ENCOUNTER FOR PREADMISSION TESTING: Primary | ICD-10-CM

## 2022-09-26 ENCOUNTER — PRE-ADMISSION TESTING (OUTPATIENT)
Dept: PREADMISSION TESTING | Facility: HOSPITAL | Age: 62
End: 2022-09-26

## 2022-09-26 DIAGNOSIS — Z01.818 ENCOUNTER FOR PREADMISSION TESTING: ICD-10-CM

## 2022-09-26 LAB
ALBUMIN SERPL-MCNC: 4.7 G/DL (ref 3.5–5.2)
ALBUMIN/GLOB SERPL: 1.7 G/DL
ALP SERPL-CCNC: 111 U/L (ref 39–117)
ALT SERPL W P-5'-P-CCNC: 13 U/L (ref 1–33)
ANION GAP SERPL CALCULATED.3IONS-SCNC: 14.7 MMOL/L (ref 5–15)
AST SERPL-CCNC: 16 U/L (ref 1–32)
BASOPHILS # BLD AUTO: 0.06 10*3/MM3 (ref 0–0.2)
BASOPHILS NFR BLD AUTO: 0.8 % (ref 0–1.5)
BILIRUB SERPL-MCNC: 0.2 MG/DL (ref 0–1.2)
BUN SERPL-MCNC: 10 MG/DL (ref 8–23)
BUN/CREAT SERPL: 14.3 (ref 7–25)
CALCIUM SPEC-SCNC: 9.3 MG/DL (ref 8.6–10.5)
CHLORIDE SERPL-SCNC: 103 MMOL/L (ref 98–107)
CO2 SERPL-SCNC: 27.3 MMOL/L (ref 22–29)
CREAT SERPL-MCNC: 0.7 MG/DL (ref 0.57–1)
DEPRECATED RDW RBC AUTO: 43.5 FL (ref 37–54)
EGFRCR SERPLBLD CKD-EPI 2021: 99.2 ML/MIN/1.73
EOSINOPHIL # BLD AUTO: 0.47 10*3/MM3 (ref 0–0.4)
EOSINOPHIL NFR BLD AUTO: 6.4 % (ref 0.3–6.2)
ERYTHROCYTE [DISTWIDTH] IN BLOOD BY AUTOMATED COUNT: 13.5 % (ref 12.3–15.4)
GLOBULIN UR ELPH-MCNC: 2.8 GM/DL
GLUCOSE SERPL-MCNC: 86 MG/DL (ref 65–99)
HBA1C MFR BLD: 6.7 % (ref 4.8–5.6)
HCT VFR BLD AUTO: 43.3 % (ref 34–46.6)
HGB BLD-MCNC: 13.9 G/DL (ref 12–15.9)
IMM GRANULOCYTES # BLD AUTO: 0.02 10*3/MM3 (ref 0–0.05)
IMM GRANULOCYTES NFR BLD AUTO: 0.3 % (ref 0–0.5)
INR PPP: 1.06 (ref 0.86–1.15)
LYMPHOCYTES # BLD AUTO: 2.48 10*3/MM3 (ref 0.7–3.1)
LYMPHOCYTES NFR BLD AUTO: 33.5 % (ref 19.6–45.3)
MCH RBC QN AUTO: 28.3 PG (ref 26.6–33)
MCHC RBC AUTO-ENTMCNC: 32.1 G/DL (ref 31.5–35.7)
MCV RBC AUTO: 88 FL (ref 79–97)
MONOCYTES # BLD AUTO: 0.44 10*3/MM3 (ref 0.1–0.9)
MONOCYTES NFR BLD AUTO: 5.9 % (ref 5–12)
NEUTROPHILS NFR BLD AUTO: 3.93 10*3/MM3 (ref 1.7–7)
NEUTROPHILS NFR BLD AUTO: 53.1 % (ref 42.7–76)
NRBC BLD AUTO-RTO: 0 /100 WBC (ref 0–0.2)
PLATELET # BLD AUTO: 255 10*3/MM3 (ref 140–450)
PMV BLD AUTO: 10.7 FL (ref 6–12)
POTASSIUM SERPL-SCNC: 3.5 MMOL/L (ref 3.5–5.2)
PROT SERPL-MCNC: 7.5 G/DL (ref 6–8.5)
PROTHROMBIN TIME: 14 SECONDS (ref 11.8–14.9)
RBC # BLD AUTO: 4.92 10*6/MM3 (ref 3.77–5.28)
SODIUM SERPL-SCNC: 145 MMOL/L (ref 136–145)
WBC NRBC COR # BLD: 7.4 10*3/MM3 (ref 3.4–10.8)

## 2022-09-26 PROCEDURE — 83036 HEMOGLOBIN GLYCOSYLATED A1C: CPT

## 2022-09-26 PROCEDURE — 36415 COLL VENOUS BLD VENIPUNCTURE: CPT

## 2022-09-26 PROCEDURE — 80053 COMPREHEN METABOLIC PANEL: CPT

## 2022-09-26 PROCEDURE — 85025 COMPLETE CBC W/AUTO DIFF WBC: CPT

## 2022-09-26 PROCEDURE — 85610 PROTHROMBIN TIME: CPT

## 2022-09-26 ASSESSMENT — KOOS JR
KOOS JR SCORE: 27
KOOS JR SCORE: 8.291

## 2022-09-26 NOTE — NURSING NOTE
When questioned do you ever wished to be dead and suicidal thoughts past month patient responded yes.  Call placed at 1120 am to Ann AGUSTIN With social work to come speak with patient. Patient denied any plan just very tired and in pain.  Patient has appointment with psychiatrist Dr. Haynes 09/27/22 at 10:00 (office contacted by Nicole MO Charge nurse).   Ann came and talked to patient in private about social issues and she stated that she gave her resources and contact info if she needed help.  I offered twice for patient to go to ED if help needed at any time. Currently declining to go at this time.  My direct number into office also given to patient. Charge nurse Nicole Gambino and nurse Cheryl BETTENCOURT for Dr. hSaw also made aware of situation.

## 2022-09-26 NOTE — DISCHARGE INSTRUCTIONS
IMPORTANT INSTRUCTIONS - PRE-ADMISSION TESTING  DO NOT EAT OR CHEW anything after midnight the night before your procedure.    You may have CLEAR liquids up to _3__ hours prior to ARRIVAL time.   Take the following medications the morning of your procedure with JUST A SIP OF WATER:  HYDROXYZINE, XANAX, NORVASC, CITALOPRAM, DILTIAZEM, COLACE, ZOFRAN ______________    DO NOT BRING your medications to the hospital with you, UNLESS something has changed since your PRE-Admission Testing appointment.  Hold all vitamins, supplements, and NSAIDS (Non- steroidal anti-inflammatory meds) for one week prior to surgery (you MAY take Tylenol or Acetaminophen).  If you are diabetic, check your blood sugar the morning of your procedure. If it is less than 70 or if you are feeling symptomatic, call the following number for further instructions: [phone].  Use your inhalers/nebulizers as usual, the morning of your procedure. BRING YOUR INHALERS with you.   Bring your CPAP or BIPAP to hospital, ONLY IF YOU WILL BE SPENDING THE NIGHT.   Make sure you have a ride home and have someone who will stay with you the day of your procedure after you go home.  If you have any questions, please call your Pre-Admission Testing NurseALAN at [phone]  [phone] .   Per anesthesia request, do not smoke for 24 hours before your procedure or as instructed by your surgeon.     Clear Liquid Diet        Find out when you need to start a clear liquid diet.   Think of “clear liquids” as anything you could read a newspaper through. This includes things like water, broth, sports drinks, or tea WITHOUT any kind of milk or cream.           Once you are told to start a clear liquid diet, only drink these things until 3 hours before arrival to the hospital or when the hospital says to stop. Total volume limitation: 8 oz.       Clear liquids you CAN drink:   Water   Clear broth: beef, chicken, vegetable, or bone broth with nothing in it    Gatorade   Lemonade or Bruno-aid   Soda   Tea, coffee (NO cream or honey)   Jell-O (without fruit)   Popsicles (without fruit or cream)   Italian ices   Juice without pulp: apple, white, grape   You may use salt, pepper, and sugar    Do NOT drink:   Milk or cream   Soy milk, almond milk, coconut milk, or other non-dairy drinks and   creamers   Milkshakes or smoothies   Tomato juice   Orange juice   Grapefruit juice   Cream soups or any other than broth         Clear Liquid Diet:  Do NOT eat any solid food.  Do NOT eat or suck on mints or candy.  Do NOT chew gum.  Do NOT drink thick liquids like milk or juice with pulp in it.  Do NOT add milk, cream, or anything like soy milk or almond milk to coffee or tea.

## 2022-09-27 ENCOUNTER — ANESTHESIA EVENT (OUTPATIENT)
Dept: PERIOP | Facility: HOSPITAL | Age: 62
End: 2022-09-27

## 2022-09-27 RX ORDER — TRANEXAMIC ACID 10 MG/ML
2000 INJECTION, SOLUTION INTRAVENOUS ONCE
Status: CANCELLED | OUTPATIENT
Start: 2022-09-27

## 2022-09-28 ENCOUNTER — TELEPHONE (OUTPATIENT)
Dept: ORTHOPEDIC SURGERY | Facility: CLINIC | Age: 62
End: 2022-09-28

## 2022-09-28 ENCOUNTER — HOSPITAL ENCOUNTER (OUTPATIENT)
Facility: HOSPITAL | Age: 62
Discharge: HOME | End: 2022-09-28
Attending: ORTHOPAEDIC SURGERY | Admitting: ORTHOPAEDIC SURGERY

## 2022-09-28 ENCOUNTER — ANESTHESIA (OUTPATIENT)
Dept: PERIOP | Facility: HOSPITAL | Age: 62
End: 2022-09-28

## 2022-09-28 VITALS
TEMPERATURE: 97.9 F | SYSTOLIC BLOOD PRESSURE: 114 MMHG | HEIGHT: 64 IN | WEIGHT: 209.66 LBS | DIASTOLIC BLOOD PRESSURE: 74 MMHG | OXYGEN SATURATION: 100 % | RESPIRATION RATE: 18 BRPM | HEART RATE: 58 BPM | BODY MASS INDEX: 35.79 KG/M2

## 2022-09-28 DIAGNOSIS — M17.12 PRIMARY OSTEOARTHRITIS OF LEFT KNEE: ICD-10-CM

## 2022-09-28 LAB — GLUCOSE BLDC GLUCOMTR-MCNC: 70 MG/DL (ref 70–99)

## 2022-09-28 PROCEDURE — A9270 NON-COVERED ITEM OR SERVICE: HCPCS | Performed by: ANESTHESIOLOGY

## 2022-09-28 PROCEDURE — 63710000001 CELECOXIB 100 MG CAPSULE: Performed by: ANESTHESIOLOGY

## 2022-09-28 PROCEDURE — 63710000001 ACETAMINOPHEN 500 MG TABLET: Performed by: ANESTHESIOLOGY

## 2022-09-28 PROCEDURE — 82962 GLUCOSE BLOOD TEST: CPT

## 2022-09-28 PROCEDURE — G0463 HOSPITAL OUTPT CLINIC VISIT: HCPCS | Performed by: ORTHOPAEDIC SURGERY

## 2022-09-28 RX ORDER — SODIUM CHLORIDE, SODIUM LACTATE, POTASSIUM CHLORIDE, CALCIUM CHLORIDE 600; 310; 30; 20 MG/100ML; MG/100ML; MG/100ML; MG/100ML
9 INJECTION, SOLUTION INTRAVENOUS CONTINUOUS PRN
Status: DISCONTINUED | OUTPATIENT
Start: 2022-09-28 | End: 2022-09-28 | Stop reason: HOSPADM

## 2022-09-28 RX ORDER — POVIDONE-IODINE 10 MG/ML
SOLUTION TOPICAL ONCE
Status: COMPLETED | OUTPATIENT
Start: 2022-09-28 | End: 2022-09-28

## 2022-09-28 RX ORDER — CEFAZOLIN SODIUM 2 G/100ML
2 INJECTION, SOLUTION INTRAVENOUS ONCE
Status: DISCONTINUED | OUTPATIENT
Start: 2022-09-28 | End: 2022-09-28 | Stop reason: HOSPADM

## 2022-09-28 RX ORDER — MIDAZOLAM HYDROCHLORIDE 1 MG/ML
3 INJECTION INTRAMUSCULAR; INTRAVENOUS ONCE
Status: CANCELLED | OUTPATIENT
Start: 2022-09-28 | End: 2022-09-28

## 2022-09-28 RX ORDER — GLYCOPYRROLATE 0.2 MG/ML
0.2 INJECTION INTRAMUSCULAR; INTRAVENOUS
Status: DISCONTINUED | OUTPATIENT
Start: 2022-09-28 | End: 2022-09-28 | Stop reason: HOSPADM

## 2022-09-28 RX ORDER — DEXTROSE MONOHYDRATE AND SODIUM CHLORIDE 5; .45 G/100ML; G/100ML
75 INJECTION, SOLUTION INTRAVENOUS CONTINUOUS
Status: DISCONTINUED | OUTPATIENT
Start: 2022-09-28 | End: 2022-09-28 | Stop reason: HOSPADM

## 2022-09-28 RX ORDER — GLYCOPYRROLATE 0.2 MG/ML
0.2 INJECTION INTRAMUSCULAR; INTRAVENOUS
Status: CANCELLED | OUTPATIENT
Start: 2022-09-28

## 2022-09-28 RX ORDER — MIDAZOLAM HYDROCHLORIDE 1 MG/ML
2 INJECTION INTRAMUSCULAR; INTRAVENOUS ONCE
Status: DISCONTINUED | OUTPATIENT
Start: 2022-09-28 | End: 2022-09-28 | Stop reason: HOSPADM

## 2022-09-28 RX ORDER — CELECOXIB 100 MG/1
200 CAPSULE ORAL ONCE
Status: CANCELLED | OUTPATIENT
Start: 2022-09-28 | End: 2022-09-28

## 2022-09-28 RX ORDER — ACETAMINOPHEN 500 MG
500 TABLET ORAL ONCE
Status: CANCELLED | OUTPATIENT
Start: 2022-09-28 | End: 2022-09-28

## 2022-09-28 RX ORDER — SODIUM CHLORIDE, SODIUM LACTATE, POTASSIUM CHLORIDE, CALCIUM CHLORIDE 600; 310; 30; 20 MG/100ML; MG/100ML; MG/100ML; MG/100ML
9 INJECTION, SOLUTION INTRAVENOUS CONTINUOUS PRN
Status: CANCELLED | OUTPATIENT
Start: 2022-09-28

## 2022-09-28 RX ORDER — TRANEXAMIC ACID 10 MG/ML
2000 INJECTION, SOLUTION INTRAVENOUS ONCE
Status: DISCONTINUED | OUTPATIENT
Start: 2022-09-28 | End: 2022-09-28

## 2022-09-28 RX ORDER — ACETAMINOPHEN 500 MG
1000 TABLET ORAL ONCE
Status: COMPLETED | OUTPATIENT
Start: 2022-09-28 | End: 2022-09-28

## 2022-09-28 RX ORDER — CELECOXIB 100 MG/1
200 CAPSULE ORAL ONCE
Status: COMPLETED | OUTPATIENT
Start: 2022-09-28 | End: 2022-09-28

## 2022-09-28 RX ADMIN — DEXTROSE AND SODIUM CHLORIDE 75 ML/HR: 5; 450 INJECTION, SOLUTION INTRAVENOUS at 11:02

## 2022-09-28 RX ADMIN — ACETAMINOPHEN 1000 MG: 500 TABLET, FILM COATED ORAL at 10:58

## 2022-09-28 RX ADMIN — CELECOXIB 200 MG: 100 CAPSULE ORAL at 10:58

## 2022-09-28 RX ADMIN — POVIDONE-IODINE 4 APPLICATION: 10 SOLUTION TOPICAL at 10:13

## 2022-09-28 NOTE — TELEPHONE ENCOUNTER
ATTEMPTED TO CALL PATIENT SEVERAL TIMES, PHONE WENT STRAIGHT TO  AND MAILBOX WAS FULL. WAS ABLE TO GET IN TOUCH WITH DAUGHTER IN [facility] WHO WILL HAVE PATIENT OR PATIENT'S DAUGHTER GIVE THE OFFICE A CALL TO SCHEDULE APPOINTMENT FOR EVALUATION OF THE RIGHT KNEE.

## 2022-09-28 NOTE — TELEPHONE ENCOUNTER
PER DANIEL MALDONADO HAD TO CANCEL SURGERY FOR MS. BLUM ON 9/28/2022 DUE TO HAVING ISSUES WITH HER OPPOSITE KNEE, PATIENT WOULD NOT BE ABLE TO REHAB AFTER HER LEFT TKA DUE TO HER CURRENT ISSUE WITH HER RIGHT KNEE. PATIENT IS GOING TO BE COMING IN TO THE OFFICE TO BE SEEN FOR HER RIGHT KNEE PRIOR TO RESCHEDULING HER LEFT KNEE.

## 2022-09-28 NOTE — ANESTHESIA PREPROCEDURE EVALUATION
" Anesthesia Evaluation     Patient summary reviewed and Nursing notes reviewed   history of anesthetic complications (wakes up quick and wants to get up/escape):  NPO Solid Status: > 8 hours  NPO Liquid Status: > 2 hours           Airway   Mallampati: II  Dental    (+) poor dentition and upper dentures        Pulmonary    (+) pneumonia stable , a smoker Current Abstained day of surgery, COPD, rhonchi, wheezes,   Cardiovascular - normal exam  Exercise tolerance: good (4-7 METS)    ECG reviewed    (+) hypertension, dysrhythmias Atrial Fib, DVT, hyperlipidemia,     ROS comment: Sinus rhythm  Inferior infarct, old    Neuro/Psych  (+) psychiatric history Anxiety, Depression and Bipolar,      ROS Comment: XRT to brain  GI/Hepatic/Renal/Endo    (+)   diabetes mellitus, thyroid problem hypothyroidism    Musculoskeletal     (+) back pain, chronic pain,   Abdominal    Substance History   (+) alcohol use, drug use (methadone use last Wed)      Comment: Hx of percocet addiction, on suboxone and uses \"more\" than Rx   OB/GYN negative ob/gyn ROS         Other   arthritis,    history of cancer (metastatic to brain) active    ROS/Med Hx Other: HX COPD,LUNG CA MET BRAIN -RADIATION /CHEMO.RECENT ER VISIT CP/SOA. PT STATES ANXIETY ATTACK. D/C HOME AFTER RELIEF W/ STERIODS TX. EKG/TROPONINS NEG. ECHO 7/21 EF 59%, MILD TO MOD MITRAL REGURG, STRESS 7//21 EF 87% LOW RISK STUDY METS<4.NO CP +SOA. ELM                 Anesthesia Plan    ASA 4     general with block and ERAS Protocol     (Patient understands anesthesia not responsible for dental damage. Regional anesthesia options discussed with patient. Pt accepts regional block.)  intravenous induction     Anesthetic plan, risks, benefits, and alternatives have been provided, discussed and informed consent has been obtained with: patient.  Pre-procedure education provided  Use of blood products discussed with patient .   Plan discussed with CRNA.        CODE STATUS:       "

## 2022-11-30 ENCOUNTER — TELEPHONE (OUTPATIENT)
Dept: ORTHOPEDIC SURGERY | Facility: CLINIC | Age: 62
End: 2022-11-30

## 2023-03-28 RX ORDER — ALBUTEROL SULFATE 90 UG/1
AEROSOL, METERED RESPIRATORY (INHALATION)
Qty: 8.5 G | Refills: 10 | Status: SHIPPED | OUTPATIENT
Start: 2023-03-28

## 2024-10-09 ENCOUNTER — TELEPHONE (OUTPATIENT)
Dept: ORTHOPEDIC SURGERY | Facility: CLINIC | Age: 64
End: 2024-10-09

## 2024-10-09 NOTE — TELEPHONE ENCOUNTER
Caller: Katharina Torres    Relationship: Self    Best call back number:     Who is your current provider: DR MALDONADO    Is your current provider offboarding? NO     Who would you like your new provider to be: WHOEVER CAN HELP, PATIENT READY FOR SURGERY     What are your reasons for transferring care: PATIENT MOVED TO [facility]     Additional notes: PLEASE REVIEW AND GET IN CONTACT TO SCHEDULE

## 2025-03-12 ENCOUNTER — APPOINTMENT (OUTPATIENT)
Dept: CT IMAGING | Facility: HOSPITAL | Age: 65
DRG: 190 | End: 2025-03-12
Payer: MEDICARE

## 2025-03-12 ENCOUNTER — APPOINTMENT (OUTPATIENT)
Dept: GENERAL RADIOLOGY | Facility: HOSPITAL | Age: 65
DRG: 190 | End: 2025-03-12
Payer: MEDICARE

## 2025-03-12 ENCOUNTER — HOSPITAL ENCOUNTER (INPATIENT)
Facility: HOSPITAL | Age: 65
LOS: 2 days | Discharge: HOME | DRG: 190 | End: 2025-03-16
Attending: EMERGENCY MEDICINE | Admitting: INTERNAL MEDICINE
Payer: MEDICARE

## 2025-03-12 DIAGNOSIS — F17.200 SMOKER: ICD-10-CM

## 2025-03-12 DIAGNOSIS — J10.1 INFLUENZA A: ICD-10-CM

## 2025-03-12 DIAGNOSIS — J44.1 ACUTE EXACERBATION OF CHRONIC OBSTRUCTIVE PULMONARY DISEASE (COPD): Primary | ICD-10-CM

## 2025-03-12 LAB
ALBUMIN SERPL-MCNC: 4.3 G/DL (ref 3.5–5.2)
ALBUMIN/GLOB SERPL: 1.4 G/DL
ALP SERPL-CCNC: 89 U/L (ref 39–117)
ALT SERPL W P-5'-P-CCNC: 20 U/L (ref 1–33)
ANION GAP SERPL CALCULATED.3IONS-SCNC: 14.7 MMOL/L (ref 5–15)
APTT PPP: 27.7 SECONDS (ref 22.7–35.4)
ARTERIAL PATENCY WRIST A: POSITIVE
AST SERPL-CCNC: 26 U/L (ref 1–32)
ATMOSPHERIC PRESS: 743.7 MMHG
B PARAPERT DNA SPEC QL NAA+PROBE: NOT DETECTED
B PERT DNA SPEC QL NAA+PROBE: NOT DETECTED
BASE EXCESS BLDA CALC-SCNC: 3.2 MMOL/L (ref 0–2)
BASOPHILS # BLD AUTO: 0.01 10*3/MM3 (ref 0–0.2)
BASOPHILS NFR BLD AUTO: 0.1 % (ref 0–1.5)
BDY SITE: ABNORMAL
BILIRUB SERPL-MCNC: 0.5 MG/DL (ref 0–1.2)
BUN SERPL-MCNC: 6 MG/DL (ref 8–23)
BUN/CREAT SERPL: 9.7 (ref 7–25)
C PNEUM DNA NPH QL NAA+NON-PROBE: NOT DETECTED
CALCIUM SPEC-SCNC: 9 MG/DL (ref 8.6–10.5)
CHLORIDE SERPL-SCNC: 101 MMOL/L (ref 98–107)
CO2 SERPL-SCNC: 25.3 MMOL/L (ref 22–29)
CREAT SERPL-MCNC: 0.62 MG/DL (ref 0.57–1)
D DIMER PPP FEU-MCNC: 1.13 MCGFEU/ML (ref 0–0.62)
D-LACTATE SERPL-SCNC: 1.2 MMOL/L (ref 0.5–2)
DEPRECATED RDW RBC AUTO: 39.8 FL (ref 37–54)
DEVICE COMMENT: ABNORMAL
EGFRCR SERPLBLD CKD-EPI 2021: 100.8 ML/MIN/1.73
EOSINOPHIL # BLD AUTO: 0.02 10*3/MM3 (ref 0–0.4)
EOSINOPHIL NFR BLD AUTO: 0.3 % (ref 0.3–6.2)
ERYTHROCYTE [DISTWIDTH] IN BLOOD BY AUTOMATED COUNT: 12.9 % (ref 12.3–15.4)
FLUAV H1 2009 PAND RNA NPH QL NAA+PROBE: DETECTED
FLUBV RNA ISLT QL NAA+PROBE: NOT DETECTED
GLOBULIN UR ELPH-MCNC: 3 GM/DL
GLUCOSE SERPL-MCNC: 91 MG/DL (ref 65–99)
HADV DNA SPEC NAA+PROBE: NOT DETECTED
HCO3 BLDA-SCNC: 26.5 MMOL/L (ref 22–28)
HCOV 229E RNA SPEC QL NAA+PROBE: NOT DETECTED
HCOV HKU1 RNA SPEC QL NAA+PROBE: NOT DETECTED
HCOV NL63 RNA SPEC QL NAA+PROBE: NOT DETECTED
HCOV OC43 RNA SPEC QL NAA+PROBE: NOT DETECTED
HCT VFR BLD AUTO: 43.1 % (ref 34–46.6)
HEMODILUTION: NO
HGB BLD-MCNC: 14.4 G/DL (ref 12–15.9)
HMPV RNA NPH QL NAA+NON-PROBE: NOT DETECTED
HPIV1 RNA ISLT QL NAA+PROBE: NOT DETECTED
HPIV2 RNA SPEC QL NAA+PROBE: NOT DETECTED
HPIV3 RNA NPH QL NAA+PROBE: NOT DETECTED
HPIV4 P GENE NPH QL NAA+PROBE: NOT DETECTED
IMM GRANULOCYTES # BLD AUTO: 0.02 10*3/MM3 (ref 0–0.05)
IMM GRANULOCYTES NFR BLD AUTO: 0.3 % (ref 0–0.5)
INR PPP: 1.26 (ref 0.9–1.1)
LYMPHOCYTES # BLD AUTO: 1.83 10*3/MM3 (ref 0.7–3.1)
LYMPHOCYTES NFR BLD AUTO: 25.1 % (ref 19.6–45.3)
Lab: ABNORMAL
M PNEUMO IGG SER IA-ACNC: NOT DETECTED
MCH RBC QN AUTO: 28.3 PG (ref 26.6–33)
MCHC RBC AUTO-ENTMCNC: 33.4 G/DL (ref 31.5–35.7)
MCV RBC AUTO: 84.8 FL (ref 79–97)
MODALITY: ABNORMAL
MONOCYTES # BLD AUTO: 0.4 10*3/MM3 (ref 0.1–0.9)
MONOCYTES NFR BLD AUTO: 5.5 % (ref 5–12)
NEUTROPHILS NFR BLD AUTO: 5.01 10*3/MM3 (ref 1.7–7)
NEUTROPHILS NFR BLD AUTO: 68.7 % (ref 42.7–76)
NOTIFIED WHO: ABNORMAL
NRBC BLD AUTO-RTO: 0 /100 WBC (ref 0–0.2)
NT-PROBNP SERPL-MCNC: 60.1 PG/ML (ref 0–900)
PCO2 BLDA: 35.4 MM HG (ref 35–45)
PH BLDA: 7.48 PH UNITS (ref 7.35–7.45)
PLATELET # BLD AUTO: 172 10*3/MM3 (ref 140–450)
PMV BLD AUTO: 9.7 FL (ref 6–12)
PO2 BLDA: 54.9 MM HG (ref 80–100)
POTASSIUM SERPL-SCNC: 3.2 MMOL/L (ref 3.5–5.2)
PROT SERPL-MCNC: 7.3 G/DL (ref 6–8.5)
PROTHROMBIN TIME: 15.8 SECONDS (ref 11.7–14.2)
RBC # BLD AUTO: 5.08 10*6/MM3 (ref 3.77–5.28)
READ BACK: ABNORMAL
RHINOVIRUS RNA SPEC NAA+PROBE: NOT DETECTED
RSV RNA NPH QL NAA+NON-PROBE: NOT DETECTED
SAO2 % BLDCOA: 90.5 % (ref 92–98.5)
SARS-COV-2 RNA NPH QL NAA+NON-PROBE: NOT DETECTED
SODIUM SERPL-SCNC: 141 MMOL/L (ref 136–145)
TOTAL RATE: 16 BREATHS/MINUTE
WBC NRBC COR # BLD AUTO: 7.29 10*3/MM3 (ref 3.4–10.8)

## 2025-03-12 PROCEDURE — 87040 BLOOD CULTURE FOR BACTERIA: CPT | Performed by: EMERGENCY MEDICINE

## 2025-03-12 PROCEDURE — 99285 EMERGENCY DEPT VISIT HI MDM: CPT

## 2025-03-12 PROCEDURE — G0378 HOSPITAL OBSERVATION PER HR: HCPCS

## 2025-03-12 PROCEDURE — 83880 ASSAY OF NATRIURETIC PEPTIDE: CPT | Performed by: EMERGENCY MEDICINE

## 2025-03-12 PROCEDURE — 36415 COLL VENOUS BLD VENIPUNCTURE: CPT

## 2025-03-12 PROCEDURE — 94761 N-INVAS EAR/PLS OXIMETRY MLT: CPT

## 2025-03-12 PROCEDURE — 85379 FIBRIN DEGRADATION QUANT: CPT | Performed by: EMERGENCY MEDICINE

## 2025-03-12 PROCEDURE — 25010000002 ONDANSETRON PER 1 MG

## 2025-03-12 PROCEDURE — 85610 PROTHROMBIN TIME: CPT | Performed by: EMERGENCY MEDICINE

## 2025-03-12 PROCEDURE — 93010 ELECTROCARDIOGRAM REPORT: CPT | Performed by: INTERNAL MEDICINE

## 2025-03-12 PROCEDURE — 94799 UNLISTED PULMONARY SVC/PX: CPT

## 2025-03-12 PROCEDURE — 71275 CT ANGIOGRAPHY CHEST: CPT

## 2025-03-12 PROCEDURE — 0202U NFCT DS 22 TRGT SARS-COV-2: CPT | Performed by: EMERGENCY MEDICINE

## 2025-03-12 PROCEDURE — 83605 ASSAY OF LACTIC ACID: CPT | Performed by: EMERGENCY MEDICINE

## 2025-03-12 PROCEDURE — 93005 ELECTROCARDIOGRAM TRACING: CPT | Performed by: EMERGENCY MEDICINE

## 2025-03-12 PROCEDURE — 25510000001 IOPAMIDOL PER 1 ML: Performed by: EMERGENCY MEDICINE

## 2025-03-12 PROCEDURE — 71045 X-RAY EXAM CHEST 1 VIEW: CPT

## 2025-03-12 PROCEDURE — 25010000002 METHYLPREDNISOLONE PER 125 MG: Performed by: EMERGENCY MEDICINE

## 2025-03-12 PROCEDURE — 85730 THROMBOPLASTIN TIME PARTIAL: CPT | Performed by: EMERGENCY MEDICINE

## 2025-03-12 PROCEDURE — 36600 WITHDRAWAL OF ARTERIAL BLOOD: CPT

## 2025-03-12 PROCEDURE — 94640 AIRWAY INHALATION TREATMENT: CPT

## 2025-03-12 PROCEDURE — 80053 COMPREHEN METABOLIC PANEL: CPT | Performed by: EMERGENCY MEDICINE

## 2025-03-12 PROCEDURE — 94760 N-INVAS EAR/PLS OXIMETRY 1: CPT

## 2025-03-12 PROCEDURE — 82803 BLOOD GASES ANY COMBINATION: CPT

## 2025-03-12 PROCEDURE — 85025 COMPLETE CBC W/AUTO DIFF WBC: CPT | Performed by: EMERGENCY MEDICINE

## 2025-03-12 RX ORDER — ACETAMINOPHEN 650 MG/1
650 SUPPOSITORY RECTAL EVERY 4 HOURS PRN
Status: DISCONTINUED | OUTPATIENT
Start: 2025-03-12 | End: 2025-03-16 | Stop reason: HOSPADM

## 2025-03-12 RX ORDER — ONDANSETRON 2 MG/ML
4 INJECTION INTRAMUSCULAR; INTRAVENOUS EVERY 6 HOURS PRN
Status: DISCONTINUED | OUTPATIENT
Start: 2025-03-12 | End: 2025-03-16 | Stop reason: HOSPADM

## 2025-03-12 RX ORDER — CALCIUM CARBONATE 500 MG/1
2 TABLET, CHEWABLE ORAL 3 TIMES DAILY PRN
Status: DISCONTINUED | OUTPATIENT
Start: 2025-03-12 | End: 2025-03-16 | Stop reason: HOSPADM

## 2025-03-12 RX ORDER — POTASSIUM CHLORIDE 750 MG/1
10 TABLET, FILM COATED, EXTENDED RELEASE ORAL DAILY
Status: DISCONTINUED | OUTPATIENT
Start: 2025-03-13 | End: 2025-03-16 | Stop reason: HOSPADM

## 2025-03-12 RX ORDER — METHYLPREDNISOLONE SODIUM SUCCINATE 40 MG/ML
40 INJECTION, POWDER, LYOPHILIZED, FOR SOLUTION INTRAMUSCULAR; INTRAVENOUS EVERY 6 HOURS
Status: DISCONTINUED | OUTPATIENT
Start: 2025-03-12 | End: 2025-03-12

## 2025-03-12 RX ORDER — METHYLPREDNISOLONE SODIUM SUCCINATE 125 MG/2ML
125 INJECTION, POWDER, LYOPHILIZED, FOR SOLUTION INTRAMUSCULAR; INTRAVENOUS ONCE
Status: COMPLETED | OUTPATIENT
Start: 2025-03-12 | End: 2025-03-12

## 2025-03-12 RX ORDER — AMOXICILLIN 250 MG
2 CAPSULE ORAL 2 TIMES DAILY PRN
Status: DISCONTINUED | OUTPATIENT
Start: 2025-03-12 | End: 2025-03-16 | Stop reason: HOSPADM

## 2025-03-12 RX ORDER — SODIUM CHLORIDE 0.9 % (FLUSH) 0.9 %
10 SYRINGE (ML) INJECTION AS NEEDED
Status: DISCONTINUED | OUTPATIENT
Start: 2025-03-12 | End: 2025-03-16 | Stop reason: HOSPADM

## 2025-03-12 RX ORDER — BUPRENORPHINE HYDROCHLORIDE AND NALOXONE HYDROCHLORIDE DIHYDRATE 8; 2 MG/1; MG/1
1 TABLET SUBLINGUAL DAILY
Status: DISCONTINUED | OUTPATIENT
Start: 2025-03-13 | End: 2025-03-13 | Stop reason: SDUPTHER

## 2025-03-12 RX ORDER — BISACODYL 10 MG
10 SUPPOSITORY, RECTAL RECTAL DAILY PRN
Status: DISCONTINUED | OUTPATIENT
Start: 2025-03-12 | End: 2025-03-16 | Stop reason: HOSPADM

## 2025-03-12 RX ORDER — HYDROCODONE BITARTRATE AND HOMATROPINE METHYLBROMIDE ORAL SOLUTION 5; 1.5 MG/5ML; MG/5ML
5 LIQUID ORAL ONCE
Refills: 0 | Status: COMPLETED | OUTPATIENT
Start: 2025-03-12 | End: 2025-03-12

## 2025-03-12 RX ORDER — SODIUM CHLORIDE 0.9 % (FLUSH) 0.9 %
10 SYRINGE (ML) INJECTION EVERY 12 HOURS SCHEDULED
Status: DISCONTINUED | OUTPATIENT
Start: 2025-03-12 | End: 2025-03-16 | Stop reason: HOSPADM

## 2025-03-12 RX ORDER — POTASSIUM CHLORIDE 1500 MG/1
40 TABLET, EXTENDED RELEASE ORAL EVERY 4 HOURS
Status: COMPLETED | OUTPATIENT
Start: 2025-03-12 | End: 2025-03-13

## 2025-03-12 RX ORDER — ALBUTEROL SULFATE 0.83 MG/ML
2.5 SOLUTION RESPIRATORY (INHALATION) EVERY 6 HOURS PRN
Status: DISCONTINUED | OUTPATIENT
Start: 2025-03-12 | End: 2025-03-16 | Stop reason: HOSPADM

## 2025-03-12 RX ORDER — IOPAMIDOL 755 MG/ML
100 INJECTION, SOLUTION INTRAVASCULAR
Status: COMPLETED | OUTPATIENT
Start: 2025-03-12 | End: 2025-03-12

## 2025-03-12 RX ORDER — FUROSEMIDE 40 MG/1
40 TABLET ORAL DAILY
Status: DISCONTINUED | OUTPATIENT
Start: 2025-03-13 | End: 2025-03-16 | Stop reason: HOSPADM

## 2025-03-12 RX ORDER — ACETAMINOPHEN 160 MG/5ML
650 SOLUTION ORAL EVERY 4 HOURS PRN
Status: DISCONTINUED | OUTPATIENT
Start: 2025-03-12 | End: 2025-03-16 | Stop reason: HOSPADM

## 2025-03-12 RX ORDER — NICOTINE 21 MG/24HR
1 PATCH, TRANSDERMAL 24 HOURS TRANSDERMAL NIGHTLY
Status: DISCONTINUED | OUTPATIENT
Start: 2025-03-12 | End: 2025-03-16 | Stop reason: HOSPADM

## 2025-03-12 RX ORDER — ACETAMINOPHEN 325 MG/1
650 TABLET ORAL EVERY 4 HOURS PRN
Status: DISCONTINUED | OUTPATIENT
Start: 2025-03-12 | End: 2025-03-16 | Stop reason: HOSPADM

## 2025-03-12 RX ORDER — SODIUM CHLORIDE 9 MG/ML
40 INJECTION, SOLUTION INTRAVENOUS AS NEEDED
Status: DISCONTINUED | OUTPATIENT
Start: 2025-03-12 | End: 2025-03-16 | Stop reason: HOSPADM

## 2025-03-12 RX ORDER — POLYETHYLENE GLYCOL 3350 17 G/17G
17 POWDER, FOR SOLUTION ORAL DAILY PRN
Status: DISCONTINUED | OUTPATIENT
Start: 2025-03-12 | End: 2025-03-16 | Stop reason: HOSPADM

## 2025-03-12 RX ORDER — OSELTAMIVIR PHOSPHATE 75 MG/1
75 CAPSULE ORAL EVERY 12 HOURS SCHEDULED
Status: DISCONTINUED | OUTPATIENT
Start: 2025-03-12 | End: 2025-03-16 | Stop reason: HOSPADM

## 2025-03-12 RX ORDER — IPRATROPIUM BROMIDE AND ALBUTEROL SULFATE 2.5; .5 MG/3ML; MG/3ML
3 SOLUTION RESPIRATORY (INHALATION)
Status: DISCONTINUED | OUTPATIENT
Start: 2025-03-12 | End: 2025-03-16 | Stop reason: HOSPADM

## 2025-03-12 RX ORDER — IPRATROPIUM BROMIDE AND ALBUTEROL SULFATE 2.5; .5 MG/3ML; MG/3ML
3 SOLUTION RESPIRATORY (INHALATION) ONCE
Status: COMPLETED | OUTPATIENT
Start: 2025-03-12 | End: 2025-03-12

## 2025-03-12 RX ORDER — ONDANSETRON 4 MG/1
4 TABLET, ORALLY DISINTEGRATING ORAL EVERY 6 HOURS PRN
Status: DISCONTINUED | OUTPATIENT
Start: 2025-03-12 | End: 2025-03-16 | Stop reason: HOSPADM

## 2025-03-12 RX ORDER — OSELTAMIVIR PHOSPHATE 75 MG/1
75 CAPSULE ORAL ONCE
Status: COMPLETED | OUTPATIENT
Start: 2025-03-12 | End: 2025-03-12

## 2025-03-12 RX ORDER — AZITHROMYCIN 250 MG/1
500 TABLET, FILM COATED ORAL NIGHTLY
Status: COMPLETED | OUTPATIENT
Start: 2025-03-12 | End: 2025-03-14

## 2025-03-12 RX ORDER — BISACODYL 5 MG/1
5 TABLET, DELAYED RELEASE ORAL DAILY PRN
Status: DISCONTINUED | OUTPATIENT
Start: 2025-03-12 | End: 2025-03-16 | Stop reason: HOSPADM

## 2025-03-12 RX ORDER — GUAIFENESIN 600 MG/1
1200 TABLET, EXTENDED RELEASE ORAL EVERY 12 HOURS SCHEDULED
Status: DISCONTINUED | OUTPATIENT
Start: 2025-03-12 | End: 2025-03-16 | Stop reason: HOSPADM

## 2025-03-12 RX ORDER — NITROGLYCERIN 0.4 MG/1
0.4 TABLET SUBLINGUAL
Status: DISCONTINUED | OUTPATIENT
Start: 2025-03-12 | End: 2025-03-16 | Stop reason: HOSPADM

## 2025-03-12 RX ADMIN — POTASSIUM CHLORIDE 40 MEQ: 1500 TABLET, EXTENDED RELEASE ORAL at 22:21

## 2025-03-12 RX ADMIN — NICOTINE 1 PATCH: 14 PATCH TRANSDERMAL at 22:21

## 2025-03-12 RX ADMIN — IPRATROPIUM BROMIDE AND ALBUTEROL SULFATE 3 ML: .5; 3 SOLUTION RESPIRATORY (INHALATION) at 20:56

## 2025-03-12 RX ADMIN — IPRATROPIUM BROMIDE AND ALBUTEROL SULFATE 3 ML: .5; 3 SOLUTION RESPIRATORY (INHALATION) at 16:33

## 2025-03-12 RX ADMIN — METHYLPREDNISOLONE SODIUM SUCCINATE 125 MG: 125 INJECTION, POWDER, FOR SOLUTION INTRAMUSCULAR; INTRAVENOUS at 17:28

## 2025-03-12 RX ADMIN — GUAIFENESIN 1200 MG: 600 TABLET, MULTILAYER, EXTENDED RELEASE ORAL at 22:21

## 2025-03-12 RX ADMIN — Medication 10 ML: at 22:53

## 2025-03-12 RX ADMIN — AZITHROMYCIN 500 MG: 250 TABLET, FILM COATED ORAL at 22:21

## 2025-03-12 RX ADMIN — OSELTAMIVIR PHOSPHATE 75 MG: 75 CAPSULE ORAL at 22:21

## 2025-03-12 RX ADMIN — HYDROCODONE BITARTRATE AND HOMATROPINE METHYLBROMIDE ORAL SOLUTION 5 ML: 5; 1.5 LIQUID ORAL at 17:00

## 2025-03-12 RX ADMIN — OSELTAMIVIR PHOSPHATE 75 MG: 75 CAPSULE ORAL at 20:03

## 2025-03-12 RX ADMIN — ONDANSETRON 4 MG: 2 INJECTION, SOLUTION INTRAMUSCULAR; INTRAVENOUS at 22:53

## 2025-03-12 RX ADMIN — IOPAMIDOL 88 ML: 755 INJECTION, SOLUTION INTRAVENOUS at 19:07

## 2025-03-12 NOTE — ED TRIAGE NOTES
Pt from home via ems, called for SOA/cough that got worse today, dx'd with PNA last week at [facility], has completed antibiotics; flu exposure in the home

## 2025-03-12 NOTE — ED PROVIDER NOTES
EMERGENCY DEPARTMENT ENCOUNTER  Room Number:  32/32  PCP: Provider, No Known  Independent Historians: Patient and granddaughter      HPI:  Chief Complaint: Productive cough and dyspnea    A complete HPI/ROS/PMH/PSH/SH/FH are unobtainable due to: None    Chronic or social conditions impacting patient care (Social Determinants of Health): None      Context: Katharina Torres is a 64 y.o. female with a medical history of tobacco abuse, alcohol abuse, bipolar, hypothyroidism, hypertension and diabetes who presents to the ED c/o acute productive cough and dyspnea.  The patient reports that she has had this cough for 3 months and has been on at least 1 if not 2 rounds of antibiotics with no improvement.  She is still a daily smoker.  She reports green/brown sputum production but no hemoptysis.  No reported fevers in the last week or 2.  Patient reports sometimes she coughs so much that she vomits but otherwise has had no vomiting or diarrhea nor abdominal pain reported.  Patient does report a prior history of cancer and reports she is currently in remission though she follows with oncology for regular checkups.      Review of prior external notes (non-ED) -and- Review of prior external test results outside of this encounter:  Hospital discharge summary 10/27/2021 from [facility] reviewed: Patient noted to have a history of stage IV lung cancer undergoing active chemotherapy with Keytruda as well as polysubstance abuse including alcoholism and methamphetamine use.  Patient was admitted for stabilization from substance withdrawal.  ================================  Patient was seen at [facility] 3/3/2025 and apparently prescribed Augmentin      PAST MEDICAL HISTORY  Active Ambulatory Problems     Diagnosis Date Noted    Chronic narcotic use 10/23/2021    Hypothyroidism (acquired) 10/23/2021    Primary hypertension 10/23/2021    Positive urine drug screen 10/23/2021    Tobacco abuse 10/23/2021    Primary  osteoarthritis of left hip 09/09/2022    Primary osteoarthritis of left knee 09/09/2022     Resolved Ambulatory Problems     Diagnosis Date Noted    Alcohol withdrawal syndrome without complication 10/23/2021    Hypokalemia 10/23/2021    Hypophosphatemia 10/24/2021     Past Medical History:   Diagnosis Date    Addiction     Afib     Allergic     Anxiety     Arthritis     Bipolar 1 disorder     Cancer     COPD (chronic obstructive pulmonary disease)     Depression     Diabetes mellitus     DVT, bilateral lower limbs     Hyperlipidemia     Hypertension     Pneumonia     Thyroid disease          PAST SURGICAL HISTORY  Past Surgical History:   Procedure Laterality Date    CARPAL TUNNEL RELEASE      HEEL SPUR SURGERY Left     VENOUS ACCESS DEVICE (PORT) INSERTION      FOR CHEMO         FAMILY HISTORY  Family History   Problem Relation Age of Onset    Heart disease Mother     Diabetes Mother     Cancer Father     Stroke Father     Heart disease Father     Diabetes Father          SOCIAL HISTORY  Social History     Socioeconomic History    Marital status:    Tobacco Use    Smoking status: Every Day     Current packs/day: 1.50     Average packs/day: 1.5 packs/day for 38.4 years (57.6 ttl pk-yrs)     Types: Cigarettes     Start date: 10/23/1986    Smokeless tobacco: Never   Vaping Use    Vaping status: Never Used   Substance and Sexual Activity    Alcohol use: Not Currently    Drug use: Not Currently    Sexual activity: Not Currently     Partners: Male     Birth control/protection: None         ALLERGIES  Patient has no known allergies.      REVIEW OF SYSTEMS  Review of Systems  Included in HPI  All systems reviewed and negative except for those discussed in HPI.      PHYSICAL EXAM    I have reviewed the triage vital signs and nursing notes.    ED Triage Vitals [03/12/25 1522]   Temp Heart Rate Resp BP SpO2   98.3 °F (36.8 °C) 81 16 143/83 92 %      Temp src Heart Rate Source Patient Position BP Location FiO2 (%)    Oral -- -- -- --       Physical Exam    Physical Exam   Constitutional: No distress.  Nontoxic but frequently coughing.  HENT:  Head: Normocephalic and atraumatic.   Oropharynx: Mucous membranes are moist.   Eyes: . No scleral icterus. No conjunctival pallor.  Neck: Normal range of motion. Neck supple.   Cardiovascular: Pink warm and well perfused throughout.  Regular  Pulmonary/Chest: Able to speak in full sentences with no respiratory distress.  There is significantly diminished breath sounds bilaterally with some expiratory wheezing noted.  Abdominal: Soft. There is no tenderness. There is no rebound and no guarding.   Musculoskeletal: Moves all extremities equally.  Patient complains of diffuse lower extremity pain with any palpation of her legs bilaterally.  No pitting edema noted.  Neurological: Alert and oriented.  No acute focal deficit appreciated.  Skin: Skin is pink, warm, and dry.   Psychiatric: Mood and affect normal.   Nursing note and vitals reviewed.             LAB RESULTS  Recent Results (from the past 24 hours)   ECG 12 Lead Dyspnea    Collection Time: 03/12/25  4:39 PM   Result Value Ref Range    QT Interval 435 ms    QTC Interval 465 ms   Comprehensive Metabolic Panel    Collection Time: 03/12/25  4:54 PM    Specimen: Arm, Right; Blood   Result Value Ref Range    Glucose 91 65 - 99 mg/dL    BUN 6 (L) 8 - 23 mg/dL    Creatinine 0.62 0.57 - 1.00 mg/dL    Sodium 141 136 - 145 mmol/L    Potassium 3.2 (L) 3.5 - 5.2 mmol/L    Chloride 101 98 - 107 mmol/L    CO2 25.3 22.0 - 29.0 mmol/L    Calcium 9.0 8.6 - 10.5 mg/dL    Total Protein 7.3 6.0 - 8.5 g/dL    Albumin 4.3 3.5 - 5.2 g/dL    ALT (SGPT) 20 1 - 33 U/L    AST (SGOT) 26 1 - 32 U/L    Alkaline Phosphatase 89 39 - 117 U/L    Total Bilirubin 0.5 0.0 - 1.2 mg/dL    Globulin 3.0 gm/dL    A/G Ratio 1.4 g/dL    BUN/Creatinine Ratio 9.7 7.0 - 25.0    Anion Gap 14.7 5.0 - 15.0 mmol/L    eGFR 100.8 >60.0 mL/min/1.73   Protime-INR    Collection Time:  03/12/25  4:54 PM    Specimen: Arm, Right; Blood   Result Value Ref Range    Protime 15.8 (H) 11.7 - 14.2 Seconds    INR 1.26 (H) 0.90 - 1.10   aPTT    Collection Time: 03/12/25  4:54 PM    Specimen: Arm, Right; Blood   Result Value Ref Range    PTT 27.7 22.7 - 35.4 seconds   BNP    Collection Time: 03/12/25  4:54 PM    Specimen: Arm, Right; Blood   Result Value Ref Range    proBNP 60.1 0.0 - 900.0 pg/mL   Lactic Acid, Plasma    Collection Time: 03/12/25  4:54 PM    Specimen: Arm, Right; Blood   Result Value Ref Range    Lactate 1.2 0.5 - 2.0 mmol/L   D-dimer, Quantitative    Collection Time: 03/12/25  4:54 PM    Specimen: Arm, Right; Blood   Result Value Ref Range    D-Dimer, Quantitative 1.13 (H) 0.00 - 0.62 MCGFEU/mL   CBC Auto Differential    Collection Time: 03/12/25  4:54 PM    Specimen: Arm, Right; Blood   Result Value Ref Range    WBC 7.29 3.40 - 10.80 10*3/mm3    RBC 5.08 3.77 - 5.28 10*6/mm3    Hemoglobin 14.4 12.0 - 15.9 g/dL    Hematocrit 43.1 34.0 - 46.6 %    MCV 84.8 79.0 - 97.0 fL    MCH 28.3 26.6 - 33.0 pg    MCHC 33.4 31.5 - 35.7 g/dL    RDW 12.9 12.3 - 15.4 %    RDW-SD 39.8 37.0 - 54.0 fl    MPV 9.7 6.0 - 12.0 fL    Platelets 172 140 - 450 10*3/mm3    Neutrophil % 68.7 42.7 - 76.0 %    Lymphocyte % 25.1 19.6 - 45.3 %    Monocyte % 5.5 5.0 - 12.0 %    Eosinophil % 0.3 0.3 - 6.2 %    Basophil % 0.1 0.0 - 1.5 %    Immature Grans % 0.3 0.0 - 0.5 %    Neutrophils, Absolute 5.01 1.70 - 7.00 10*3/mm3    Lymphocytes, Absolute 1.83 0.70 - 3.10 10*3/mm3    Monocytes, Absolute 0.40 0.10 - 0.90 10*3/mm3    Eosinophils, Absolute 0.02 0.00 - 0.40 10*3/mm3    Basophils, Absolute 0.01 0.00 - 0.20 10*3/mm3    Immature Grans, Absolute 0.02 0.00 - 0.05 10*3/mm3    nRBC 0.0 0.0 - 0.2 /100 WBC   Respiratory Panel PCR w/COVID-19(SARS-CoV-2) WILLIAM/REJI/MOUNA/PAD/COR/GUILLERMO In-House, NP Swab in [facility], 2 HR TAT - Swab, Nasopharynx    Collection Time: 03/12/25  4:58 PM    Specimen: Nasopharynx; Swab   Result Value Ref Range     ADENOVIRUS, PCR Not Detected Not Detected    Coronavirus 229E Not Detected Not Detected    Coronavirus HKU1 Not Detected Not Detected    Coronavirus NL63 Not Detected Not Detected    Coronavirus OC43 Not Detected Not Detected    COVID19 Not Detected Not Detected - Ref. Range    Human Metapneumovirus Not Detected Not Detected    Human Rhinovirus/Enterovirus Not Detected Not Detected    Influenza A H1 2009 PCR Detected (A) Not Detected    Influenza B PCR Not Detected Not Detected    Parainfluenza Virus 1 Not Detected Not Detected    Parainfluenza Virus 2 Not Detected Not Detected    Parainfluenza Virus 3 Not Detected Not Detected    Parainfluenza Virus 4 Not Detected Not Detected    RSV, PCR Not Detected Not Detected    Bordetella pertussis pcr Not Detected Not Detected    Bordetella parapertussis PCR Not Detected Not Detected    Chlamydophila pneumoniae PCR Not Detected Not Detected    Mycoplasma pneumo by PCR Not Detected Not Detected         RADIOLOGY  CT Angiogram Chest Pulmonary Embolism  Result Date: 3/12/2025  CTA CHEST WITH IV CONTRAST  HISTORY: History of lung cancer, productive cough and dyspnea  COMPARISON: Chest CT 9/15/2022  TECHNIQUE: CT angiography was performed of the chest with axial images as well as coronal and sagittal reformatted MIP images provided following administration of IV contrast. 3-D surface rendered reformats were obtained of the pulmonary arteries and aorta. Radiation dose reduction techniques were utilized, including automated exposure control, and exposure modulation based on body size.  FINDINGS:  There is a region of somewhat patchy alveolar infiltrate in the posterior medial right lower lobe, and there is some associated bronchial wall thickening/bronchial narrowing. Somewhat similar though less prominent findings are seen in the right middle lobe as well.  There is no dense pulmonary consolidation, pleural effusion or pneumothorax.  The thoracic aorta is normal in caliber, and  there are coronary atherosclerotic vascular calcifications. There is no new or otherwise suspicious mediastinal or hilar adenopathy or mass, though there are chronic soft tissue changes in the right hilum with irregularity of the right mainstem and upper lobe bronchus, unchanged, presumably related to prior therapy.  Images of the upper abdomen show no acute abnormality; a mixed density partially calcified right adrenal nodule is redemonstrated.  There is no acute bony abnormality.  Bolus timing is good and there is no evidence of pulmonary embolism.       Pulmonary arteries are well-opacified, and there is no evidence of pulmonary embolism.  New areas of patchy alveolar type infiltrate in the posterior medial right lower lobe, and to a lesser degree in the right middle lobe, with associated bronchial thickening.    This report was finalized on 3/12/2025 7:27 PM by Dr. Immanuel Hunt M.D on Workstation: [id number]      XR Chest 1 View  Result Date: 3/12/2025  XR CHEST 1 VW-  HISTORY: Female who is 64 years-old, cough  TECHNIQUE: Frontal view of the chest  COMPARISON: 9/15/2022  FINDINGS: Right chest port appears stable. The heart size is borderline. Pulmonary vasculature is unremarkable. No focal pulmonary consolidation, pleural effusion, or pneumothorax. No acute osseous process.      No focal pulmonary consolidation. Follow-up as clinical indications persist.  This report was finalized on 3/12/2025 4:48 PM by Dr. Jose Luis Gregorio M.D on Workstation: [id number]          MEDICATIONS GIVEN IN ER  Medications   sodium chloride 0.9 % flush 10 mL (has no administration in time range)   methylPREDNISolone sodium succinate (SOLU-Medrol) injection 125 mg (125 mg Intravenous Given 3/12/25 1728)   ipratropium-albuterol (DUO-NEB) nebulizer solution 3 mL (3 mL Nebulization Given 3/12/25 1633)   HYDROcodone Bit-Homatrop MBr (HYCODAN) 5-1.5 MG/5ML solution 5 mL (5 mL Oral Given 3/12/25 1700)   iopamidol (ISOVUE-370) 76 %  injection 100 mL (88 mL Intravenous Given 3/12/25 1907)   oseltamivir (TAMIFLU) capsule 75 mg (75 mg Oral Given 3/12/25 2003)         ORDERS PLACED DURING THIS VISIT:  Orders Placed This Encounter   Procedures    Respiratory Panel PCR w/COVID-19(SARS-CoV-2) WILLIAM/REJI/MOUNA/PAD/COR/GUILLERMO In-House, NP Swab in UTM/VTM, 2 HR TAT - Swab, Nasopharynx    Blood Culture - Blood,    Blood Culture - Blood,    XR Chest 1 View    CT Angiogram Chest Pulmonary Embolism    Comprehensive Metabolic Panel    Protime-INR    aPTT    BNP    Lactic Acid, Plasma    D-dimer, Quantitative    CBC Auto Differential    Monitor Blood Pressure    Continuous Pulse Oximetry    ECG 12 Lead Dyspnea    Insert Peripheral IV    CBC & Differential         OUTPATIENT MEDICATION MANAGEMENT:  Current Facility-Administered Medications Ordered in Epic   Medication Dose Route Frequency Provider Last Rate Last Admin    sodium chloride 0.9 % flush 10 mL  10 mL Intravenous PRN Manuel Sabillon MD         Current Outpatient Medications Ordered in Epic   Medication Sig Dispense Refill    albuterol (ACCUNEB) 1.25 MG/3ML nebulizer solution Take 3 mL by nebulization Every 6 (Six) Hours As Needed for Wheezing. 360 mL 0    albuterol sulfate  (90 Base) MCG/ACT inhaler INHALE 2 PUFFS BY MOUTH EVERY 6 HOURS AS NEEDED FOR SHORTNESS OF AIR 8.5 g 10    ALPRAZolam (XANAX) 1 MG tablet Take 1 mg by mouth 3 (Three) Times a Day.      amLODIPine (NORVASC) 5 MG tablet Take 1 tablet by mouth Daily. 90 tablet 1    atorvastatin (LIPITOR) 40 MG tablet Take 1 tablet by mouth Daily. 90 tablet 1    buprenorphine-naloxone (SUBOXONE) 8-2 MG per SL tablet Place 1 tablet under the tongue Daily. Instructed to stay on per Dr. Haynes and Dr. Shaw      citalopram (CeleXA) 20 MG tablet Take 20 mg by mouth Daily.      docusate sodium (COLACE) 100 MG capsule Take 100 mg by mouth 2 (Two) Times a Day As Needed for Constipation.      furosemide (LASIX) 40 MG tablet Take 2 tablets by mouth 2 (Two)  Times a Day. 180 tablet 1    hydrOXYzine (ATARAX) 25 MG tablet Take 25 mg by mouth 3 (Three) Times a Day As Needed for Itching.      levothyroxine (SYNTHROID, LEVOTHROID) 175 MCG tablet Take 150 mcg by mouth Daily.      lisinopril (PRINIVIL,ZESTRIL) 20 MG tablet Take 1 tablet by mouth Daily. 90 tablet 1    metFORMIN (GLUCOPHAGE) 500 MG tablet Take 1 tablet by mouth 2 (Two) Times a Day With Meals. 180 tablet 1    ondansetron (ZOFRAN) 4 MG tablet Take 4 mg by mouth Every 8 (Eight) Hours As Needed for Nausea or Vomiting.      potassium chloride 10 MEQ CR tablet Take 1 tablet by mouth Daily. 90 tablet 1    predniSONE (DELTASONE) 20 MG tablet Take 3 tablets by mouth Daily. (Patient taking differently: Take 60 mg by mouth Daily. PT HAS NOT STARTED TAKING) 15 tablet 0    traZODone (DESYREL) 150 MG tablet Take 150 mg by mouth Every Night.           PROCEDURES  Procedures            PROGRESS, DATA ANALYSIS, CONSULTS, AND MEDICAL DECISION MAKING  All labs have been independently interpreted by me.  All radiology studies have been reviewed by me. All EKGs have been independently viewed and interpreted by me.  Discussion below represents my analysis of pertinent findings related to patient's condition, differential diagnosis, treatment plan and final disposition.    Differential diagnosis:   My differential diagnosis for cough includes but is not limited to:  Upper respiratory infection, bronchitis, pneumonia, COPD exacerbation, cough variant asthma, cardiac asthma, coronary artery disease, congestive heart failure, bacterial, viral or lung infections, lung cancer, aspiration pneumonitis, aspiration of foreign body and Covid -19        Clinical Scores:                  ED Course as of 03/12/25 2010   Wed Mar 12, 2025   1635 RADIOLOGY      Study: Single view chest  Findings: Port present.  Hazy area right lower lateral lung field.  I independently viewed and interpreted these images contemporaneously with treatment.    [RS]    1642 EKG           EKG time: 1639  Rhythm/Rate: Sinus, 70  P waves and GA: SALLY within normal limits  QRS, axis: Narrow complex  ST and T waves: No STEMI; QTc within normal limits    Interpreted Contemporaneously by me, independently viewed  Comparison: 9/15/2022   [RS]   1821 D-Dimer, Quant(!): 1.13 [RS]   1822 Glucose: 91 [RS]   1822 BUN(!): 6 [RS]   1822 Creatinine: 0.62 [RS]   1822 Sodium: 141 [RS]   1822 Potassium(!): 3.2 [RS]   1822 INR(!): 1.26 [RS]   1822 Lactate: 1.2 [RS]   1822 PTT: 27.7 [RS]   1822 proBNP: 60.1 [RS]   1822 WBC: 7.29 [RS]   1822 Hemoglobin: 14.4 [RS]   1824 Reviewed all findings with patient.  She is feeling a bit better after Hycodan and breathing treatment.  Recommend CTA of the chest.  Patient agreeable. [RS]   1932 Influenza A H1 2009 PCR(!): Detected [RS]   1957 Discussed all findings with patient.  She was staying overnight tonight.  We will consult with ED observation unit for admission.  ABG ordered. [RS]   2000 CONSULT        Provider: ANDRES Etienne    Discussion: Reviewed patient history, ED presentation and evaluation.  She will come see the patient in the ED before she accept the patient to observation unit.  I think this is reasonable.           [RS]   2003 Moved the pulse oximetry probe to her forehead she is 94% on room air. [RS]   2010 Patient accepted for admission to the observation unit. [RS]      ED Course User Index  [RS] Manuel Sabillon MD         Prescription drug monitoring program review:     AS OF 20:10 EDT VITALS:    BP - 139/93  HR - 84  TEMP - 98.3 °F (36.8 °C) (Oral)  O2 SATS - 90%    COMPLEXITY OF CARE  The patient requires admission.      DIAGNOSIS  Final diagnoses:   Acute exacerbation of chronic obstructive pulmonary disease (COPD)   Influenza A   Smoker         DISPOSITION  ED Disposition       ED Disposition   Decision to Admit    Condition   --    Comment   --                  ADMISSION    Discussed treatment plan and reason for admission with  pt/family and admitting physician.  Pt/family voiced understanding of the plan for admission for further testing/treatment as needed.       Please note that portions of this document were completed with a voice recognition program.    Note Disclaimer: At [facility], we believe that sharing information builds trust and better relationships. You are receiving this note because you recently visited [facility]. It is possible you will see health information before a provider has talked with you about it. This kind of information can be easy to misunderstand. To help you fully understand what it means for your health, we urge you to discuss this note with your provider.         Manuel Sabillon MD  03/12/25 2010

## 2025-03-13 ENCOUNTER — APPOINTMENT (OUTPATIENT)
Dept: CARDIOLOGY | Facility: HOSPITAL | Age: 65
DRG: 190 | End: 2025-03-13
Payer: MEDICARE

## 2025-03-13 LAB
ANION GAP SERPL CALCULATED.3IONS-SCNC: 12.3 MMOL/L (ref 5–15)
BH CV LOWER VASCULAR LEFT COMMON FEMORAL AUGMENT: NORMAL
BH CV LOWER VASCULAR LEFT COMMON FEMORAL COMPETENT: NORMAL
BH CV LOWER VASCULAR LEFT COMMON FEMORAL COMPRESS: NORMAL
BH CV LOWER VASCULAR LEFT COMMON FEMORAL PHASIC: NORMAL
BH CV LOWER VASCULAR LEFT COMMON FEMORAL SPONT: NORMAL
BH CV LOWER VASCULAR LEFT DISTAL FEMORAL COMPRESS: NORMAL
BH CV LOWER VASCULAR LEFT GASTRONEMIUS COMPRESS: NORMAL
BH CV LOWER VASCULAR LEFT GREATER SAPH AK COMPRESS: NORMAL
BH CV LOWER VASCULAR LEFT GREATER SAPH BK COMPRESS: NORMAL
BH CV LOWER VASCULAR LEFT LESSER SAPH COMPRESS: NORMAL
BH CV LOWER VASCULAR LEFT MID FEMORAL AUGMENT: NORMAL
BH CV LOWER VASCULAR LEFT MID FEMORAL COMPETENT: NORMAL
BH CV LOWER VASCULAR LEFT MID FEMORAL COMPRESS: NORMAL
BH CV LOWER VASCULAR LEFT MID FEMORAL PHASIC: NORMAL
BH CV LOWER VASCULAR LEFT MID FEMORAL SPONT: NORMAL
BH CV LOWER VASCULAR LEFT PERONEAL COMPRESS: NORMAL
BH CV LOWER VASCULAR LEFT POPLITEAL AUGMENT: NORMAL
BH CV LOWER VASCULAR LEFT POPLITEAL COMPETENT: NORMAL
BH CV LOWER VASCULAR LEFT POPLITEAL COMPRESS: NORMAL
BH CV LOWER VASCULAR LEFT POPLITEAL PHASIC: NORMAL
BH CV LOWER VASCULAR LEFT POPLITEAL SPONT: NORMAL
BH CV LOWER VASCULAR LEFT POSTERIOR TIBIAL COMPRESS: NORMAL
BH CV LOWER VASCULAR LEFT PROFUNDA FEMORAL COMPRESS: NORMAL
BH CV LOWER VASCULAR LEFT PROXIMAL FEMORAL COMPRESS: NORMAL
BH CV LOWER VASCULAR LEFT SAPHENOFEMORAL JUNCTION COMPRESS: NORMAL
BH CV LOWER VASCULAR RIGHT COMMON FEMORAL AUGMENT: NORMAL
BH CV LOWER VASCULAR RIGHT COMMON FEMORAL COMPETENT: NORMAL
BH CV LOWER VASCULAR RIGHT COMMON FEMORAL COMPRESS: NORMAL
BH CV LOWER VASCULAR RIGHT COMMON FEMORAL PHASIC: NORMAL
BH CV LOWER VASCULAR RIGHT COMMON FEMORAL SPONT: NORMAL
BH CV LOWER VASCULAR RIGHT DISTAL FEMORAL COMPRESS: NORMAL
BH CV LOWER VASCULAR RIGHT GASTRONEMIUS COMPRESS: NORMAL
BH CV LOWER VASCULAR RIGHT GREATER SAPH AK COMPRESS: NORMAL
BH CV LOWER VASCULAR RIGHT GREATER SAPH BK COMPRESS: NORMAL
BH CV LOWER VASCULAR RIGHT LESSER SAPH COMPRESS: NORMAL
BH CV LOWER VASCULAR RIGHT MID FEMORAL AUGMENT: NORMAL
BH CV LOWER VASCULAR RIGHT MID FEMORAL COMPETENT: NORMAL
BH CV LOWER VASCULAR RIGHT MID FEMORAL COMPRESS: NORMAL
BH CV LOWER VASCULAR RIGHT MID FEMORAL PHASIC: NORMAL
BH CV LOWER VASCULAR RIGHT MID FEMORAL SPONT: NORMAL
BH CV LOWER VASCULAR RIGHT PERONEAL COMPRESS: NORMAL
BH CV LOWER VASCULAR RIGHT POPLITEAL AUGMENT: NORMAL
BH CV LOWER VASCULAR RIGHT POPLITEAL COMPETENT: NORMAL
BH CV LOWER VASCULAR RIGHT POPLITEAL COMPRESS: NORMAL
BH CV LOWER VASCULAR RIGHT POPLITEAL PHASIC: NORMAL
BH CV LOWER VASCULAR RIGHT POPLITEAL SPONT: NORMAL
BH CV LOWER VASCULAR RIGHT POSTERIOR TIBIAL COMPRESS: NORMAL
BH CV LOWER VASCULAR RIGHT PROFUNDA FEMORAL COMPRESS: NORMAL
BH CV LOWER VASCULAR RIGHT PROXIMAL FEMORAL COMPRESS: NORMAL
BH CV LOWER VASCULAR RIGHT SAPHENOFEMORAL JUNCTION COMPRESS: NORMAL
BH CV VAS PRELIMINARY FINDINGS SCRIPTING: 1
BUN SERPL-MCNC: 5 MG/DL (ref 8–23)
BUN/CREAT SERPL: 11.1 (ref 7–25)
CALCIUM SPEC-SCNC: 8.8 MG/DL (ref 8.6–10.5)
CHLORIDE SERPL-SCNC: 105 MMOL/L (ref 98–107)
CO2 SERPL-SCNC: 24.7 MMOL/L (ref 22–29)
CREAT SERPL-MCNC: 0.45 MG/DL (ref 0.57–1)
DEPRECATED RDW RBC AUTO: 42 FL (ref 37–54)
EGFRCR SERPLBLD CKD-EPI 2021: 108.9 ML/MIN/1.73
ERYTHROCYTE [DISTWIDTH] IN BLOOD BY AUTOMATED COUNT: 13.1 % (ref 12.3–15.4)
GLUCOSE SERPL-MCNC: 123 MG/DL (ref 65–99)
HCT VFR BLD AUTO: 44.8 % (ref 34–46.6)
HGB BLD-MCNC: 14.1 G/DL (ref 12–15.9)
MCH RBC QN AUTO: 27.4 PG (ref 26.6–33)
MCHC RBC AUTO-ENTMCNC: 31.5 G/DL (ref 31.5–35.7)
MCV RBC AUTO: 87.2 FL (ref 79–97)
PLATELET # BLD AUTO: 182 10*3/MM3 (ref 140–450)
PMV BLD AUTO: 9.9 FL (ref 6–12)
POTASSIUM SERPL-SCNC: 4.2 MMOL/L (ref 3.5–5.2)
QT INTERVAL: 435 MS
QTC INTERVAL: 465 MS
RBC # BLD AUTO: 5.14 10*6/MM3 (ref 3.77–5.28)
SODIUM SERPL-SCNC: 142 MMOL/L (ref 136–145)
WBC NRBC COR # BLD AUTO: 8.52 10*3/MM3 (ref 3.4–10.8)

## 2025-03-13 PROCEDURE — 93970 EXTREMITY STUDY: CPT

## 2025-03-13 PROCEDURE — 94760 N-INVAS EAR/PLS OXIMETRY 1: CPT

## 2025-03-13 PROCEDURE — 94799 UNLISTED PULMONARY SVC/PX: CPT

## 2025-03-13 PROCEDURE — 93970 EXTREMITY STUDY: CPT | Performed by: SURGERY

## 2025-03-13 PROCEDURE — 80048 BASIC METABOLIC PNL TOTAL CA: CPT

## 2025-03-13 PROCEDURE — G0378 HOSPITAL OBSERVATION PER HR: HCPCS

## 2025-03-13 PROCEDURE — 94761 N-INVAS EAR/PLS OXIMETRY MLT: CPT

## 2025-03-13 PROCEDURE — 85027 COMPLETE CBC AUTOMATED: CPT

## 2025-03-13 PROCEDURE — 25010000002 ONDANSETRON PER 1 MG

## 2025-03-13 PROCEDURE — 94664 DEMO&/EVAL PT USE INHALER: CPT

## 2025-03-13 RX ORDER — CITALOPRAM HYDROBROMIDE 20 MG/1
20 TABLET ORAL DAILY
Status: DISCONTINUED | OUTPATIENT
Start: 2025-03-13 | End: 2025-03-16 | Stop reason: HOSPADM

## 2025-03-13 RX ORDER — BUPRENORPHINE HYDROCHLORIDE AND NALOXONE HYDROCHLORIDE DIHYDRATE 8; 2 MG/1; MG/1
1 TABLET SUBLINGUAL ONCE
Status: COMPLETED | OUTPATIENT
Start: 2025-03-13 | End: 2025-03-13

## 2025-03-13 RX ORDER — BUPRENORPHINE HYDROCHLORIDE AND NALOXONE HYDROCHLORIDE DIHYDRATE 8; 2 MG/1; MG/1
2 TABLET SUBLINGUAL DAILY
Status: DISCONTINUED | OUTPATIENT
Start: 2025-03-14 | End: 2025-03-16 | Stop reason: HOSPADM

## 2025-03-13 RX ORDER — METHOCARBAMOL 750 MG/1
750 TABLET, FILM COATED ORAL ONCE
Status: COMPLETED | OUTPATIENT
Start: 2025-03-13 | End: 2025-03-13

## 2025-03-13 RX ORDER — ATORVASTATIN CALCIUM 20 MG/1
20 TABLET, FILM COATED ORAL ONCE
Status: COMPLETED | OUTPATIENT
Start: 2025-03-13 | End: 2025-03-13

## 2025-03-13 RX ORDER — BUDESONIDE AND FORMOTEROL FUMARATE DIHYDRATE 160; 4.5 UG/1; UG/1
2 AEROSOL RESPIRATORY (INHALATION)
Status: DISCONTINUED | OUTPATIENT
Start: 2025-03-13 | End: 2025-03-16 | Stop reason: HOSPADM

## 2025-03-13 RX ADMIN — ONDANSETRON 4 MG: 2 INJECTION, SOLUTION INTRAMUSCULAR; INTRAVENOUS at 20:23

## 2025-03-13 RX ADMIN — OSELTAMIVIR PHOSPHATE 75 MG: 75 CAPSULE ORAL at 20:23

## 2025-03-13 RX ADMIN — BUDESONIDE AND FORMOTEROL FUMARATE DIHYDRATE 2 PUFF: 160; 4.5 AEROSOL RESPIRATORY (INHALATION) at 19:33

## 2025-03-13 RX ADMIN — GUAIFENESIN 1200 MG: 600 TABLET, MULTILAYER, EXTENDED RELEASE ORAL at 09:34

## 2025-03-13 RX ADMIN — POTASSIUM CHLORIDE 40 MEQ: 1500 TABLET, EXTENDED RELEASE ORAL at 01:20

## 2025-03-13 RX ADMIN — IPRATROPIUM BROMIDE AND ALBUTEROL SULFATE 3 ML: .5; 3 SOLUTION RESPIRATORY (INHALATION) at 10:39

## 2025-03-13 RX ADMIN — ACETAMINOPHEN 325MG 650 MG: 325 TABLET ORAL at 12:30

## 2025-03-13 RX ADMIN — IPRATROPIUM BROMIDE AND ALBUTEROL SULFATE 3 ML: .5; 3 SOLUTION RESPIRATORY (INHALATION) at 19:33

## 2025-03-13 RX ADMIN — NICOTINE 1 PATCH: 14 PATCH TRANSDERMAL at 20:22

## 2025-03-13 RX ADMIN — BUPRENORPHINE HYDROCHLORIDE AND NALOXONE HYDROCHLORIDE DIHYDRATE 1 TABLET: 8; 2 TABLET SUBLINGUAL at 09:32

## 2025-03-13 RX ADMIN — CITALOPRAM HYDROBROMIDE 20 MG: 20 TABLET ORAL at 10:28

## 2025-03-13 RX ADMIN — Medication 10 ML: at 21:08

## 2025-03-13 RX ADMIN — OSELTAMIVIR PHOSPHATE 75 MG: 75 CAPSULE ORAL at 09:34

## 2025-03-13 RX ADMIN — POTASSIUM CHLORIDE 10 MEQ: 750 TABLET, EXTENDED RELEASE ORAL at 09:34

## 2025-03-13 RX ADMIN — FUROSEMIDE 40 MG: 40 TABLET ORAL at 09:34

## 2025-03-13 RX ADMIN — AZITHROMYCIN 500 MG: 250 TABLET, FILM COATED ORAL at 20:22

## 2025-03-13 RX ADMIN — Medication 10 ML: at 09:34

## 2025-03-13 RX ADMIN — LEVOTHYROXINE SODIUM 175 MCG: 0.03 TABLET ORAL at 10:27

## 2025-03-13 RX ADMIN — ONDANSETRON 4 MG: 2 INJECTION, SOLUTION INTRAMUSCULAR; INTRAVENOUS at 09:34

## 2025-03-13 RX ADMIN — NICOTINE POLACRILEX 2 MG: 2 GUM, CHEWING BUCCAL at 20:22

## 2025-03-13 RX ADMIN — IPRATROPIUM BROMIDE AND ALBUTEROL SULFATE 3 ML: .5; 3 SOLUTION RESPIRATORY (INHALATION) at 14:21

## 2025-03-13 RX ADMIN — ACETAMINOPHEN 325MG 650 MG: 325 TABLET ORAL at 22:50

## 2025-03-13 RX ADMIN — IPRATROPIUM BROMIDE AND ALBUTEROL SULFATE 3 ML: .5; 3 SOLUTION RESPIRATORY (INHALATION) at 07:14

## 2025-03-13 RX ADMIN — GUAIFENESIN 1200 MG: 600 TABLET, MULTILAYER, EXTENDED RELEASE ORAL at 20:22

## 2025-03-13 RX ADMIN — METHOCARBAMOL TABLETS 750 MG: 750 TABLET, COATED ORAL at 10:28

## 2025-03-13 RX ADMIN — ATORVASTATIN CALCIUM 20 MG: 20 TABLET, FILM COATED ORAL at 10:28

## 2025-03-13 RX ADMIN — BUPRENORPHINE HYDROCHLORIDE AND NALOXONE HYDROCHLORIDE DIHYDRATE 1 TABLET: 8; 2 TABLET SUBLINGUAL at 10:28

## 2025-03-13 NOTE — PLAN OF CARE
Goal Outcome Evaluation:         Complaints of nausea and overall body aches throughout the night. Patient had to go on 2 L via NC due to O2 sats dropping. NSR, A+Ox4, stand by assist with straight cane, VSS. Special needs daughter at bedside. Pulmonology consult in the morning. Droplet precautions for Flu.

## 2025-03-13 NOTE — NURSING NOTE
Nursing report ED to floor  Katharina Torres  64 y.o.  female    HPI :  HPI  Stated Reason for Visit: SOA  History Obtained From: EMS, patient    Chief Complaint  Chief Complaint   Patient presents with    Cough       Admitting doctor:   Nini Gonzales MD    Admitting diagnosis:   The primary encounter diagnosis was Acute exacerbation of chronic obstructive pulmonary disease (COPD). Diagnoses of Influenza A and Smoker were also pertinent to this visit.    Code status:   Current Code Status       Date Active Code Status Order ID Comments User Context       3/12/2025 2013 CPR (Attempt to Resuscitate) [id number]  Bette Denise, [facility]        Question Answer    Code Status (Patient has no pulse and is not breathing) CPR (Attempt to Resuscitate)    Medical Interventions (Patient has pulse or is breathing) Full Support                    Allergies:   Patient has no known allergies.    Isolation:   Droplet    Intake and Output  No intake or output data in the 24 hours ending 03/12/25 2057    Weight:       03/12/25  1522   Weight: 83.9 kg (185 lb)       Most recent vitals:   Vitals:    03/12/25 1933 03/12/25 1939 03/12/25 1949 03/12/25 1954   BP:       Pulse: 78 78 79 84   Resp:       Temp:       TempSrc:       SpO2: 91% 90% 90% 90%   Weight:       Height:           Active LDAs/IV Access:   Lines, Drains & Airways       Active LDAs       Name Placement date Placement time Site Days    Peripheral IV 03/12/25 1728 Anterior;Left;Upper Arm 03/12/25  1728  Arm  less than 1                    Labs (abnormal labs have a star):   Labs Reviewed   RESPIRATORY PANEL PCR W/ COVID-19 (SARS-COV-2), NP SWAB IN UTM/VTP, 2 HR TAT - Abnormal; Notable for the following components:       Result Value    Influenza A H1 2009 PCR Detected (*)     All other components within normal limits    Narrative:     In the setting of a positive respiratory panel with a viral infection PLUS a negative procalcitonin without other underlying concern for bacterial  infection, consider observing off antibiotics or discontinuation of antibiotics and continue supportive care. If the respiratory panel is positive for atypical bacterial infection (Bordetella pertussis, Chlamydophila pneumoniae, or Mycoplasma pneumoniae), consider antibiotic de-escalation to target atypical bacterial infection.   COMPREHENSIVE METABOLIC PANEL - Abnormal; Notable for the following components:    BUN 6 (*)     Potassium 3.2 (*)     All other components within normal limits    Narrative:     GFR Categories in Chronic Kidney Disease (CKD)      GFR Category          GFR (mL/min/1.73)    Interpretation  G1                     90 or greater         Normal or high (1)  G2                      60-89                Mild decrease (1)  G3a                   45-59                Mild to moderate decrease  G3b                   30-44                Moderate to severe decrease  G4                    15-29                Severe decrease  G5                    14 or less           Kidney failure          (1)In the absence of evidence of kidney disease, neither GFR category G1 or G2 fulfill the criteria for CKD.    eGFR calculation 2021 CKD-EPI creatinine equation, which does not include race as a factor   PROTIME-INR - Abnormal; Notable for the following components:    Protime 15.8 (*)     INR 1.26 (*)     All other components within normal limits   D-DIMER, QUANTITATIVE - Abnormal; Notable for the following components:    D-Dimer, Quantitative 1.13 (*)     All other components within normal limits    Narrative:     According to the assay 's published package insert, a normal (<0.50 MCGFEU/mL) D-dimer result in conjunction with a non-high clinical probability assessment, excludes deep vein thrombosis (DVT) and pulmonary embolism (PE) with high sensitivity.    D-dimer values increase with age and this can make VTE exclusion of an older population difficult. To address this, the [employer] College of  "Physicians, based on best available evidence and recent guidelines, recommends that clinicians use age-adjusted D-dimer thresholds in patients greater than 50 years of age with: a) a low probability of PE who do not meet all Pulmonary Embolism Rule Out Criteria, or b) in those with intermediate probability of PE.   The formula for an age-adjusted D-dimer cut-off is \"age/100\".  For example, a 60 year old patient would have an age-adjusted cut-off of 0.60 MCGFEU/mL and an 80 year old 0.80 MCGFEU/mL.   BLOOD GAS, ARTERIAL - Abnormal; Notable for the following components:    pH, Arterial 7.483 (*)     pO2, Arterial 54.9 (*)     Base Excess, Arterial 3.2 (*)     O2 Saturation, Arterial 90.5 (*)     All other components within normal limits   APTT - Normal   BNP (IN-HOUSE) - Normal    Narrative:     This assay is used as an aid in the diagnosis of individuals suspected of having heart failure. It can be used as an aid in the diagnosis of acute decompensated heart failure (ADHF) in patients presenting with signs and symptoms of ADHF to the emergency department (ED). In addition, NT-proBNP of <300 pg/mL indicates ADHF is not likely.    Age Range Result Interpretation  NT-proBNP Concentration (pg/mL:      <50             Positive            >450                   Gray                 300-450                    Negative             <300    50-75           Positive            >900                  Gray                300-900                  Negative            <300      >75             Positive            >1800                  Gray                300-1800                  Negative            <300   LACTIC ACID, PLASMA - Normal   CBC WITH AUTO DIFFERENTIAL - Normal   BLOOD CULTURE   BLOOD CULTURE   CBC AND DIFFERENTIAL    Narrative:     The following orders were created for panel order CBC & Differential.  Procedure                               Abnormality         Status                     ---------                          "      -----------         ------                     CBC Auto Differential[[id number]]        Normal              Final result                 Please view results for these tests on the individual orders.       EKG:   ECG 12 Lead Dyspnea   Preliminary Result   HEART RATE=69  bpm   RR Dqkcggsh=377  ms   MA Aqmfvtbq=555  ms   P Horizontal Axis=2  deg   P Front Axis=56  deg   QRSD Interval=84  ms   QT Xknjywor=190  ms   FVlL=165  ms   QRS Axis=-26  deg   T Wave Axis=32  deg   - BORDERLINE ECG -   Sinus rhythm   Probable left atrial enlargement   Borderline left axis deviation   Date and Time of Study:2025-03-12 16:39:56          Meds given in ED:   Medications   sodium chloride 0.9 % flush 10 mL (has no administration in time range)   ipratropium-albuterol (DUO-NEB) nebulizer solution 3 mL (3 mL Nebulization Given 3/12/25 2056)   albuterol (PROVENTIL) nebulizer solution 0.083% 2.5 mg/3mL (has no administration in time range)   oseltamivir (TAMIFLU) capsule 75 mg (has no administration in time range)   sodium chloride 0.9 % flush 10 mL (has no administration in time range)   sodium chloride 0.9 % flush 10 mL (has no administration in time range)   sodium chloride 0.9 % infusion 40 mL (has no administration in time range)   ondansetron ODT (ZOFRAN-ODT) disintegrating tablet 4 mg (has no administration in time range)     Or   ondansetron (ZOFRAN) injection 4 mg (has no administration in time range)   nitroglycerin (NITROSTAT) SL tablet 0.4 mg (has no administration in time range)   Potassium Replacement - Follow Nurse / BPA Driven Protocol (has no administration in time range)   Magnesium Standard Dose Replacement - Follow Nurse / BPA Driven Protocol (has no administration in time range)   Phosphorus Replacement - Follow Nurse / BPA Driven Protocol (has no administration in time range)   Calcium Replacement - Follow Nurse / BPA Driven Protocol (has no administration in time range)   acetaminophen (TYLENOL) tablet 650 mg  (has no administration in time range)     Or   acetaminophen (TYLENOL) 160 MG/5ML oral solution 650 mg (has no administration in time range)     Or   acetaminophen (TYLENOL) suppository 650 mg (has no administration in time range)   sennosides-docusate (PERICOLACE) 8.6-50 MG per tablet 2 tablet (has no administration in time range)     And   polyethylene glycol (MIRALAX) packet 17 g (has no administration in time range)     And   bisacodyl (DULCOLAX) EC tablet 5 mg (has no administration in time range)     And   bisacodyl (DULCOLAX) suppository 10 mg (has no administration in time range)   guaiFENesin (MUCINEX) 12 hr tablet 1,200 mg (has no administration in time range)   methylPREDNISolone sodium succinate (SOLU-Medrol) injection 40 mg (has no administration in time range)   methylPREDNISolone sodium succinate (SOLU-Medrol) injection 125 mg (125 mg Intravenous Given 3/12/25 1728)   ipratropium-albuterol (DUO-NEB) nebulizer solution 3 mL (3 mL Nebulization Given 3/12/25 1633)   HYDROcodone Bit-Homatrop MBr (HYCODAN) 5-1.5 MG/5ML solution 5 mL (5 mL Oral Given 3/12/25 1700)   iopamidol (ISOVUE-370) 76 % injection 100 mL (88 mL Intravenous Given 3/12/25 1907)   oseltamivir (TAMIFLU) capsule 75 mg (75 mg Oral Given 3/12/25 2003)       Imaging results:  CT Angiogram Chest Pulmonary Embolism  Result Date: 3/12/2025   Pulmonary arteries are well-opacified, and there is no evidence of pulmonary embolism.  New areas of patchy alveolar type infiltrate in the posterior medial right lower lobe, and to a lesser degree in the right middle lobe, with associated bronchial thickening.    This report was finalized on 3/12/2025 7:27 PM by Dr. Immanuel Hunt M.D on Workstation: [id number]      XR Chest 1 View  Result Date: 3/12/2025  No focal pulmonary consolidation. Follow-up as clinical indications persist.  This report was finalized on 3/12/2025 4:48 PM by Dr. Jose Luis Gregorio M.D on Workstation: [id number]        Ambulatory  status:   - up ad zeyad    Social issues:   Social History     Socioeconomic History    Marital status:    Tobacco Use    Smoking status: Every Day     Current packs/day: 1.50     Average packs/day: 1.5 packs/day for 38.4 years (57.6 ttl pk-yrs)     Types: Cigarettes     Start date: 10/23/1986    Smokeless tobacco: Never   Vaping Use    Vaping status: Never Used   Substance and Sexual Activity    Alcohol use: Not Currently    Drug use: Not Currently    Sexual activity: Not Currently     Partners: Male     Birth control/protection: None       Peripheral Neurovascular  Peripheral Neurovascular (Adult)  Peripheral Neurovascular WDL: WDL    Neuro Cognitive  Neuro Cognitive (Adult)  Cognitive/Neuro/Behavioral WDL: WDL    Learning  Learning Assessment  Learning Readiness and Ability: no barriers identified    Respiratory  Respiratory WDL  Respiratory WDL: WDL  Breath Sounds  All Lung Fields Breath Sounds: All Fields  All Lung Fields Breath Sounds: Anterior:, Lateral:, diminished  Breath Sounds Post-Respiratory Treatment  Breath Sounds Posttreatment All Fields: All Fields  Breath Sounds Posttreatment All Fields: no change    Abdominal Pain       Pain Assessments  Pain (Adult)  (0-10) Pain Rating: Rest: 6    NIH Stroke Scale       Alvaro Steinberg RN  03/12/25 20:57 EDT

## 2025-03-13 NOTE — PROGRESS NOTES
ED OBSERVATION PROGRESS/DISCHARGE SUMMARY    Date of Admission: 3/12/2025   LOS: 0 days   PCP: Provider, No Known    Working diagnosis: Influenza A, hypoxia      Subjective     Hospital Outcome: Pending    Katharina Torres is a 64 y.o. female who was admitted to the ED observation unit for an acute COPD exacerbation.  In the ED patient was found to have influenza A.  She reports she has had an ongoing productive cough for roughly 3 months.  For the last 36 hours she has had significant body aches.  There is no associated chest pain, palpitations or GI symptoms.  Pulmonology is following.    Pulmonology following and at this juncture recommends holding on glucocorticoids for now as it has shown increased mortality in patients with influenza.  Continue with Tamiflu and continue bronchodilators.  Tobacco cessation discussed in detail.      ROS:  General: Chills and myalgias  Respiratory: Dyspnea, cough  Cardiovascular: no chest pain, palpitations  Abdomen: No abdominal pain, nausea, vomiting, or diarrhea  Neurologic: No focal weakness    Objective   Physical Exam:  I have reviewed the vital signs.  Temp:  [98.1 °F (36.7 °C)-98.5 °F (36.9 °C)] 98.5 °F (36.9 °C)  Heart Rate:  [72-92] 73  Resp:  [18-22] 20  BP: ()/(72-82) 116/82  General Appearance:    Alert, cooperative, no distress  Head:    Normocephalic, atraumatic  Eyes:    Sclerae anicteric  Neck:   Supple, no mass  Lungs: Clear to auscultation bilaterally, respirations unlabored  Heart: Regular rate and rhythm, S1 and S2 normal, no murmur, rub or gallop  Abdomen:  Soft, nontender, bowel sounds active, nondistended  Extremities: No clubbing, cyanosis, or edema to lower extremities  Pulses:  2+ and symmetric in distal lower extremities  Skin: No rashes   Neurologic: Oriented x3, Normal strength to extremities    Results Review:    I have reviewed the labs, radiology results and diagnostic studies.    Results from last 7 days   Lab Units 03/13/25  0348   WBC  10*3/mm3 8.52   HEMOGLOBIN g/dL 14.1   HEMATOCRIT % 44.8   PLATELETS 10*3/mm3 182     Results from last 7 days   Lab Units 03/13/25  0348 03/12/25  1654   SODIUM mmol/L 142 141   POTASSIUM mmol/L 4.2 3.2*   CHLORIDE mmol/L 105 101   CO2 mmol/L 24.7 25.3   BUN mg/dL 5* 6*   CREATININE mg/dL 0.45* 0.62   CALCIUM mg/dL 8.8 9.0   BILIRUBIN mg/dL  --  0.5   ALK PHOS U/L  --  89   ALT (SGPT) U/L  --  20   AST (SGOT) U/L  --  26   GLUCOSE mg/dL 123* 91     Imaging Results (Last 24 Hours)       ** No results found for the last 24 hours. **            I have reviewed the medications.     Discharge Medications        ASK your doctor about these medications        Instructions Start Date   albuterol 1.25 MG/3ML nebulizer solution  Commonly known as: ACCUNEB   1.25 mg, Nebulization, Every 6 Hours PRN      albuterol sulfate  (90 Base) MCG/ACT inhaler  Commonly known as: PROVENTIL HFA;VENTOLIN HFA;PROAIR HFA   INHALE 2 PUFFS BY MOUTH EVERY 6 HOURS AS NEEDED FOR SHORTNESS OF AIR      ALPRAZolam 1 MG tablet  Commonly known as: XANAX   1 mg, Oral, 3 Times Daily      atorvastatin 40 MG tablet  Commonly known as: LIPITOR   40 mg, Oral, Daily      buprenorphine-naloxone 8-2 MG per SL tablet  Commonly known as: SUBOXONE   1 tablet, Daily      citalopram 20 MG tablet  Commonly known as: CeleXA   20 mg, Daily      docusate sodium 100 MG capsule  Commonly known as: COLACE   100 mg, Oral, 2 Times Daily PRN      furosemide 40 MG tablet  Commonly known as: LASIX   80 mg, Oral, 2 Times Daily      hydrOXYzine 25 MG tablet  Commonly known as: ATARAX   25 mg, Oral, 3 Times Daily PRN      levothyroxine 175 MCG tablet  Commonly known as: SYNTHROID, LEVOTHROID   150 mcg, Daily      lisinopril 20 MG tablet  Commonly known as: PRINIVIL,ZESTRIL   20 mg, Oral, Daily      metFORMIN 500 MG tablet  Commonly known as: GLUCOPHAGE   500 mg, Oral, 2 Times Daily With Meals      ondansetron 4 MG tablet  Commonly known as: ZOFRAN   4 mg, Every 8 Hours  PRN      potassium chloride 10 MEQ CR tablet   10 mEq, Oral, Daily      predniSONE 20 MG tablet  Commonly known as: DELTASONE   60 mg, Oral, Daily      traZODone 150 MG tablet  Commonly known as: DESYREL   150 mg, Nightly              ---------------------------------------------------------------------------------------------  Assessment & Plan   Assessment/Problem List    Influenza A      Plan:    Influenza A  COPD exacerbation  History of lung cancer-currently in remission being treated with Keytruda  -Cardiac monitor  -Vital signs every 4 hours  -Continuous pulse ox  -Mucinex 1200 mg p.o. twice daily  -DuoNebs every 4 hours  -Albuterol mini nebs as needed  -Incentive spirometer every 4 hours while awake  -Tamiflu 75 mg p.o. twice daily for 5 days  -Zithromax 500 mg p.o. nightly x 3 days  -Pulmonology following     Tobacco abuse  -NicoDerm patch  -As needed Nicorette gum     Hypothyroidism  -Continue home dose Synthroid     VTE Prophylaxis:  Mechanical VTE prophylaxis orders are present.       Disposition: Pending    Follow-up after Discharge: Pending    This note will serve as a progress note    Justice Alanis III, PA 03/14/25 08:02 EDT    I have worn appropriate PPE during this patient encounter, sanitized my hands both with entering and exiting patient's room.      50 minutes has been spent by [facility] providers in the care of this patient while under observation status

## 2025-03-13 NOTE — H&P
[facility] [place] [facility]   HISTORY AND PHYSICAL    Patient Name: Katharina Torres  : 1960  MRN: [id number]  Primary Care Physician:  Provider, No Known  Date of admission: 3/12/2025    Subjective   Subjective     Chief Complaint:   Chief Complaint   Patient presents with    Cough         HPI:    Katharina Torres is a 64 y.o. female with a past medical history including, but not limited to, anxiety, bipolar disorder, COPD, lung cancer currently in remission taking Keytruda, depression, diabetes, hypertension, and hyperlipidemia, is admitted to the observation unit with influenza A and COPD exacerbation.  States she has had a productive cough for over 3 months.  However in the last 24 hours she has had bodyaches.  She denies any fever, chills, nausea, vomiting, diarrhea, chest pain, or abdominal pain..  She does state that she will have\" coughing fits\" that will cause her to vomit.  She does state that her daughter, whom she is the primary caregiver for, was sick over the past 2 weeks but did not seek medical care.  Pulmonology has been consulted to see the patient.      Review of Systems   All systems were reviewed and negative except for: What was mentioned above in the HPI.    Personal History     Past Medical History:   Diagnosis Date    Addiction     ALCOHOL LAST USED 1 YEAR AGO, AMPHETAMINES LAST USED 22, PILLS PERCOCET LAST USED 2022    Afib     APPROX 1 YEAR AGO - [facility]    Allergic     Anxiety     Arthritis     KNEE, RIGHT KNEE    Bipolar 1 disorder     Cancer     LUNG CA/ BRAIN- RADIATION    COPD (chronic obstructive pulmonary disease)     Depression     Diabetes mellitus     DVT, bilateral lower limbs     Hyperlipidemia     Hypertension     Pneumonia     Thyroid disease        Past Surgical History:   Procedure Laterality Date    CARPAL TUNNEL RELEASE      HEEL SPUR SURGERY Left     VENOUS ACCESS DEVICE (PORT) INSERTION      FOR CHEMO       Family History: family history includes Cancer in her " father; Diabetes in her father and mother; Heart disease in her father and mother; Stroke in her father. Otherwise pertinent FHx was reviewed and not pertinent to current issue.    Social History:  reports that she has been smoking cigarettes. She started smoking about 38 years ago. She has a 57.6 pack-year smoking history. She has never used smokeless tobacco. She reports that she does not currently use alcohol. She reports that she does not currently use drugs.    Home Medications:  ALPRAZolam, albuterol, albuterol sulfate HFA, amLODIPine, atorvastatin, buprenorphine-naloxone, citalopram, docusate sodium, furosemide, hydrOXYzine, levothyroxine, lisinopril, metFORMIN, ondansetron, potassium chloride, predniSONE, and traZODone    Allergies:  No Known Allergies    Objective   Objective     Vitals:   Temp:  [98.3 °F (36.8 °C)] 98.3 °F (36.8 °C)  Heart Rate:  [71-89] 82  Resp:  [16] 16  BP: (138-151)/(80-93) 142/89  Physical Exam   Constitutional: Awake, appears to not feel well  Eyes: PERRLA   HENT: NCAT, mucous membranes moist   Neck: Supple   Respiratory: Clear to auscultation bilaterally, nonlabored respirations    Cardiovascular: regular rate, palpable pedal pulses bilaterally   Gastrointestinal: Positive bowel sounds, soft, nontender, nondistended   Musculoskeletal: No bilateral ankle edema   Psychiatric: Flat affect, cooperative   Neurologic: Oriented x 3, speech clear   Skin: No rashes       Result Review    Result Review:  I have personally reviewed the results from the time of this admission to 3/12/2025 21:40 EDT and agree with these findings:  [x]  Laboratory list / accordion  []  Microbiology  [x]  Radiology  []  EKG/Telemetry   []  Cardiology/Vascular   []  Pathology  [x]  Old records  []  Other:    Initial lab work in the emergency department is unremarkable other than potassium 3.2, D-dimer 1.13, all other lab work is baseline for the patient.  Blood cultures are pending at this time.  Respiratory viral  panel PCR is positive for influenza A.  Chest x-ray shows no focal pulmonary consolidations.  EKG shows sinus rhythm, rate of 69.  CTA of the chest shows pulmonary arteries are well-opacified and there is no evidence of pulmonary embolism.  New areas of patchy alveolar type infiltrate in the posterior medial right lower lobe and to a lesser degree in the right middle lobe with associated bronchial thickening.      Assessment & Plan   Assessment / Plan     Brief Patient Summary:  Katharina Torres is a 64 y.o. female who was admitted to the observation unit for further evaluation and treatment of her cough, influenza A, and COPD exacerbation.  Neurology has been consulted to see the patient.    Active Hospital Problems:  Active Hospital Problems    Diagnosis     **Influenza A      Plan:   Influenza A  COPD exacerbation  History of lung cancer-currently in remission being treated with Keytruda  -Cardiac monitor  -Vital signs every 4 hours  -Continuous pulse ox  -Mucinex 1200 mg p.o. twice daily  -DuoNebs every 4 hours  -Albuterol mini nebs as needed  -Incentive spirometer every 4 hours while awake  -Tamiflu 75 mg p.o. twice daily for 5 days  -Zithromax 500 mg p.o. nightly x 3 days  -Pulmonology consult    Tobacco abuse  -NicoDerm patch  -As needed Nicorette gum    Hypothyroidism  -Continue home dose Synthroid    VTE Prophylaxis:  Mechanical VTE prophylaxis orders are present.        CODE STATUS:    Code Status (Patient has no pulse and is not breathing): CPR (Attempt to Resuscitate)  Medical Interventions (Patient has pulse or is breathing): Full Support    Admission Status:  I believe this patient meets observation status.    78 minutes have been spent by [facility] providers in the care of this patient while under observation status.      Appropriate PPE worn during patient encounter.  Hand hygeine performed before and after seeing the patient.      Electronically signed by Bette Denise  APRN, 03/12/25, 9:40 PM EDT.

## 2025-03-13 NOTE — CASE MANAGEMENT/SOCIAL WORK
Discharge Planning Assessment  [facility]     Patient Name: Katharina Torres  MRN: [id number]  Today's Date: 3/13/2025    Admit Date: 3/12/2025    Plan: plans to return home , with family to assist with care and transport to home.   Discharge Needs Assessment       Row Name 03/13/25 1454       Living Environment    People in Home child(andrew), dependent    Name(s) of People in Home Katharina rnagel her granddaughter Helga.    Unique Family Situation She lives in an apartment with her special needs high functioning granddaughter.    Current Living Arrangements apartment    Potentially Unsafe Housing Conditions none    In the past 12 months has the electric, gas, oil, or water company threatened to shut off services in your home? No    Primary Care Provided by self    Provides Primary Care For grandchild(andrew)    Caregiving Concerns special needs child, high functioning    Family Caregiver if Needed child(andrew), adult    Family Caregiver Names Oliva Cornell [phone]    Quality of Family Relationships helpful;involved;supportive       Resource/Environmental Concerns    Resource/Environmental Concerns none    Transportation Concerns none       Transportation Needs    In the past 12 months, has lack of transportation kept you from medical appointments or from getting medications? no    In the past 12 months, has lack of transportation kept you from meetings, work, or from getting things needed for daily living? No       Food Insecurity    Within the past 12 months, you worried that your food would run out before you got the money to buy more. Never true    Within the past 12 months, the food you bought just didn't last and you didn't have money to get more. Never true       Transition Planning    Patient/Family Anticipates Transition to home with family;home with help/services  pt expresses concern that she may need home health for oxygen services.    Patient/Family Anticipated Services at Transition durable [facility]  equipment;home health care    Transportation Anticipated family or friend will provide       Discharge Needs Assessment    Readmission Within the Last 30 Days no previous admission in last 30 days    Equipment Currently Used at Home cane, quad    Concerns to be Addressed discharge planning    Do you want help finding or keeping work or a job? I do not need or want help    Do you want help with school or training? For example, starting or completing job training or getting a high school diploma, GED or equivalent Patient declined    Anticipated Changes Related to Illness inability to care for someone else    Equipment Needed After Discharge oxygen    Provided Post Acute Provider List? N/A    Provided Post Acute Provider Quality & Resource List? N/A                   Discharge Plan       Row Name 03/13/25 7550       Plan    Plan plans to return home , with family to assist with care and transport to home.    Patient/Family in Agreement with Plan yes    Plan Comments Stayed at entrance at the door, mask in place per protoacol. Identified self and role of [facility] nurse/discharge planner. Verfied pt address is [address] [place] [address] [address]. [place] [address].  Verified phone number is [phone]. Verified MD is Dr Haynes with [facility] on [address] [place] [address]. Verified pharmacy is  [facility] on [facility].  Pt admitted for short of Air. Dx Flu A. Pt denies any new needs other than possible oxygen therapy. Pt reports continued shortness of breath, is currently on oygen at 2 liter @ NC>  Pt adrienne participate in Meds to Beds. Pt lives in an apartment with few steps to enter. No further need identified at this time. NO new needs identified.  CCP to follow for any further needs. DIAMOND Rand RN ccp                       Demographic Summary       Row Name 03/13/25 5956       General Information    Admission Type observation    Arrived From emergency department    Required Notices Provided Observation Status Notice    Referral  Source emergency department    Reason for Consult discharge planning    Preferred Language English    General Information Comments Verified pt address and phone number demographics are correct.       Contact Information    Permission Granted to Share Info With family/designee    Contact Information Comments Gudelia Cornell daughter [phone].                   Functional Status       Row Name 03/13/25 1532       Functional Status    Usual Activity Tolerance moderate    Current Activity Tolerance fair    Functional Status Comments pthas been independent with all her ADLs until this FLu A episode.       Physical Activity    On average, how many days per week do you engage in moderate to strenuous exercise (like a brisk walk)? 0 days    On average, how many minutes do you engage in exercise at this level? 0 min    Number of minutes of exercise per week 0       Assessment of Health Literacy    How often do you have someone help you read hospital materials? Never    How often do you have problems learning about your medical condition because of difficulty understanding written information? Never    How often do you have a problem understanding what is told to you about your medical condition? Never    How confident are you filling out medical forms by yourself? Quite a bit    Health Literacy Good       Functional Status, IADL    Medications independent    Meal Preparation independent    Housekeeping assistive person  Granddaughter Helga helps her.    Laundry assistive person    Shopping assistive equipment and person  pt now uses [employer] click [employer].    IADL Comments Pt reports she is able to take care of all her personal needs, she is concerned that her oxygen levels drop , and that she may need home oxygen.       Mental Status    General Appearance WDL WDL       Mental Status Summary    Recent Changes in Mental Status/Cognitive Functioning no changes       Employment/    Employment Status retired                    Psychosocial    No documentation.                  Abuse/Neglect    No documentation.                  Legal    No documentation.                  Substance Abuse    No documentation.                  Patient Forms    No documentation.                     Solange Rand RN

## 2025-03-13 NOTE — PROGRESS NOTES
"  Daily Progress Note.   [facility] OBSERVATION  3/13/2025    Patient:  Name:  Katharina Torres  MRN:  [id number]  1960  64 y.o.  female         Reason for Consultation:  influenza A, hypoxia     CC: cough, shortness of breath     History of Present Illness:  Katharina Torres is a 64 year old female with a past medical history of COPD, hyperlipidemia, hypertension, hypothyroidism, substance abuse (hx of methamphetamine), bipolar 1 disorder, and history of lung cancer with metastasis to brain status post radiation and immunotherapy Keytruda (currently in remission). Current ongoing tobacco use.    Interval History:  Still some cough sputum production no hemoptysis no lethargy she complains of a lot of calf pain.  No worsening fever positive headache      Physical Exam:  /73 (BP Location: Right arm, Patient Position: Lying)   Pulse 87   Temp 98.1 °F (36.7 °C) (Oral)   Resp 18   Ht 162.6 cm (64\")   Wt 83.9 kg (185 lb)   SpO2 91%   BMI 31.76 kg/m²   Body mass index is 31.76 kg/m².  No intake or output data in the 24 hours ending 03/13/25 1313  General appearance: Nontoxic, conversant   Eyes: anicteric sclerae, moist conjunctivae; no lidlag;    HENT: Atraumatic; oropharynx clear with moist mucous membranes    Neck: Trachea midline;  supple   Lungs: No expiratory wheeze mild rhonchi, with normal respiratory effort and no intercostal retractions  CV: RRR, no rub   Abdomen: Soft, nontender; BS+  Extremities: No peripheral edema or ext lad  Skin: WWP no diffuse visible rash  Psych: Appropriate affect, alert   Neuro: Moves all ext. CN II-XII. Speech intact.    Data Review:  Notable Labs:  Results from last 7 days   Lab Units 03/13/25  0348 03/12/25  1654   WBC 10*3/mm3 8.52 7.29   HEMOGLOBIN g/dL 14.1 14.4   PLATELETS 10*3/mm3 182 172     Results from last 7 days   Lab Units 03/13/25  0348 03/12/25  1654   SODIUM mmol/L 142 141   POTASSIUM mmol/L 4.2 3.2*   CHLORIDE mmol/L 105 101   CO2 mmol/L 24.7 25.3 "   BUN mg/dL 5* 6*   CREATININE mg/dL 0.45* 0.62   GLUCOSE mg/dL 123* 91   CALCIUM mg/dL 8.8 9.0   Estimated Creatinine Clearance: 135.9 mL/min (A) (by C-G formula based on SCr of 0.45 mg/dL (L)).    Results from last 7 days   Lab Units 03/13/25  0348 03/12/25  1654   AST (SGOT) U/L  --  26   ALT (SGPT) U/L  --  20   LACTATE mmol/L  --  1.2   D DIMER QUANT MCGFEU/mL  --  1.13*   PLATELETS 10*3/mm3 182 172       Results from last 7 days   Lab Units 03/12/25  2035   PH, ARTERIAL pH units 7.483*   PCO2, ARTERIAL mm Hg 35.4   PO2 ART mm Hg 54.9*   HCO3 ART mmol/L 26.5       Imaging:  Reviewed chest images personally from past 3 days    A/P:  Influenza A  COPD without acute exacerbation  Acute hypoxemia  Hypokalemia  Ongoing tobacco abuse  Hypothyroidism  Bipolar disorder  History of lung cancer, currently in remission        Admitted to observation for management of COPD exacerbation/ influenza A.  Tamiflu for influenza A +supportive care.  Continue supplemental oxygen as needed to maintain SpO2 >90%.  No active wheezing on exam- continue scheduled bronchodilators.     Okay to give steroids if worsening wheezing or chest tightness it is okay to treat with steroids in patients with exacerbations of COPD secondary to flu    Tobacco cessation education provided- nicotine patch and PRN nicotine gum ordered.     Significant calf pain sent for duplex venous lower extremity for completeness  Discussed with bedside nursing    All issues new to me today.  Prior hospital course, labs and imaging reviewed.      Electronically signed by Donald East MD, 03/13/25, 1:13 PM EDT.  [facility]

## 2025-03-13 NOTE — CONSULTS
Group: [facility]         CONSULT NOTE    Patient Identification:  Katharina Torres  64 y.o.  female  1960  [id number]            Reason for Consultation:  influenza A, hypoxia    CC: cough, shortness of breath    History of Present Illness:  Katharina Torres is a 64 year old female with a past medical history of COPD, hyperlipidemia, hypertension, hypothyroidism, substance abuse (hx of methamphetamine), bipolar 1 disorder, and history of lung cancer with metastasis to brain status post radiation and immunotherapy Keytruda (currently in remission). Current ongoing tobacco use.    She presented to the emergency department on 3/12/2025 with complaints of productive cough and shortness of breath.  Patient endorses symptoms ongoing for approximately 3 months-she has been seen at urgent treatment center multiple times and treated with steroids, antibiotics without improvement.  She reports productive cough with green/brown sputum.  Denies any fever, chills or sick contacts.  Reports that cough is so severe that she has posttussive emesis.  ED evaluation included: RVP positive for influenza A, proBNP 16.1, WBC 7.27.  CTA chest was negative for pulmonary embolism but did show areas of patchy nodular type infiltrate in the posterior medial right lower lobe, right middle lobe with associated bronchial thickening.  ABG showed hypoxemia.    Patient was initially described as hypoxic, however when oxygen saturation probe was moved from finger to forehead, patient's oxygen saturation 94% on room air.  She is being admitted to observation for influenza A.    Review of Systems:  CONSTITUTIONAL:  Denies fevers or chills  EYE:  No new vision changes  EAR:  No change in hearing  CARDIAC:  No chest pain  PULMONARY:  +productive cough (green/brown sputum), wheezing, shortness of breath  GI:  No diarrhea, hematemesis or hematochezia, +posttussive emesis  RENAL:  No dysuria or urinary frequency  MUSCULOSKELETAL:  No  "musculoskeletal complaints  ENDOCRINE:  No heat or cold intolerance  INTEGUMENTARY: No skin rashes  NEUROLOGICAL:  No dizziness or confusion.  No seizure activity  PSYCHIATRIC:  No new anxiety or depression  12 system review of systems performed and all else negative     Past Medical History:  Past Medical History:   Diagnosis Date    Addiction     ALCOHOL LAST USED 1 YEAR AGO, AMPHETAMINES LAST USED 9/21/22, PILLS PERCOCET LAST USED 06/2022    Afib     APPROX 1 YEAR AGO - [facility]    Allergic     Anxiety     Arthritis     KNEE, RIGHT KNEE    Bipolar 1 disorder     Cancer     LUNG CA/ BRAIN- RADIATION    COPD (chronic obstructive pulmonary disease)     Depression     Diabetes mellitus     DVT, bilateral lower limbs     Hyperlipidemia     Hypertension     Pneumonia     Thyroid disease        Past Surgical History:  Past Surgical History:   Procedure Laterality Date    CARPAL TUNNEL RELEASE      HEEL SPUR SURGERY Left     VENOUS ACCESS DEVICE (PORT) INSERTION      FOR CHEMO        Home Meds:  (Not in a hospital admission)      Allergies:  No Known Allergies    Social History:   Social History     Socioeconomic History    Marital status:    Tobacco Use    Smoking status: Every Day     Current packs/day: 1.50     Average packs/day: 1.5 packs/day for 38.4 years (57.6 ttl pk-yrs)     Types: Cigarettes     Start date: 10/23/1986    Smokeless tobacco: Never   Vaping Use    Vaping status: Never Used   Substance and Sexual Activity    Alcohol use: Not Currently    Drug use: Not Currently    Sexual activity: Not Currently     Partners: Male     Birth control/protection: None       Family History:  Family History   Problem Relation Age of Onset    Heart disease Mother     Diabetes Mother     Cancer Father     Stroke Father     Heart disease Father     Diabetes Father        Physical Exam:  /89   Pulse 82   Temp 98.3 °F (36.8 °C) (Oral)   Resp 16   Ht 162.6 cm (64\")   Wt 83.9 kg (185 lb)   SpO2 91%  "  BMI 31.76 kg/m²  Body mass index is 31.76 kg/m². 91% 83.9 kg (185 lb)  Constitutional: Middle aged (appears older than stated age) female pt in bed, No acute respiratory distress, no accessory muscle use, SpO2 94% on room air  Head: - NCAT  Eyes: No pallor, Anicteric conjunctiva, EOMI.  ENMT:  Mallampati 3, no oral thrush. Dry MM.   NECK: Trachea midline, No thyromegaly, no palpable cervical LNpathy  Heart: RRR, no murmur. No pedal edema   Lungs: BANDAR +, Clear, no wheezing, crackles, rales, +cough  Abdomen: Soft. No tenderness, guarding or rigidity. No palpable masses  Extremities: Extremities warm and well perfused. No cyanosis/ clubbing  Neuro: Conscious, answers appropriately, no gross focal neuro deficits  Psych: Mood and affect appropriate    PPE recommended per [facility] infectious disease Isolation protocol for the current clinical scenario(as mentioned below) was followed.      LABS:  COVID19   Date Value Ref Range Status   03/12/2025 Not Detected Not Detected - Ref. Range Final       Lab Results   Component Value Date    CALCIUM 9.0 03/12/2025    PHOS 2.5 10/25/2021     Results from last 7 days   Lab Units 03/12/25  1654   SODIUM mmol/L 141   POTASSIUM mmol/L 3.2*   CHLORIDE mmol/L 101   CO2 mmol/L 25.3   BUN mg/dL 6*   CREATININE mg/dL 0.62   GLUCOSE mg/dL 91   CALCIUM mg/dL 9.0   WBC 10*3/mm3 7.29   HEMOGLOBIN g/dL 14.4   PLATELETS 10*3/mm3 172   ALT (SGPT) U/L 20   AST (SGOT) U/L 26   PROBNP pg/mL 60.1     Lab Results   Component Value Date    CKTOTAL 138 07/18/2022    CKMB 5.09 07/18/2022    TROPONINI <0.010 07/20/2021    TROPONINT <0.010 09/15/2022             Results from last 7 days   Lab Units 03/12/25  1654   LACTATE mmol/L 1.2     Results from last 7 days   Lab Units 03/12/25 2035   PH, ARTERIAL pH units 7.483*   PCO2, ARTERIAL mm Hg 35.4   PO2 ART mm Hg 54.9*   O2 SATURATION ART % 90.5*   MODALITY  Room Air     Results from last 7 days   Lab Units 03/12/25  0442   ADENOVIRUS DETECTION BY  PCR  Not Detected   CORONAVIRUS 229E  Not Detected   CORONAVIRUS HKU1  Not Detected   CORONAVIRUS NL63  Not Detected   CORONAVIRUS OC43  Not Detected   HUMAN METAPNEUMOVIRUS  Not Detected   HUMAN RHINOVIRUS/ENTEROVIRUS  Not Detected   INFLUENZA B PCR  Not Detected   PARAINFLUENZA 1  Not Detected   PARAINFLUENZA VIRUS 2  Not Detected   PARAINFLUENZA VIRUS 3  Not Detected   PARAINFLUENZA VIRUS 4  Not Detected   BORDETELLA PERTUSSIS PCR  Not Detected   BORDETELLA PARAPERTUSSIS PCR  Not Detected   [id number]  Detected*   CHLAMYDOPHILA PNEUMONIAE PCR  Not Detected   MYCOPLAMA PNEUMO PCR  Not Detected   RSV, PCR  Not Detected     Results from last 7 days   Lab Units 03/12/25  1654   INR  1.26*         Lab Results   Component Value Date    TSH 19.380 (H) 03/11/2022     Estimated Creatinine Clearance: 98.6 mL/min (by C-G formula based on SCr of 0.62 mg/dL).         Imaging: I personally visualized the images of scans/x-rays performed within last 3 days.      Assessment:  Influenza A  COPD exacerbation  Acute hypoxemia  Hypokalemia  Ongoing tobacco abuse  Hypothyroidism  Bipolar disorder  History of lung cancer, currently in remission    Recommendations:  Admitted to observation for management of COPD exacerbation/ influenza A.  Tamiflu for influenza A +supportive care.  Continue supplemental oxygen as needed to maintain SpO2 >90%.  No active wheezing on exam- continue scheduled bronchodilators.   Would hold off on glucocorticoids as shown to increase mortality in patients with influenza.  Tobacco cessation education provided- nicotine patch and PRN nicotine gum ordered.      Patient was placed in face mask upon entering room and kept mask on throughout our encounter. I wore full protective equipment throughout this patient encounter including a face mask, gown and gloves. Hand hygiene was performed before donning protective equipment and after removal when leaving the room.    Afshan Buckner, APRN  3/12/2025  21:12  EDT      Much of this encounter note is an electronic transcription/translation of spoken language to printed text using Dragon Software.

## 2025-03-13 NOTE — PROGRESS NOTES
MD Attestation Note    SHARED VISIT: This visit was performed by BOTH a physician and an APC. The substantive portion of the medical decision making was performed by this attesting physician who made or approved the management plan and takes responsibility for patient management. All studies in the APC note (if performed) were independently interpreted by me.       My personal findings are:        Subjective:  Patient admitted for further management of shortness of breath, influenza.  She states that she is still coughing a lot and continues to feel short of breath.  She is currently on about 3 L nasal cannula oxygen.  She does not wear home oxygen.        Objective:  GENERAL: Awake, alert, in no acute distress.  Coughing frequently.  HENT:  Normocephalic, atraumatic. Nares patent.   EYES: Pupils equal, reactive. Extraocular movements normal.   RESPIRATORY: Normal effort.   CARDIOVASCULAR: Regular rhythm, normal rate.  ABDOMEN:  Nontender. Abdomen nondistended.   EXTREMITIES: No pedal edema. 2+ pedal pulses bilaterally. No deformity.   SKIN:  No rash, normal to inspection.      CT Angiogram Chest Pulmonary Embolism  Result Date: 3/12/2025  CTA CHEST WITH IV CONTRAST  HISTORY: History of lung cancer, productive cough and dyspnea  COMPARISON: Chest CT 9/15/2022  TECHNIQUE: CT angiography was performed of the chest with axial images as well as coronal and sagittal reformatted MIP images provided following administration of IV contrast. 3-D surface rendered reformats were obtained of the pulmonary arteries and aorta. Radiation dose reduction techniques were utilized, including automated exposure control, and exposure modulation based on body size.  FINDINGS:  There is a region of somewhat patchy alveolar infiltrate in the posterior medial right lower lobe, and there is some associated bronchial wall thickening/bronchial narrowing. Somewhat similar though less prominent findings are seen in the right middle lobe as well.   There is no dense pulmonary consolidation, pleural effusion or pneumothorax.  The thoracic aorta is normal in caliber, and there are coronary atherosclerotic vascular calcifications. There is no new or otherwise suspicious mediastinal or hilar adenopathy or mass, though there are chronic soft tissue changes in the right hilum with irregularity of the right mainstem and upper lobe bronchus, unchanged, presumably related to prior therapy.  Images of the upper abdomen show no acute abnormality; a mixed density partially calcified right adrenal nodule is redemonstrated.  There is no acute bony abnormality.  Bolus timing is good and there is no evidence of pulmonary embolism.       Pulmonary arteries are well-opacified, and there is no evidence of pulmonary embolism.  New areas of patchy alveolar type infiltrate in the posterior medial right lower lobe, and to a lesser degree in the right middle lobe, with associated bronchial thickening.    This report was finalized on 3/12/2025 7:27 PM by Dr. Immanuel Hunt M.D on Workstation: [id number]      XR Chest 1 View  Result Date: 3/12/2025  XR CHEST 1 VW-  HISTORY: Female who is 64 years-old, cough  TECHNIQUE: Frontal view of the chest  COMPARISON: 9/15/2022  FINDINGS: Right chest port appears stable. The heart size is borderline. Pulmonary vasculature is unremarkable. No focal pulmonary consolidation, pleural effusion, or pneumothorax. No acute osseous process.      No focal pulmonary consolidation. Follow-up as clinical indications persist.  This report was finalized on 3/12/2025 4:48 PM by Dr. Jose Luis Gregorio M.D on Workstation: [id number]          Assessment/ Plan:  Influenza A, COPD exacerbation  CTA chest negative for pulmonary embolism but she does have findings consistent with bronchitis.  Continue bronchodilators and Tamiflu  Pulmonology recommended not using glucocorticoids due to concomitant influenza infection  Wean O2 as tolerated.  If patient has ongoing  oxygen requirement, may need to arrange home oxygen.

## 2025-03-14 PROBLEM — J44.1 COPD WITH ACUTE EXACERBATION: Status: ACTIVE | Noted: 2025-03-14

## 2025-03-14 LAB
ANION GAP SERPL CALCULATED.3IONS-SCNC: 12 MMOL/L (ref 5–15)
BASOPHILS # BLD AUTO: 0.01 10*3/MM3 (ref 0–0.2)
BASOPHILS NFR BLD AUTO: 0.1 % (ref 0–1.5)
BUN SERPL-MCNC: 12 MG/DL (ref 8–23)
BUN/CREAT SERPL: 19.4 (ref 7–25)
CALCIUM SPEC-SCNC: 8.5 MG/DL (ref 8.6–10.5)
CHLORIDE SERPL-SCNC: 101 MMOL/L (ref 98–107)
CO2 SERPL-SCNC: 26 MMOL/L (ref 22–29)
CREAT SERPL-MCNC: 0.62 MG/DL (ref 0.57–1)
DEPRECATED RDW RBC AUTO: 42.3 FL (ref 37–54)
EGFRCR SERPLBLD CKD-EPI 2021: 100.8 ML/MIN/1.73
EOSINOPHIL # BLD AUTO: 0.03 10*3/MM3 (ref 0–0.4)
EOSINOPHIL NFR BLD AUTO: 0.3 % (ref 0.3–6.2)
ERYTHROCYTE [DISTWIDTH] IN BLOOD BY AUTOMATED COUNT: 13.1 % (ref 12.3–15.4)
GLUCOSE SERPL-MCNC: 94 MG/DL (ref 65–99)
HCT VFR BLD AUTO: 44.8 % (ref 34–46.6)
HGB BLD-MCNC: 14.5 G/DL (ref 12–15.9)
IMM GRANULOCYTES # BLD AUTO: 0.04 10*3/MM3 (ref 0–0.05)
IMM GRANULOCYTES NFR BLD AUTO: 0.4 % (ref 0–0.5)
LYMPHOCYTES # BLD AUTO: 2.25 10*3/MM3 (ref 0.7–3.1)
LYMPHOCYTES NFR BLD AUTO: 21.3 % (ref 19.6–45.3)
MCH RBC QN AUTO: 28.5 PG (ref 26.6–33)
MCHC RBC AUTO-ENTMCNC: 32.4 G/DL (ref 31.5–35.7)
MCV RBC AUTO: 88 FL (ref 79–97)
MONOCYTES # BLD AUTO: 0.74 10*3/MM3 (ref 0.1–0.9)
MONOCYTES NFR BLD AUTO: 7 % (ref 5–12)
NEUTROPHILS NFR BLD AUTO: 7.47 10*3/MM3 (ref 1.7–7)
NEUTROPHILS NFR BLD AUTO: 70.9 % (ref 42.7–76)
NRBC BLD AUTO-RTO: 0 /100 WBC (ref 0–0.2)
PLATELET # BLD AUTO: 187 10*3/MM3 (ref 140–450)
PMV BLD AUTO: 9.7 FL (ref 6–12)
POTASSIUM SERPL-SCNC: 3.7 MMOL/L (ref 3.5–5.2)
RBC # BLD AUTO: 5.09 10*6/MM3 (ref 3.77–5.28)
SODIUM SERPL-SCNC: 139 MMOL/L (ref 136–145)
WBC NRBC COR # BLD AUTO: 10.54 10*3/MM3 (ref 3.4–10.8)

## 2025-03-14 PROCEDURE — 94799 UNLISTED PULMONARY SVC/PX: CPT

## 2025-03-14 PROCEDURE — 80048 BASIC METABOLIC PNL TOTAL CA: CPT | Performed by: PHYSICIAN ASSISTANT

## 2025-03-14 PROCEDURE — 94761 N-INVAS EAR/PLS OXIMETRY MLT: CPT

## 2025-03-14 PROCEDURE — 94664 DEMO&/EVAL PT USE INHALER: CPT

## 2025-03-14 PROCEDURE — 85025 COMPLETE CBC W/AUTO DIFF WBC: CPT | Performed by: PHYSICIAN ASSISTANT

## 2025-03-14 PROCEDURE — 25010000002 ONDANSETRON PER 1 MG

## 2025-03-14 RX ORDER — OXYMETAZOLINE HYDROCHLORIDE 0.05 G/100ML
2 SPRAY NASAL 2 TIMES DAILY
Status: DISCONTINUED | OUTPATIENT
Start: 2025-03-14 | End: 2025-03-16 | Stop reason: HOSPADM

## 2025-03-14 RX ORDER — PREDNISONE 20 MG/1
40 TABLET ORAL
Status: DISCONTINUED | OUTPATIENT
Start: 2025-03-14 | End: 2025-03-14

## 2025-03-14 RX ORDER — ECHINACEA PURPUREA EXTRACT 125 MG
2 TABLET ORAL AS NEEDED
Status: DISCONTINUED | OUTPATIENT
Start: 2025-03-14 | End: 2025-03-16 | Stop reason: HOSPADM

## 2025-03-14 RX ORDER — HYDROCODONE BITARTRATE AND HOMATROPINE METHYLBROMIDE ORAL SOLUTION 5; 1.5 MG/5ML; MG/5ML
5 LIQUID ORAL EVERY 6 HOURS PRN
Refills: 0 | Status: DISCONTINUED | OUTPATIENT
Start: 2025-03-14 | End: 2025-03-16 | Stop reason: HOSPADM

## 2025-03-14 RX ADMIN — Medication 10 ML: at 08:48

## 2025-03-14 RX ADMIN — HYDROCODONE BITARTRATE AND HOMATROPINE METHYLBROMIDE ORAL SOLUTION 5 ML: 5; 1.5 LIQUID ORAL at 06:21

## 2025-03-14 RX ADMIN — OSELTAMIVIR PHOSPHATE 75 MG: 75 CAPSULE ORAL at 08:45

## 2025-03-14 RX ADMIN — BUPRENORPHINE HYDROCHLORIDE AND NALOXONE HYDROCHLORIDE DIHYDRATE 2 TABLET: 8; 2 TABLET SUBLINGUAL at 12:48

## 2025-03-14 RX ADMIN — AZITHROMYCIN 500 MG: 250 TABLET, FILM COATED ORAL at 20:40

## 2025-03-14 RX ADMIN — Medication 10 ML: at 20:41

## 2025-03-14 RX ADMIN — SALINE NASAL SPRAY 2 SPRAY: 1.5 SOLUTION NASAL at 18:35

## 2025-03-14 RX ADMIN — CITALOPRAM HYDROBROMIDE 20 MG: 20 TABLET ORAL at 08:45

## 2025-03-14 RX ADMIN — POTASSIUM CHLORIDE 10 MEQ: 750 TABLET, EXTENDED RELEASE ORAL at 08:45

## 2025-03-14 RX ADMIN — IPRATROPIUM BROMIDE AND ALBUTEROL SULFATE 3 ML: .5; 3 SOLUTION RESPIRATORY (INHALATION) at 20:34

## 2025-03-14 RX ADMIN — GUAIFENESIN 1200 MG: 600 TABLET, MULTILAYER, EXTENDED RELEASE ORAL at 20:40

## 2025-03-14 RX ADMIN — IPRATROPIUM BROMIDE AND ALBUTEROL SULFATE 3 ML: .5; 3 SOLUTION RESPIRATORY (INHALATION) at 08:12

## 2025-03-14 RX ADMIN — ACETAMINOPHEN 325MG 650 MG: 325 TABLET ORAL at 11:46

## 2025-03-14 RX ADMIN — BUDESONIDE AND FORMOTEROL FUMARATE DIHYDRATE 2 PUFF: 160; 4.5 AEROSOL RESPIRATORY (INHALATION) at 08:12

## 2025-03-14 RX ADMIN — NICOTINE 1 PATCH: 14 PATCH TRANSDERMAL at 20:41

## 2025-03-14 RX ADMIN — IPRATROPIUM BROMIDE AND ALBUTEROL SULFATE 3 ML: .5; 3 SOLUTION RESPIRATORY (INHALATION) at 11:49

## 2025-03-14 RX ADMIN — FUROSEMIDE 40 MG: 40 TABLET ORAL at 08:45

## 2025-03-14 RX ADMIN — BUDESONIDE AND FORMOTEROL FUMARATE DIHYDRATE 2 PUFF: 160; 4.5 AEROSOL RESPIRATORY (INHALATION) at 20:35

## 2025-03-14 RX ADMIN — ACETAMINOPHEN 325MG 650 MG: 325 TABLET ORAL at 20:40

## 2025-03-14 RX ADMIN — OSELTAMIVIR PHOSPHATE 75 MG: 75 CAPSULE ORAL at 20:40

## 2025-03-14 RX ADMIN — GUAIFENESIN 1200 MG: 600 TABLET, MULTILAYER, EXTENDED RELEASE ORAL at 08:45

## 2025-03-14 RX ADMIN — ONDANSETRON 4 MG: 2 INJECTION, SOLUTION INTRAMUSCULAR; INTRAVENOUS at 10:37

## 2025-03-14 NOTE — PROGRESS NOTES
ED OBSERVATION PROGRESS/DISCHARGE SUMMARY    Date of Admission: 3/12/2025   LOS: 0 days   PCP: Provider, No Known    Working diagnosis: influenza A, COPD exacerbation, history of lung cancer in remission being treated with Keytruda, hypokalemia, hypoxemia, bipolar disorder      Subjective     Hospital Outcome:     Katharina Torres is a 64 y.o. female who was admitted to the ED observation unit for an acute COPD exacerbation.  In the ED patient was found to have influenza A.  She reports she has had an ongoing productive cough for roughly 3 months.  For the last 36 hours she has had significant body aches.  There is no associated chest pain, palpitations or GI symptoms.  Pulmonology is following.     Pulmonology recommends holding on glucocorticoids for now as it has shown increased mortality in patients with influenza.  Continue with Tamiflu and continue bronchodilators.  Tobacco cessation discussed yet.     3/14/2025: No acute events overnight.  Patient weaned to 2 L of oxygen per nasal cannula.  Pulmonology continues to follow.    4:00 PM.  Patient has been in the ED observation unit for 50 hours.  She continues to require 3 L of O2.  She is being treated with Tamiflu, Zithromax, DuoNebs every 4, albuterol mini nebs as needed, Mucinex 1200 mg twice daily with continuous monitoring.  At this point, she is clearly going to need more time than ED observation will allow.  Will place call to the hospitalist to discuss admission.    4:27 PM.  Discussed patient's case with Dr. Parker.  To admit to her care for further management.  Pulmonology continues to recommend the above but have stopped the steroids for now due to no wheeze.   Should patient start to wheeze again okay to restart steroids.  To continue with other scheduled medication above.  Pulmonology continues to follow.    ROS:  General: Chills and myalgias.  Respiratory: Shortness of breath  Cardiovascular: no chest pain, palpitations  Abdomen: No abdominal pain,  nausea, vomiting, or diarrhea  Neurologic: No focal weakness    Objective   Physical Exam:  I have reviewed the vital signs.  Temp:  [98.2 °F (36.8 °C)-98.6 °F (37 °C)] 98.6 °F (37 °C)  Heart Rate:  [72-94] 83  Resp:  [18-20] 18  BP: ()/(66-82) 106/66  General Appearance:    Alert, cooperative, no distress  Head:    Normocephalic, atraumatic  Eyes:    Sclerae anicteric  Neck:   Supple, no mass  Lungs: Clear to auscultation bilaterally, respirations unlabored  Heart: Regular rate and rhythm, S1 and S2 normal, no murmur, rub or gallop  Abdomen:  Soft, nontender, bowel sounds active, nondistended  Extremities: No clubbing, cyanosis, or edema to lower extremities  Pulses:  2+ and symmetric in distal lower extremities  Skin: No rashes   Neurologic: Oriented x3, Normal strength to extremities    Results Review:    I have reviewed the labs, radiology results and diagnostic studies.    Results from last 7 days   Lab Units 03/13/25  0348   WBC 10*3/mm3 8.52   HEMOGLOBIN g/dL 14.1   HEMATOCRIT % 44.8   PLATELETS 10*3/mm3 182     Results from last 7 days   Lab Units 03/13/25  0348 03/12/25  1654   SODIUM mmol/L 142 141   POTASSIUM mmol/L 4.2 3.2*   CHLORIDE mmol/L 105 101   CO2 mmol/L 24.7 25.3   BUN mg/dL 5* 6*   CREATININE mg/dL 0.45* 0.62   CALCIUM mg/dL 8.8 9.0   BILIRUBIN mg/dL  --  0.5   ALK PHOS U/L  --  89   ALT (SGPT) U/L  --  20   AST (SGOT) U/L  --  26   GLUCOSE mg/dL 123* 91     Imaging Results (Last 24 Hours)       ** No results found for the last 24 hours. **            I have reviewed the medications.     Discharge Medications        ASK your doctor about these medications        Instructions Start Date   albuterol 1.25 MG/3ML nebulizer solution  Commonly known as: ACCUNEB   1.25 mg, Nebulization, Every 6 Hours PRN      albuterol sulfate  (90 Base) MCG/ACT inhaler  Commonly known as: PROVENTIL HFA;VENTOLIN HFA;PROAIR HFA   INHALE 2 PUFFS BY MOUTH EVERY 6 HOURS AS NEEDED FOR SHORTNESS OF AIR       ALPRAZolam 1 MG tablet  Commonly known as: XANAX   1 mg, Oral, 3 Times Daily      atorvastatin 40 MG tablet  Commonly known as: LIPITOR   40 mg, Oral, Daily      buprenorphine-naloxone 8-2 MG per SL tablet  Commonly known as: SUBOXONE   1 tablet, Daily      citalopram 20 MG tablet  Commonly known as: CeleXA   20 mg, Daily      docusate sodium 100 MG capsule  Commonly known as: COLACE   100 mg, Oral, 2 Times Daily PRN      furosemide 40 MG tablet  Commonly known as: LASIX   80 mg, Oral, 2 Times Daily      hydrOXYzine 25 MG tablet  Commonly known as: ATARAX   25 mg, Oral, 3 Times Daily PRN      levothyroxine 175 MCG tablet  Commonly known as: SYNTHROID, LEVOTHROID   150 mcg, Daily      lisinopril 20 MG tablet  Commonly known as: PRINIVIL,ZESTRIL   20 mg, Oral, Daily      metFORMIN 500 MG tablet  Commonly known as: GLUCOPHAGE   500 mg, Oral, 2 Times Daily With Meals      ondansetron 4 MG tablet  Commonly known as: ZOFRAN   4 mg, Every 8 Hours PRN      potassium chloride 10 MEQ CR tablet   10 mEq, Oral, Daily      predniSONE 20 MG tablet  Commonly known as: DELTASONE   60 mg, Oral, Daily      traZODone 150 MG tablet  Commonly known as: DESYREL   150 mg, Nightly              ---------------------------------------------------------------------------------------------  Assessment & Plan   Assessment/Problem List    Influenza A      Plan:    Influenza A  COPD exacerbation  History of lung cancer-currently in remission being treated with Keytruda  -Cardiac monitor  -Vital signs every 4 hours  -Continuous pulse ox  -Mucinex 1200 mg p.o. twice daily  -DuoNebs every 4 hours  -Albuterol mini nebs as needed  -Incentive spirometer every 4 hours while awake  -Tamiflu 75 mg p.o. twice daily for 5 days  -Zithromax 500 mg p.o. nightly x 3 days  -Pulmonology following with plan above  -Admission to the hospitalist for further management.     Tobacco abuse  -NicoDerm patch  -As needed Nicorette gum     Hypothyroidism  -Continue home  dose Synthroid    Disposition: Admission    Follow-up after Discharge: Pending    This note will serve as a progress note    Justice Alanis III, PA 03/14/25 16:34 EDT    I have worn appropriate PPE during this patient encounter, sanitized my hands both with entering and exiting patient's room.      52 minutes has been spent by [facility] providers in the care of this patient while under observation status

## 2025-03-14 NOTE — PROGRESS NOTES
LOU FLORENCE Attestation Note    SHARED VISIT: This visit was performed by BOTH a physician and an APC. The substantive portion of the medical decision making was performed by this attesting physician who made or approved the management plan and takes responsibility for patient management. All studies in the APC note (if performed) were independently interpreted by me.     History:  Patient with history of COPD is admitted to observation unit for further management of influenza A illness.  This morning she is feeling a little bit better.  She had a significant coughing spell overnight and was given a dose of Hycodan.  That has provided some relief this morning.  She is having some occasional pain in the lower chest and in the ribs with her coughing and deep breathing.  But no pain at rest.  No fevers overnight.    Physical Exam:  General: No acute distress.  HENT: NCAT  Eyes: no scleral icterus.  Neck: Painless range of motion  CV: Pink warm and well-perfused throughout  Respiratory: Normal work of breathing.  Breath sounds are diminished at the bilateral bases.  There are faint wheezes on the right side.  No wheezes on the left.  No stridor.  Occasional cough with deep breathing.  Abdomen: soft, no focal tenderness or rigidity  Musculoskeletal: no deformity.  Neuro: Alert, speech fluent and easily intelligible  Skin: warm, dry.    Assessment and Plan:  Influenza A/COPD exacerbation: Continue DuoNeb therapy.  Continue Tamiflu.  Steroids as recommended by pulmonology.  Continuing supplemental nasal cannula oxygen.  Would like to try to wean her back down to room air if possible today.  Consider checking ambulatory O2 sat if she continues to improve.  Pulmonology following.    Bilateral calf pain: Presumed musculoskeletal.  Duplex venous ultrasound negative for DVT bilaterally.    Tobacco abuse: NicoDerm patch    Hypothyroidism: Continue home dose Synthroid.

## 2025-03-14 NOTE — PLAN OF CARE
Goal Outcome Evaluation: pt continues to be monitored for flu symptoms and awaiting bed transfer. Pt has had consistent productive cough with thick sputum. Pt has remained in the high 80's-low 90's on 3L. See mar for pain interventions for generalized aching as well as aching in her calves. Nebulizer treatments and inhalers continued per RT. Steroid discontinued per pulmonology. Pt has nicotine patch in place and has denied need for nicotine gum. Pt has special needs grand-daughter at bedside who has been very attentive to the patient. Droplet precautions maintained. Pt is A/O x4, is agreeable to the plan of care and verbalizes understanding. Pt has call light within reach.       Problem: Adult Inpatient Plan of Care  Goal: Plan of Care Review  Outcome: Progressing  Goal: Patient-Specific Goal (Individualized)  Outcome: Progressing  Goal: Absence of Hospital-Acquired Illness or Injury  Outcome: Progressing  Intervention: Identify and Manage Fall Risk  Recent Flowsheet Documentation  Taken 3/14/2025 1755 by Jane Jackson RN  Safety Promotion/Fall Prevention:   room organization consistent   safety round/check completed  Taken 3/14/2025 1538 by Jane Jackson RN  Safety Promotion/Fall Prevention:   room organization consistent   safety round/check completed  Taken 3/14/2025 1330 by Jane Jackson RN  Safety Promotion/Fall Prevention:   room organization consistent   safety round/check completed  Taken 3/14/2025 1134 by Jane Jackson RN  Safety Promotion/Fall Prevention:   room organization consistent   safety round/check completed  Taken 3/14/2025 1035 by Jane Jackson RN  Safety Promotion/Fall Prevention:   room organization consistent   safety round/check completed  Taken 3/14/2025 0845 by Jane Jackson RN  Safety Promotion/Fall Prevention:   room organization consistent   safety round/check completed  Taken 3/14/2025 0745 by Jane Jackson RN  Safety Promotion/Fall Prevention:   room  organization consistent   safety round/check completed  Intervention: Prevent Skin Injury  Recent Flowsheet Documentation  Taken 3/14/2025 1755 by Jane Jackson RN  Body Position: position changed independently  Taken 3/14/2025 1538 by Jane Jackson RN  Body Position: position changed independently  Taken 3/14/2025 1330 by Jane Jackson RN  Body Position: position changed independently  Taken 3/14/2025 1134 by Jane Jackson RN  Body Position: position changed independently  Taken 3/14/2025 1035 by Jane Jackson RN  Body Position: position changed independently  Taken 3/14/2025 0845 by Jane Jackson RN  Body Position: position changed independently  Taken 3/14/2025 0745 by Jane Jackson RN  Body Position: position changed independently  Intervention: Prevent and Manage VTE (Venous Thromboembolism) Risk  Recent Flowsheet Documentation  Taken 3/14/2025 0845 by Jane Jackson RN  VTE Prevention/Management: patient refused intervention  Intervention: Prevent Infection  Recent Flowsheet Documentation  Taken 3/14/2025 1755 by Jane Jackson RN  Infection Prevention: single patient room provided  Taken 3/14/2025 1538 by Jane Jackson RN  Infection Prevention: single patient room provided  Taken 3/14/2025 1330 by Jane Jackson RN  Infection Prevention: single patient room provided  Taken 3/14/2025 1134 by Jane Jackson RN  Infection Prevention: single patient room provided  Taken 3/14/2025 1035 by Jane Jackson RN  Infection Prevention: single patient room provided  Taken 3/14/2025 0845 by Jane Jackson RN  Infection Prevention: single patient room provided  Taken 3/14/2025 0745 by Jane Jackson RN  Infection Prevention: single patient room provided  Goal: Optimal Comfort and Wellbeing  Outcome: Progressing  Intervention: Monitor Pain and Promote Comfort  Recent Flowsheet Documentation  Taken 3/14/2025 1146 by Jane Jcakson RN  Pain Management Interventions: pain  medication given  Intervention: Provide Person-Centered Care  Recent Flowsheet Documentation  Taken 3/14/2025 1330 by Jane Jackson RN  Trust Relationship/Rapport:   care explained   choices provided  Taken 3/14/2025 0845 by Jane Jackson RN  Trust Relationship/Rapport:   care explained   choices provided  Goal: Readiness for Transition of Care  Outcome: Progressing     Problem: Comorbidity Management  Goal: Maintenance of Asthma Control  Outcome: Progressing  Intervention: Maintain Asthma Symptom Control  Recent Flowsheet Documentation  Taken 3/14/2025 1755 by Jane Jackson RN  Medication Review/Management: medications reviewed  Taken 3/14/2025 1538 by Jane Jackson RN  Medication Review/Management: medications reviewed  Taken 3/14/2025 1330 by Jane Jackson RN  Medication Review/Management: medications reviewed  Taken 3/14/2025 1035 by Jane Jackson RN  Medication Review/Management: medications reviewed  Taken 3/14/2025 0845 by Jane Jackson RN  Medication Review/Management: medications reviewed  Taken 3/14/2025 0745 by Jane Jackson RN  Medication Review/Management: medications reviewed  Goal: Maintenance of COPD Symptom Control  Outcome: Progressing  Intervention: Maintain COPD (Chronic Obstructive Pulmonary Disease) Symptom Control  Recent Flowsheet Documentation  Taken 3/14/2025 1755 by Jane Jackson RN  Medication Review/Management: medications reviewed  Taken 3/14/2025 1538 by Jane Jackson RN  Medication Review/Management: medications reviewed  Taken 3/14/2025 1330 by Jane Jackson RN  Breathing Techniques/Airway Clearance: deep/controlled cough encouraged  Medication Review/Management: medications reviewed  Taken 3/14/2025 1035 by Jane Jackson RN  Medication Review/Management: medications reviewed  Taken 3/14/2025 0845 by Jane Jackson RN  Breathing Techniques/Airway Clearance: deep/controlled cough encouraged  Medication Review/Management: medications  reviewed  Taken 3/14/2025 0745 by Jane Jackson RN  Breathing Techniques/Airway Clearance: deep/controlled cough encouraged  Medication Review/Management: medications reviewed     Problem: Breathing Pattern Ineffective  Goal: Effective Breathing Pattern  Outcome: Progressing  Intervention: Promote Improved Breathing Pattern  Recent Flowsheet Documentation  Taken 3/14/2025 1755 by Jane Jackson RN  Head of Bed ([facility]) Positioning: HOB elevated  Taken 3/14/2025 1538 by Jane Jackson RN  Head of Bed ([facility]) Positioning: HOB elevated  Taken 3/14/2025 1330 by Jane Jackson RN  Head of Bed ([facility]) Positioning: HOB elevated  Airway/Ventilation Management: oxygen therapy provided  Breathing Techniques/Airway Clearance: deep/controlled cough encouraged  Taken 3/14/2025 1134 by Jane Jackson RN  Head of Bed ([facility]) Positioning: HOB elevated  Taken 3/14/2025 1035 by Jane Jackson RN  Head of Bed ([facility]) Positioning: HOB elevated  Taken 3/14/2025 0845 by Jane Jackson RN  Head of Bed ([facility]) Positioning: HOB elevated  Airway/Ventilation Management: oxygen therapy provided  Breathing Techniques/Airway Clearance: deep/controlled cough encouraged  Taken 3/14/2025 0745 by Jane Jackson RN  Head of Bed ([facility]) Positioning: HOB elevated  Airway/Ventilation Management: oxygen therapy provided  Breathing Techniques/Airway Clearance: deep/controlled cough encouraged     Problem: Airway Clearance Ineffective  Goal: Effective Airway Clearance  Outcome: Progressing  Intervention: Promote Airway Secretion Clearance  Recent Flowsheet Documentation  Taken 3/14/2025 1755 by Jane Jackson RN  Activity Management: up ad zeyad  Taken 3/14/2025 1538 by Jane Jackson RN  Activity Management: up ad zeyad  Taken 3/14/2025 1330 by Jane Jackson RN  Activity Management: up ad zeyad  Breathing Techniques/Airway Clearance: deep/controlled cough encouraged  Taken 3/14/2025 1134 by Jane Jackson RN  Activity Management: sitting,  edge of bed  Taken 3/14/2025 1035 by Jane Jackson RN  Activity Management: activity encouraged  Taken 3/14/2025 0845 by Jane Jackson RN  Activity Management: activity encouraged  Breathing Techniques/Airway Clearance: deep/controlled cough encouraged  Cough And Deep Breathing: done independently per patient  Taken 3/14/2025 0745 by Jane Jackson RN  Activity Management: activity encouraged  Breathing Techniques/Airway Clearance: deep/controlled cough encouraged     Problem: Fall Injury Risk  Goal: Absence of Fall and Fall-Related Injury  Outcome: Progressing  Intervention: Identify and Manage Contributors  Recent Flowsheet Documentation  Taken 3/14/2025 1755 by Jane Jackson RN  Medication Review/Management: medications reviewed  Taken 3/14/2025 1538 by Jane Jackson RN  Medication Review/Management: medications reviewed  Taken 3/14/2025 1330 by Jane Jackson RN  Medication Review/Management: medications reviewed  Taken 3/14/2025 1035 by Jane Jackson RN  Medication Review/Management: medications reviewed  Taken 3/14/2025 0845 by Jane Jackson RN  Medication Review/Management: medications reviewed  Taken 3/14/2025 0745 by Jane Jackson RN  Medication Review/Management: medications reviewed  Intervention: Promote Injury-Free Environment  Recent Flowsheet Documentation  Taken 3/14/2025 1755 by Jane Jackson RN  Safety Promotion/Fall Prevention:   room organization consistent   safety round/check completed  Taken 3/14/2025 1538 by Jane Jackson RN  Safety Promotion/Fall Prevention:   room organization consistent   safety round/check completed  Taken 3/14/2025 1330 by Jane Jackson RN  Safety Promotion/Fall Prevention:   room organization consistent   safety round/check completed  Taken 3/14/2025 1134 by Jane Jackson RN  Safety Promotion/Fall Prevention:   room organization consistent   safety round/check completed  Taken 3/14/2025 1035 by Jane Jackson  RN  Safety Promotion/Fall Prevention:   room organization consistent   safety round/check completed  Taken 3/14/2025 0845 by Jane Jackson, RN  Safety Promotion/Fall Prevention:   room organization consistent   safety round/check completed  Taken 3/14/2025 0745 by Jane Jackson, RN  Safety Promotion/Fall Prevention:   room organization consistent   safety round/check completed

## 2025-03-14 NOTE — PLAN OF CARE
Goal Outcome Evaluation:         Intermittent coughing throughout the night, productive and congested sounding. Lungs have improved in comparison to the previous night. Lowered O2 from 3 to 2 liters via NC, NSR, A+Ox4, VSS. Pulmonology consulted, CTM.

## 2025-03-15 PROCEDURE — 94664 DEMO&/EVAL PT USE INHALER: CPT

## 2025-03-15 PROCEDURE — 94799 UNLISTED PULMONARY SVC/PX: CPT

## 2025-03-15 PROCEDURE — 94761 N-INVAS EAR/PLS OXIMETRY MLT: CPT

## 2025-03-15 PROCEDURE — 63710000001 ONDANSETRON ODT 4 MG TABLET DISPERSIBLE

## 2025-03-15 RX ORDER — DEXTROMETHORPHAN POLISTIREX 30 MG/5ML
60 SUSPENSION ORAL EVERY 12 HOURS SCHEDULED
Status: DISCONTINUED | OUTPATIENT
Start: 2025-03-15 | End: 2025-03-16 | Stop reason: HOSPADM

## 2025-03-15 RX ORDER — ATORVASTATIN CALCIUM 20 MG/1
40 TABLET, FILM COATED ORAL DAILY
Status: DISCONTINUED | OUTPATIENT
Start: 2025-03-15 | End: 2025-03-16 | Stop reason: HOSPADM

## 2025-03-15 RX ORDER — BENZONATATE 100 MG/1
200 CAPSULE ORAL 3 TIMES DAILY
Status: DISCONTINUED | OUTPATIENT
Start: 2025-03-15 | End: 2025-03-16 | Stop reason: HOSPADM

## 2025-03-15 RX ADMIN — POTASSIUM CHLORIDE 10 MEQ: 750 TABLET, EXTENDED RELEASE ORAL at 11:10

## 2025-03-15 RX ADMIN — Medication 10 ML: at 20:41

## 2025-03-15 RX ADMIN — IPRATROPIUM BROMIDE AND ALBUTEROL SULFATE 3 ML: .5; 3 SOLUTION RESPIRATORY (INHALATION) at 10:35

## 2025-03-15 RX ADMIN — IPRATROPIUM BROMIDE AND ALBUTEROL SULFATE 3 ML: .5; 3 SOLUTION RESPIRATORY (INHALATION) at 14:50

## 2025-03-15 RX ADMIN — LEVOTHYROXINE SODIUM 175 MCG: 0.03 TABLET ORAL at 12:10

## 2025-03-15 RX ADMIN — BENZONATATE 200 MG: 100 CAPSULE ORAL at 20:40

## 2025-03-15 RX ADMIN — ACETAMINOPHEN 325MG 650 MG: 325 TABLET ORAL at 20:40

## 2025-03-15 RX ADMIN — Medication 10 ML: at 12:12

## 2025-03-15 RX ADMIN — DEXTROMETHORPHAN 60 MG: 30 SUSPENSION, EXTENDED RELEASE ORAL at 20:40

## 2025-03-15 RX ADMIN — BUPRENORPHINE HYDROCHLORIDE AND NALOXONE HYDROCHLORIDE DIHYDRATE 2 TABLET: 8; 2 TABLET SUBLINGUAL at 12:10

## 2025-03-15 RX ADMIN — GUAIFENESIN 1200 MG: 600 TABLET, MULTILAYER, EXTENDED RELEASE ORAL at 20:40

## 2025-03-15 RX ADMIN — GUAIFENESIN 1200 MG: 600 TABLET, MULTILAYER, EXTENDED RELEASE ORAL at 11:10

## 2025-03-15 RX ADMIN — ONDANSETRON 4 MG: 4 TABLET, ORALLY DISINTEGRATING ORAL at 20:51

## 2025-03-15 RX ADMIN — IPRATROPIUM BROMIDE AND ALBUTEROL SULFATE 3 ML: .5; 3 SOLUTION RESPIRATORY (INHALATION) at 07:09

## 2025-03-15 RX ADMIN — CITALOPRAM HYDROBROMIDE 20 MG: 20 TABLET ORAL at 11:10

## 2025-03-15 RX ADMIN — BUDESONIDE AND FORMOTEROL FUMARATE DIHYDRATE 2 PUFF: 160; 4.5 AEROSOL RESPIRATORY (INHALATION) at 07:13

## 2025-03-15 RX ADMIN — OSELTAMIVIR PHOSPHATE 75 MG: 75 CAPSULE ORAL at 11:10

## 2025-03-15 RX ADMIN — OSELTAMIVIR PHOSPHATE 75 MG: 75 CAPSULE ORAL at 20:40

## 2025-03-15 RX ADMIN — ATORVASTATIN CALCIUM 40 MG: 20 TABLET, FILM COATED ORAL at 12:09

## 2025-03-15 RX ADMIN — IPRATROPIUM BROMIDE AND ALBUTEROL SULFATE 3 ML: .5; 3 SOLUTION RESPIRATORY (INHALATION) at 21:11

## 2025-03-15 RX ADMIN — FUROSEMIDE 40 MG: 40 TABLET ORAL at 11:10

## 2025-03-15 RX ADMIN — ACETAMINOPHEN 325MG 650 MG: 325 TABLET ORAL at 04:40

## 2025-03-15 RX ADMIN — HYDROCODONE BITARTRATE AND HOMATROPINE METHYLBROMIDE ORAL SOLUTION 5 ML: 5; 1.5 LIQUID ORAL at 04:40

## 2025-03-15 NOTE — CONSULTS
CONSULT NOTE    INTERNAL MEDICINE   [facility]       Patient Identification:  Name: Katharina Torres  Age: 64 y.o.  Sex: female  :  1960  MRN: [id number]             Date of Consultation: 25          Primary Care Physician: Provider, No Known                               Requesting Physician: dr ellis  Reason for Consultation:assuming care    Chief Complaint:  64 year old female presented to the ED with shortness of breath; she was diagnosed with influenza and sent to the observation unit; she also has a copd exacerbation; she was seen by pulmonology and admission was requested due to lack of progress; she continues to smoke    History of Present Illness:   As above      Past Medical History:  Past Medical History:   Diagnosis Date    Addiction     ALCOHOL LAST USED 1 YEAR AGO, AMPHETAMINES LAST USED 22, PILLS PERCOCET LAST USED 2022    Afib     APPROX 1 YEAR AGO - [facility]    Allergic     Anxiety     Arthritis     KNEE, RIGHT KNEE    Bipolar 1 disorder     Cancer     LUNG CA/ BRAIN- RADIATION    COPD (chronic obstructive pulmonary disease)     Depression     Diabetes mellitus     DVT, bilateral lower limbs     Hyperlipidemia     Hypertension     Pneumonia     Thyroid disease      Past Surgical History:  Past Surgical History:   Procedure Laterality Date    CARPAL TUNNEL RELEASE      HEEL SPUR SURGERY Left     VENOUS ACCESS DEVICE (PORT) INSERTION      FOR CHEMO      Home Meds:  Medications Prior to Admission   Medication Sig Dispense Refill Last Dose/Taking    albuterol (ACCUNEB) 1.25 MG/3ML nebulizer solution Take 3 mL by nebulization Every 6 (Six) Hours As Needed for Wheezing. 360 mL 0 3/12/2025 Evening    albuterol sulfate  (90 Base) MCG/ACT inhaler INHALE 2 PUFFS BY MOUTH EVERY 6 HOURS AS NEEDED FOR SHORTNESS OF AIR 8.5 g 10 3/12/2025 Morning    atorvastatin (LIPITOR) 40 MG tablet Take 1 tablet by mouth Daily. 90 tablet 1 3/12/2025 Morning     buprenorphine-naloxone (SUBOXONE) 8-2 MG per SL tablet Place 1 tablet under the tongue Daily. Instructed to stay on per Dr. Haynes and Dr. Shaw   3/12/2025 Morning    citalopram (CeleXA) 20 MG tablet Take 1 tablet by mouth Daily.   3/12/2025 Morning    furosemide (LASIX) 40 MG tablet Take 2 tablets by mouth 2 (Two) Times a Day. 180 tablet 1 3/12/2025 Morning    levothyroxine (SYNTHROID, LEVOTHROID) 175 MCG tablet Take 150 mcg by mouth Daily.   3/12/2025 Morning    metFORMIN (GLUCOPHAGE) 500 MG tablet Take 1 tablet by mouth 2 (Two) Times a Day With Meals. 180 tablet 1 3/12/2025 Morning    ondansetron (ZOFRAN) 4 MG tablet Take 1 tablet by mouth Every 8 (Eight) Hours As Needed for Nausea or Vomiting.   3/11/2025 Bedtime    potassium chloride 10 MEQ CR tablet Take 1 tablet by mouth Daily. 90 tablet 1 3/12/2025 Morning    traZODone (DESYREL) 150 MG tablet Take 1 tablet by mouth Every Night.   3/11/2025 Bedtime    ALPRAZolam (XANAX) 1 MG tablet Take 1 tablet by mouth 3 (Three) Times a Day.   More than a month    docusate sodium (COLACE) 100 MG capsule Take 1 capsule by mouth 2 (Two) Times a Day As Needed for Constipation.   More than a month    hydrOXYzine (ATARAX) 25 MG tablet Take 1 tablet by mouth 3 (Three) Times a Day As Needed for Itching.   More than a month    lisinopril (PRINIVIL,ZESTRIL) 20 MG tablet Take 1 tablet by mouth Daily. 90 tablet 1 Unknown    predniSONE (DELTASONE) 20 MG tablet Take 3 tablets by mouth Daily. (Patient taking differently: Take 60 mg by mouth Daily. PT HAS NOT STARTED TAKING) 15 tablet 0      Current Meds:     Current Facility-Administered Medications:     acetaminophen (TYLENOL) tablet 650 mg, 650 mg, Oral, Q4H PRN, 650 mg at 03/14/25 2040 **OR** acetaminophen (TYLENOL) 160 MG/5ML oral solution 650 mg, 650 mg, Oral, Q4H PRN **OR** acetaminophen (TYLENOL) suppository 650 mg, 650 mg, Rectal, Q4H PRN, Howie, Bette S, HENRY    albuterol (PROVENTIL) nebulizer solution 0.083% 2.5 mg/3mL,  2.5 mg, Nebulization, Q6H PRN, Bette Denise APRN    sennosides-docusate (PERICOLACE) 8.6-50 MG per tablet 2 tablet, 2 tablet, Oral, BID PRN **AND** polyethylene glycol (MIRALAX) packet 17 g, 17 g, Oral, Daily PRN **AND** bisacodyl (DULCOLAX) EC tablet 5 mg, 5 mg, Oral, Daily PRN **AND** bisacodyl (DULCOLAX) suppository 10 mg, 10 mg, Rectal, Daily PRN, Bette Denise APRN    budesonide-formoterol (SYMBICORT) 160-4.5 MCG/ACT inhaler 2 puff, 2 puff, Inhalation, BID - RT, Donald East MD, 2 puff at 03/14/25 2035    buprenorphine-naloxone (SUBOXONE) 8-2 MG per SL tablet 2 tablet, 2 tablet, Sublingual, Daily, Justice Alanis III, PA, 2 tablet at 03/14/25 1248    calcium carbonate (TUMS) chewable tablet 500 mg (200 mg elemental), 2 tablet, Oral, TID PRN, Bette Denise APRN    Calcium Replacement - Follow Nurse / BPA Driven Protocol, , Not Applicable, PRN, Bette Denise APRN    citalopram (CeleXA) tablet 20 mg, 20 mg, Oral, Daily, Justice Alanis III, PA, 20 mg at 03/14/25 0845    furosemide (LASIX) tablet 40 mg, 40 mg, Oral, Daily, Bette Denise APRN, 40 mg at 03/14/25 0845    guaiFENesin (MUCINEX) 12 hr tablet 1,200 mg, 1,200 mg, Oral, Q12H, Bette Denise APRN, 1,200 mg at 03/14/25 2040    HYDROcodone Bit-Homatrop MBr (HYCODAN) 5-1.5 MG/5ML solution 5 mL, 5 mL, Oral, Q6H PRN, Bette Denise APRN, 5 mL at 03/14/25 0621    ipratropium-albuterol (DUO-NEB) nebulizer solution 3 mL, 3 mL, Nebulization, 4x Daily - RT, Bette Denise APRN, 3 mL at 03/14/25 2034    Magnesium Standard Dose Replacement - Follow Nurse / BPA Driven Protocol, , Not Applicable, PRN, Bette Denise, HENRY    nicotine (NICODERM CQ) 14 MG/24HR patch 1 patch, 1 patch, Transdermal, Nightly, Afshan Buckner APRN, 1 patch at 03/14/25 2041    nicotine polacrilex (NICORETTE) gum 2 mg, 2 mg, Mouth/Throat, Q1H PRN, Afshan Buckner APRN, 2 mg at 03/13/25 2022    nitroglycerin (NITROSTAT) SL tablet 0.4 mg, 0.4 mg, Sublingual,  Q5 Min PRN, Bette Denise S, APRN    ondansetron ODT (ZOFRAN-ODT) disintegrating tablet 4 mg, 4 mg, Oral, Q6H PRN **OR** ondansetron (ZOFRAN) injection 4 mg, 4 mg, Intravenous, Q6H PRN, Bette Denise S, APRN, 4 mg at 03/14/25 1037    oseltamivir (TAMIFLU) capsule 75 mg, 75 mg, Oral, Q12H, HowieHarmony brodyhy S, APRN, 75 mg at 03/14/25 2040    oxymetazoline (AFRIN) nasal spray 2 spray, 2 spray, Each Nare, BID, Donald East MD    Phosphorus Replacement - Follow Nurse / BPA Driven Protocol, , Not Applicable, PRN, Harmony Denisehy S, APRN    potassium chloride (KLOR-CON M10) CR tablet 10 mEq, 10 mEq, Oral, Daily, Howie, Bette S, APRN, 10 mEq at 03/14/25 0845    Potassium Replacement - Follow Nurse / BPA Driven Protocol, , Not Applicable, PRN, Harmony Denisehy S, APRN    [COMPLETED] Insert Peripheral IV, , , Once **AND** sodium chloride 0.9 % flush 10 mL, 10 mL, Intravenous, PRN, Manuel Sabillon MD    sodium chloride 0.9 % flush 10 mL, 10 mL, Intravenous, Q12H, HowieHarmony brodyhy S, APRN, 10 mL at 03/14/25 2041    sodium chloride 0.9 % flush 10 mL, 10 mL, Intravenous, PRN, Howie Bette S, APRN    sodium chloride 0.9 % infusion 40 mL, 40 mL, Intravenous, PRN, Howie Bette S, APRN    sodium chloride nasal spray 2 spray, 2 spray, Each Nare, PRN, Donald East MD, 2 spray at 03/14/25 1835  Allergies:  No Known Allergies  Social History:   Social History     Socioeconomic History    Marital status:    Tobacco Use    Smoking status: Every Day     Current packs/day: 1.50     Average packs/day: 1.5 packs/day for 38.4 years (57.6 ttl pk-yrs)     Types: Cigarettes     Start date: 10/23/1986    Smokeless tobacco: Never   Vaping Use    Vaping status: Never Used   Substance and Sexual Activity    Alcohol use: Not Currently    Drug use: Not Currently    Sexual activity: Not Currently     Partners: Male     Birth control/protection: None     Family History:  Family History   Problem Relation Age of Onset    Heart disease Mother      "Diabetes Mother     Cancer Father     Stroke Father     Heart disease Father     Diabetes Father           Review of Systems  See history of present illness and past medical history.  Patient denies headache, dizziness, syncope, falls, trauma, change in vision, change in hearing, change in taste, changes in weight, changes in appetite, focal weakness, numbness, or paresthesia.  Patient denies chest pain, palpitations,  PND,   sinus pressure, rhinorrhea, epistaxis, hemoptysis, nausea, vomiting,hematemesis, diarrhea, constipation or hematochezia. Denies cold or heat intolerance, polydipsia, polyuria, polyphagia. Denies hematuria, pyuria, dysuria, hesitancy, frequency or urgency. Denies consumption of raw and under cooked meats foods or change in water source.  Denies fever, chills, sweats, night sweats.       Vitals:   /80 (BP Location: Right arm, Patient Position: Lying)   Pulse 75   Temp 98.4 °F (36.9 °C) (Oral)   Resp 16   Ht 162.6 cm (64\")   Wt 83.9 kg (185 lb)   SpO2 95%   BMI 31.76 kg/m²   I/O: No intake or output data in the 24 hours ending 03/14/25 4319  Exam:  General Appearance:    Alert, cooperative, no distress, appears stated age; chronically ill appearing   Head:    Normocephalic, without obvious abnormality, atraumatic   Eyes:    PERRL, conjunctivae/corneas clear, EOM's intact, both eyes   Ears:    Normal external ear canals, both ears   Nose:   Nares normal, septum midline, mucosa normal, no drainage    or sinus tenderness   Throat:   Lips, tongue, gums normal; oral mucosa pink and moist   Neck:   Supple, symmetrical, trachea midline, no adenopathy;     thyroid:  no enlargement/tenderness/nodules; no carotid    bruit or JVD   Back:     Symmetric, no curvature, ROM normal, no CVA tenderness   Lungs:     Decreased breath sounds bilaterally, respirations unlabored   Chest Wall:    No tenderness or deformity    Heart:    Regular rate and rhythm, S1 and S2 normal, no murmur, rub   or gallop "   Abdomen:     Soft, nontender, bowel sounds active all four quadrants,     no masses, no hepatomegaly, no splenomegaly   Extremities:   Extremities normal, atraumatic, no cyanosis or edema                Data Review:  Labs in chart were reviewed.  WBC   Date Value Ref Range Status   03/14/2025 10.54 3.40 - 10.80 10*3/mm3 Final     Hemoglobin   Date Value Ref Range Status   03/14/2025 14.5 12.0 - 15.9 g/dL Final     Hematocrit   Date Value Ref Range Status   03/14/2025 44.8 34.0 - 46.6 % Final     Platelets   Date Value Ref Range Status   03/14/2025 187 140 - 450 10*3/mm3 Final     Sodium   Date Value Ref Range Status   03/14/2025 139 136 - 145 mmol/L Final     Potassium   Date Value Ref Range Status   03/14/2025 3.7 3.5 - 5.2 mmol/L Final     Chloride   Date Value Ref Range Status   03/14/2025 101 98 - 107 mmol/L Final     CO2   Date Value Ref Range Status   03/14/2025 26.0 22.0 - 29.0 mmol/L Final     BUN   Date Value Ref Range Status   03/14/2025 12 8 - 23 mg/dL Final     Creatinine   Date Value Ref Range Status   03/14/2025 0.62 0.57 - 1.00 mg/dL Final     Glucose   Date Value Ref Range Status   03/14/2025 94 65 - 99 mg/dL Final     Calcium   Date Value Ref Range Status   03/14/2025 8.5 (L) 8.6 - 10.5 mg/dL Final     AST (SGOT)   Date Value Ref Range Status   03/12/2025 26 1 - 32 U/L Final     ALT (SGPT)   Date Value Ref Range Status   03/12/2025 20 1 - 33 U/L Final     Alkaline Phosphatase   Date Value Ref Range Status   03/12/2025 89 39 - 117 U/L Final               Imaging Results (Last 7 Days)       Procedure Component Value Units Date/Time    CT Angiogram Chest Pulmonary Embolism [[id number]] Collected: 03/12/25 1916     Updated: 03/12/25 1931    Narrative:      CTA CHEST WITH IV CONTRAST     HISTORY: History of lung cancer, productive cough and dyspnea     COMPARISON: Chest CT 9/15/2022     TECHNIQUE: CT angiography was performed of the chest with axial images  as well as coronal and sagittal reformatted  MIP images provided  following administration of IV contrast. 3-D surface rendered reformats  were obtained of the pulmonary arteries and aorta. Radiation dose  reduction techniques were utilized, including automated exposure  control, and exposure modulation based on body size.     FINDINGS:     There is a region of somewhat patchy alveolar infiltrate in the  posterior medial right lower lobe, and there is some associated  bronchial wall thickening/bronchial narrowing. Somewhat similar though  less prominent findings are seen in the right middle lobe as well.     There is no dense pulmonary consolidation, pleural effusion or  pneumothorax.     The thoracic aorta is normal in caliber, and there are coronary  atherosclerotic vascular calcifications. There is no new or otherwise  suspicious mediastinal or hilar adenopathy or mass, though there are  chronic soft tissue changes in the right hilum with irregularity of the  right mainstem and upper lobe bronchus, unchanged, presumably related to  prior therapy.     Images of the upper abdomen show no acute abnormality; a mixed density  partially calcified right adrenal nodule is redemonstrated.  There is no  acute bony abnormality.     Bolus timing is good and there is no evidence of pulmonary embolism.       Impression:         Pulmonary arteries are well-opacified, and there is no evidence of  pulmonary embolism.     New areas of patchy alveolar type infiltrate in the posterior medial  right lower lobe, and to a lesser degree in the right middle lobe, with  associated bronchial thickening.           This report was finalized on 3/12/2025 7:27 PM by Dr. Immanuel Hunt M.D on Workstation: [id number]       XR Chest 1 View [[id number]] Collected: 03/12/25 1647     Updated: 03/12/25 1652    Narrative:      XR CHEST 1 VW-     HISTORY: Female who is 64 years-old, cough     TECHNIQUE: Frontal view of the chest     COMPARISON: 9/15/2022     FINDINGS: Right chest port appears  stable. The heart size is borderline.  Pulmonary vasculature is unremarkable. No focal pulmonary consolidation,  pleural effusion, or pneumothorax. No acute osseous process.       Impression:      No focal pulmonary consolidation. Follow-up as clinical  indications persist.     This report was finalized on 3/12/2025 4:48 PM by Dr. Jose Luis Gregorio M.D on Workstation: [id number]             Past Medical History:   Diagnosis Date    Addiction     ALCOHOL LAST USED 1 YEAR AGO, AMPHETAMINES LAST USED 9/21/22, PILLS PERCOCET LAST USED 06/2022    Afib     APPROX 1 YEAR AGO - [facility]    Allergic     Anxiety     Arthritis     KNEE, RIGHT KNEE    Bipolar 1 disorder     Cancer     LUNG CA/ BRAIN- RADIATION    COPD (chronic obstructive pulmonary disease)     Depression     Diabetes mellitus     DVT, bilateral lower limbs     Hyperlipidemia     Hypertension     Pneumonia     Thyroid disease        Assessment:  Active Hospital Problems    Diagnosis  POA    **Influenza A [J10.1]  Yes    COPD with acute exacerbation [J44.1]  Yes      Resolved Hospital Problems   No resolved problems to display.   Influenza a  Tobacco use  Hypertension  Hypothyroidism  Acute hypoxic respiratory failure    Plan:  Continue tamiflu  Wean oxygen as tolerated  Doppler legs and cta negative for clots  Trend labs  Notes reviewed  Will admit the patient  Thanks for involving us in her care  Patient is full code and daughter is peggy Garza Yves Parker MD   3/14/2025  22:19 EDT

## 2025-03-15 NOTE — PLAN OF CARE
Goal Outcome Evaluation: pt transferred to .   Pt left with all belongings and granddaughter at bedside.     Report called to  nurse.       Problem: Adult Inpatient Plan of Care  Goal: Plan of Care Review  Outcome: Progressing  Goal: Patient-Specific Goal (Individualized)  Outcome: Progressing  Goal: Absence of Hospital-Acquired Illness or Injury  Outcome: Progressing  Intervention: Identify and Manage Fall Risk  Recent Flowsheet Documentation  Taken 3/15/2025 0742 by Jane Jackson RN  Safety Promotion/Fall Prevention:   room organization consistent   safety round/check completed  Intervention: Prevent Skin Injury  Recent Flowsheet Documentation  Taken 3/15/2025 0742 by Jane Jackson RN  Body Position: position changed independently  Intervention: Prevent Infection  Recent Flowsheet Documentation  Taken 3/15/2025 0742 by Jane Jackson RN  Infection Prevention: single patient room provided  Goal: Optimal Comfort and Wellbeing  Outcome: Progressing  Goal: Readiness for Transition of Care  Outcome: Progressing     Problem: Comorbidity Management  Goal: Maintenance of Asthma Control  Outcome: Progressing  Goal: Maintenance of COPD Symptom Control  Outcome: Progressing     Problem: Breathing Pattern Ineffective  Goal: Effective Breathing Pattern  Outcome: Progressing  Intervention: Promote Improved Breathing Pattern  Recent Flowsheet Documentation  Taken 3/15/2025 0742 by Jane Jackson RN  Head of Bed (HOB) Positioning: HOB elevated     Problem: Airway Clearance Ineffective  Goal: Effective Airway Clearance  Outcome: Progressing  Intervention: Promote Airway Secretion Clearance  Recent Flowsheet Documentation  Taken 3/15/2025 0742 by Jane Jackson RN  Activity Management: up ad zeyad     Problem: Fall Injury Risk  Goal: Absence of Fall and Fall-Related Injury  Outcome: Progressing  Intervention: Promote Injury-Free Environment  Recent Flowsheet Documentation  Taken 3/15/2025 0742 by Renetta  Jane RN  Safety Promotion/Fall Prevention:   room organization consistent   safety round/check completed

## 2025-03-15 NOTE — PROGRESS NOTES
Name: Katharina Torres ADMIT: 3/12/2025   : 1960  PCP: Provider, No Known    MRN: [id number] LOS: 1 days   AGE/SEX: 64 y.o. female  ROOM: [facility]   Subjective   Chief Complaint   Patient presents with    Cough     Still with cough  On bronchodilators  On oxygen  Does not wear oxygen at home    ROS  No f/c  No n/v  No cp/palp  + soa/cough    Objective   Vital Signs  Temp:  [97.9 °F (36.6 °C)-98.5 °F (36.9 °C)] 97.9 °F (36.6 °C)  Heart Rate:  [66-84] 72  Resp:  [16-20] 16  BP: (109-155)/(74-91) 141/85  SpO2:  [92 %-100 %] 100 %  on  Flow (L/min) (Oxygen Therapy):  [2-3] 2;   Device (Oxygen Therapy): nasal cannula  Body mass index is 29.1 kg/m².    Physical Exam  Constitutional:       General: She is not in acute distress.  HENT:      Head: Normocephalic and atraumatic.   Eyes:      General: No scleral icterus.  Cardiovascular:      Rate and Rhythm: Regular rhythm.      Heart sounds: Normal heart sounds.   Pulmonary:      Effort: Pulmonary effort is normal. No respiratory distress.      Breath sounds: Rhonchi (minimal, cough during exam) present.   Abdominal:      General: There is no distension.      Palpations: Abdomen is soft.   Musculoskeletal:      Cervical back: Neck supple.   Neurological:      Mental Status: She is alert.   Psychiatric:         Behavior: Behavior normal.     Chronically ill     Results Review:       I reviewed the patient's new clinical results.  Results from last 7 days   Lab Units 25  1724 25  0348 25  1654   WBC 10*3/mm3 10.54 8.52 7.29   HEMOGLOBIN g/dL 14.5 14.1 14.4   PLATELETS 10*3/mm3 187 182 172     Results from last 7 days   Lab Units 25  1724 25  0348 25  1654   SODIUM mmol/L 139 142 141   POTASSIUM mmol/L 3.7 4.2 3.2*   CHLORIDE mmol/L 101 105 101   CO2 mmol/L 26.0 24.7 25.3   BUN mg/dL 12 5* 6*   CREATININE mg/dL 0.62 0.45* 0.62   GLUCOSE mg/dL 94 123* 91   Estimated Creatinine Clearance: 94.5 mL/min (by C-G formula based on SCr of 0.62  "mg/dL).  Results from last 7 days   Lab Units 03/12/25  1654   ALBUMIN g/dL 4.3   BILIRUBIN mg/dL 0.5   ALK PHOS U/L 89   AST (SGOT) U/L 26   ALT (SGPT) U/L 20     Results from last 7 days   Lab Units 03/14/25  1724 03/13/25  0348 03/12/25  1654   CALCIUM mg/dL 8.5* 8.8 9.0   ALBUMIN g/dL  --   --  4.3     Results from last 7 days   Lab Units 03/12/25  1654   LACTATE mmol/L 1.2       Coag   Results from last 7 days   Lab Units 03/12/25  1654   INR  1.26*   APTT seconds 27.7     HbA1C   Lab Results   Component Value Date    HGBA1C 6.70 (H) 09/26/2022    HGBA1C 5.2 07/18/2021    HGBA1C 5.5 04/19/2020     Infection   Results from last 7 days   Lab Units 03/12/25  1655   BLOODCX  No growth at 3 days  No growth at 3 days     Radiology(recent) No radiology results for the last day  No results found for: \"TROPONINT\", \"TROPONINI\", \"BNP\"  No components found for: \"TSH;2\"    atorvastatin, 40 mg, Oral, Daily  budesonide-formoterol, 2 puff, Inhalation, BID - RT  buprenorphine-naloxone, 2 tablet, Sublingual, Daily  citalopram, 20 mg, Oral, Daily  furosemide, 40 mg, Oral, Daily  guaiFENesin, 1,200 mg, Oral, Q12H  ipratropium-albuterol, 3 mL, Nebulization, 4x Daily - RT  levothyroxine, 175 mcg, Oral, Q AM  nicotine, 1 patch, Transdermal, Nightly  oseltamivir, 75 mg, Oral, Q12H  oxymetazoline, 2 spray, Each Nare, BID  potassium chloride, 10 mEq, Oral, Daily  sodium chloride, 10 mL, Intravenous, Q12H       Diet: Cardiac; Healthy Heart (2-3 Na+); Fluid Consistency: Thin (IDDSI 0)      Assessment & Plan      Active Hospital Problems    Diagnosis  POA    **Influenza A [J10.1]  Yes    COPD with acute exacerbation [J44.1]  Yes    Hypothyroidism (acquired) [E03.9]  Yes    Primary hypertension [I10]  Yes    Tobacco abuse [Z72.0]  Yes      Resolved Hospital Problems   No resolved problems to display.     63 yo with history of COPD, HLD, HTN, bipolar disorder, metastatic lung cancer, tobacco use who presented with cough, dyspnea. Diagnosed " with influenza    Currently on tamiflu and bronchodilators  CT and LE doppler negative for VTE  Resume home synthroid  On chronic pain medication  Hopefully can wean oxygen and dc tomorrow though may need home oxygen  Add agents for cough- though may have persistent post viral cough      DW staff  Reviewed records      Ibrahima Lundy MD  [facility]  03/15/25  12:32 EDT

## 2025-03-15 NOTE — PROGRESS NOTES
Dr. DOC Del Rio    [address]        Patient ID:  Name:  Kathraina Torres  MRN:  [id number]  1960  64 y.o.  female            CC/Reason for visit: Influenza A infection, hypoxemia    Interval hx: Less short of breath.  Still coughing.  On 3 L of oxygen saturating 93%    ROS: No chest pain, no abdominal pain    I reviewed old medical records.  Past medical history, social history and family history: Unchanged from admission H&P.      Vitals:  Vitals:    03/15/25 0828 03/15/25 1035 03/15/25 1040 03/15/25 1305   BP: 126/79   141/85   BP Location: Right arm   Right arm   Patient Position: Lying   Sitting   Pulse:  70 66 79   Resp:  16  16   Temp:    97.9 °F (36.6 °C)   TempSrc:    Oral   SpO2: 93% 97% 99% 93%   Weight:       Height:               Body mass index is 29.1 kg/m².    Intake/Output Summary (Last 24 hours) at 3/15/2025 1433  Last data filed at 3/15/2025 0950  Gross per 24 hour   Intake 118 ml   Output --   Net 118 ml       Exam:  GEN:  No distress  Alert, oriented x 3.   LUNGS: Scattered coarse breath sounds bilat, no use of accessory muscles  CV:  Normal S1S2, without murmur, no edema  ABD:  Non tender, no enlarged liver or masses      Scheduled meds:  atorvastatin, 40 mg, Oral, Daily  budesonide-formoterol, 2 puff, Inhalation, BID - RT  buprenorphine-naloxone, 2 tablet, Sublingual, Daily  citalopram, 20 mg, Oral, Daily  furosemide, 40 mg, Oral, Daily  guaiFENesin, 1,200 mg, Oral, Q12H  ipratropium-albuterol, 3 mL, Nebulization, 4x Daily - RT  levothyroxine, 175 mcg, Oral, Q AM  nicotine, 1 patch, Transdermal, Nightly  oseltamivir, 75 mg, Oral, Q12H  oxymetazoline, 2 spray, Each Nare, BID  potassium chloride, 10 mEq, Oral, Daily  sodium chloride, 10 mL, Intravenous, Q12H      IV meds:                           Data Review:   I reviewed the patient's medications and new clinical results.    COVID19   Date Value Ref Range Status   03/12/2025 Not Detected Not Detected - Ref. Range Final          Lab Results   Component Value Date    CALCIUM 8.5 (L) 03/14/2025    PHOS 2.5 10/25/2021    MG 2 06/01/2023    MG 2.0 09/15/2022    MG 2.1 07/18/2022     Results from last 7 days   Lab Units 03/14/25  1724 03/13/25  0348 03/12/25  1654   SODIUM mmol/L 139 142 141   POTASSIUM mmol/L 3.7 4.2 3.2*   CHLORIDE mmol/L 101 105 101   CO2 mmol/L 26.0 24.7 25.3   BUN mg/dL 12 5* 6*   CREATININE mg/dL 0.62 0.45* 0.62   CALCIUM mg/dL 8.5* 8.8 9.0   BILIRUBIN mg/dL  --   --  0.5   ALK PHOS U/L  --   --  89   ALT (SGPT) U/L  --   --  20   AST (SGOT) U/L  --   --  26   GLUCOSE mg/dL 94 123* 91   WBC 10*3/mm3 10.54 8.52 7.29   HEMOGLOBIN g/dL 14.5 14.1 14.4   PLATELETS 10*3/mm3 187 182 172   INR   --   --  1.26*   PROBNP pg/mL  --   --  60.1     Results from last 7 days   Lab Units 03/12/25  1655   BLOODCX  No growth at 2 days  No growth at 2 days             Results from last 7 days   Lab Units 03/12/25 2035   PH, ARTERIAL pH units 7.483*   PCO2, ARTERIAL mm Hg 35.4   PO2 ART mm Hg 54.9*   O2 SATURATION ART % 90.5*   MODALITY  Room Air              ASSESSMENT:     Influenza A    COPD with acute exacerbation        PLAN:  Patient is actually no longer wheezing.  May switch to inhaled steroids rather than systemic steroids.  Continue DuoNeb treatments and supportive care for influenza infection  On Afrin for significant sinus congestion  She was advised to quit smoking.  On nicotine patch      I reviewed the chart and other providers notes and reviewed labs.  Copied text in this note has been reviewed and is accurate as of today      Collin Del Rio MD  3/15/2025

## 2025-03-15 NOTE — PLAN OF CARE
Goal Outcome Evaluation:      Pt. Admitted to [facility] with Dr. Parker. Breathing txs continued. Pulm following. Tylenol for headache. NSR on the monitor and on 3L O2.

## 2025-03-16 ENCOUNTER — READMISSION MANAGEMENT (OUTPATIENT)
Dept: CALL CENTER | Facility: HOSPITAL | Age: 65
End: 2025-03-16
Payer: MEDICARE

## 2025-03-16 VITALS
OXYGEN SATURATION: 94 % | WEIGHT: 175.1 LBS | TEMPERATURE: 97.9 F | HEIGHT: 64 IN | RESPIRATION RATE: 16 BRPM | DIASTOLIC BLOOD PRESSURE: 90 MMHG | HEART RATE: 70 BPM | BODY MASS INDEX: 29.89 KG/M2 | SYSTOLIC BLOOD PRESSURE: 133 MMHG

## 2025-03-16 PROCEDURE — 94618 PULMONARY STRESS TESTING: CPT

## 2025-03-16 PROCEDURE — 94761 N-INVAS EAR/PLS OXIMETRY MLT: CPT

## 2025-03-16 PROCEDURE — 94760 N-INVAS EAR/PLS OXIMETRY 1: CPT

## 2025-03-16 PROCEDURE — 94664 DEMO&/EVAL PT USE INHALER: CPT

## 2025-03-16 PROCEDURE — 25010000002 PROCHLORPERAZINE 10 MG/2ML SOLUTION: Performed by: INTERNAL MEDICINE

## 2025-03-16 PROCEDURE — 94799 UNLISTED PULMONARY SVC/PX: CPT

## 2025-03-16 RX ORDER — FLUTICASONE PROPIONATE AND SALMETEROL XINAFOATE 115; 21 UG/1; UG/1
2 AEROSOL, METERED RESPIRATORY (INHALATION)
Qty: 12 G | Refills: 0 | Status: SHIPPED | OUTPATIENT
Start: 2025-03-16

## 2025-03-16 RX ORDER — BENZONATATE 200 MG/1
200 CAPSULE ORAL 3 TIMES DAILY PRN
Qty: 20 CAPSULE | Refills: 0 | Status: SHIPPED | OUTPATIENT
Start: 2025-03-16

## 2025-03-16 RX ORDER — OSELTAMIVIR PHOSPHATE 75 MG/1
75 CAPSULE ORAL EVERY 12 HOURS SCHEDULED
Qty: 2 CAPSULE | Refills: 0 | Status: SHIPPED | OUTPATIENT
Start: 2025-03-16 | End: 2025-03-17

## 2025-03-16 RX ORDER — DEXTROMETHORPHAN POLISTIREX 30 MG/5ML
60 SUSPENSION ORAL EVERY 12 HOURS SCHEDULED
Qty: 89 ML | Refills: 0 | Status: SHIPPED | OUTPATIENT
Start: 2025-03-16

## 2025-03-16 RX ORDER — ENOXAPARIN SODIUM 100 MG/ML
40 INJECTION SUBCUTANEOUS EVERY 24 HOURS
Status: DISCONTINUED | OUTPATIENT
Start: 2025-03-16 | End: 2025-03-16 | Stop reason: HOSPADM

## 2025-03-16 RX ORDER — BUTALBITAL, ACETAMINOPHEN AND CAFFEINE 50; 325; 40 MG/1; MG/1; MG/1
1 TABLET ORAL ONCE
Status: COMPLETED | OUTPATIENT
Start: 2025-03-16 | End: 2025-03-16

## 2025-03-16 RX ORDER — PROCHLORPERAZINE EDISYLATE 5 MG/ML
5 INJECTION INTRAMUSCULAR; INTRAVENOUS ONCE
Status: COMPLETED | OUTPATIENT
Start: 2025-03-16 | End: 2025-03-16

## 2025-03-16 RX ADMIN — ATORVASTATIN CALCIUM 40 MG: 20 TABLET, FILM COATED ORAL at 08:19

## 2025-03-16 RX ADMIN — FUROSEMIDE 40 MG: 40 TABLET ORAL at 08:19

## 2025-03-16 RX ADMIN — IPRATROPIUM BROMIDE AND ALBUTEROL SULFATE 3 ML: .5; 3 SOLUTION RESPIRATORY (INHALATION) at 14:58

## 2025-03-16 RX ADMIN — ACETAMINOPHEN 325MG 650 MG: 325 TABLET ORAL at 04:27

## 2025-03-16 RX ADMIN — BUTALBITAL, ACETAMINOPHEN, AND CAFFEINE 1 TABLET: 50; 325; 40 TABLET ORAL at 11:10

## 2025-03-16 RX ADMIN — GUAIFENESIN 1200 MG: 600 TABLET, MULTILAYER, EXTENDED RELEASE ORAL at 08:18

## 2025-03-16 RX ADMIN — POTASSIUM CHLORIDE 10 MEQ: 750 TABLET, EXTENDED RELEASE ORAL at 08:19

## 2025-03-16 RX ADMIN — OSELTAMIVIR PHOSPHATE 75 MG: 75 CAPSULE ORAL at 08:19

## 2025-03-16 RX ADMIN — PROCHLORPERAZINE EDISYLATE 5 MG: 5 INJECTION INTRAMUSCULAR; INTRAVENOUS at 11:10

## 2025-03-16 RX ADMIN — CITALOPRAM HYDROBROMIDE 20 MG: 20 TABLET ORAL at 08:19

## 2025-03-16 RX ADMIN — BUPRENORPHINE HYDROCHLORIDE AND NALOXONE HYDROCHLORIDE DIHYDRATE 2 TABLET: 8; 2 TABLET SUBLINGUAL at 08:18

## 2025-03-16 RX ADMIN — IPRATROPIUM BROMIDE AND ALBUTEROL SULFATE 3 ML: .5; 3 SOLUTION RESPIRATORY (INHALATION) at 06:35

## 2025-03-16 RX ADMIN — LEVOTHYROXINE SODIUM 175 MCG: 0.03 TABLET ORAL at 05:37

## 2025-03-16 RX ADMIN — IPRATROPIUM BROMIDE AND ALBUTEROL SULFATE 3 ML: .5; 3 SOLUTION RESPIRATORY (INHALATION) at 10:39

## 2025-03-16 NOTE — PROGRESS NOTES
Exercise Oximetry    Patient Name:Katharina Torres   MRN: [id number]   Date: 03/16/25             ROOM AIR BASELINE   SpO2% 92%   Heart Rate 70   Blood Pressure      EXERCISE ON ROOM AIR SpO2% EXERCISE ON O2 @ 2LPM SpO2%   1 MINUTE 90 1 MINUTE    2 MINUTES 86 2 MINUTES    3 MINUTES  3 MINUTES 87   4 MINUTES  4 MINUTES 90   5 MINUTES  5 MINUTES 92   6 MINUTES  6 MINUTES 93              Distance Walked   Distance Walked   Dyspnea (Silvia Scale)   Dyspnea (Silvia Scale)   Fatigue (Silvia Scale)   Fatigue (Silvia Scale)   SpO2% Post Exercise   SpO2% Post Exercise  91%   HR Post Exercise   HR Post Exercise  77   Time to Recovery   Time to Recovery  2 minutes     Comments: At rest on room air, Sat 92, and HR 70. After one minute walk, Sat decreased to 86%. On 1L, Sat 87%, and on 2L Sat increased 90-93% for the rest of the exercise. No sign of respiratory distress during exercise. It took patient about 2 minutes to recover from exercise. RT will recommend 2L oxygen for home

## 2025-03-16 NOTE — PLAN OF CARE
Goal Outcome Evaluation:         Patient discharged per order. IV an heart monitor removed. AVS reviewed with patient, copy given, and all questions answered. Patient showed no signs or symptoms of distress at discharge

## 2025-03-16 NOTE — DISCHARGE PLACEMENT REQUEST
"Katharina Torres (64 y.o. Female)       Date of Birth   1960    Social Security Number       Address   [address] [place] [address]    Home Phone   [phone]    MRN   [id number]       Spiritism   Jain    Marital Status                               Admission Date   3/12/2025    Admission Type   Emergency    Admitting Provider   Kayla Parker MD    Attending Provider   Ibrahima Lundy MD    Department, Room/Bed   [address], [address]       Discharge Date       Discharge Disposition   Home or Self Care    Discharge Destination                                 Attending Provider: Ibrahima Lundy MD    Allergies: No Known Allergies    Isolation: Droplet   Infection: Influenza (03/12/25), MRSA/History Only (03/13/25)   Code Status: CPR    Ht: 162.6 cm (64\")   Wt: 79.4 kg (175 lb 1.6 oz)    Admission Cmt: None   Principal Problem: Influenza A [J10.1]                   Active Insurance as of 3/12/2025       Primary Coverage       Payor Plan Insurance Group Employer/Plan Group    [employer] [place] MEDICARE REPLACEMENT WELLCARE MEDICARE ADVANTAGE O NON PAR        Payor Plan Address Payor Plan Phone Number Payor Plan Fax Number Effective Dates    [address] [address]   9/1/2024 - None Entered    [facility] [id number]         Subscriber Name Subscriber Birth Date Member ID       KATHARINA TORRES 1960 [id number]                     Emergency Contacts        (Rel.) Home Phone Work Phone Mobile Phone    PRISCILA MCQUEEN (Daughter) -- -- [phone]                "

## 2025-03-16 NOTE — PLAN OF CARE
Problem: Adult Inpatient Plan of Care  Goal: Plan of Care Review  Outcome: Progressing  Goal: Patient-Specific Goal (Individualized)  Outcome: Progressing  Goal: Absence of Hospital-Acquired Illness or Injury  Outcome: Progressing  Intervention: Identify and Manage Fall Risk  Recent Flowsheet Documentation  Taken 3/16/2025 0427 by Nuria Marroquin RN  Safety Promotion/Fall Prevention:   safety round/check completed   assistive device/personal items within reach   clutter free environment maintained   activity supervised   lighting adjusted   nonskid shoes/slippers when out of bed  Taken 3/16/2025 0211 by Nuria Marroquin RN  Safety Promotion/Fall Prevention:   safety round/check completed   assistive device/personal items within reach   clutter free environment maintained   nonskid shoes/slippers when out of bed   room organization consistent  Taken 3/16/2025 0041 by Nuria Marroquin RN  Safety Promotion/Fall Prevention:   safety round/check completed   assistive device/personal items within reach   clutter free environment maintained   activity supervised   lighting adjusted   nonskid shoes/slippers when out of bed  Taken 3/15/2025 2040 by Nuria Marroquin RN  Safety Promotion/Fall Prevention:   safety round/check completed   room organization consistent   nonskid shoes/slippers when out of bed   clutter free environment maintained   assistive device/personal items within reach  Intervention: Prevent Skin Injury  Recent Flowsheet Documentation  Taken 3/16/2025 0427 by Nuria Marroquin RN  Body Position: position changed independently  Taken 3/16/2025 0211 by Nuria Marroquin RN  Body Position: position changed independently  Taken 3/16/2025 0041 by Nuria Marroquin RN  Body Position: position changed independently  Taken 3/15/2025 2040 by Nuria Marroquin RN  Body Position: position changed independently  Skin Protection: pulse oximeter probe site changed  Intervention: Prevent and Manage VTE (Venous  Thromboembolism) Risk  Recent Flowsheet Documentation  Taken 3/16/2025 0211 by Nuria Marroquin RN  VTE Prevention/Management:   bilateral   SCDs (sequential compression devices) off   patient refused intervention  Taken 3/15/2025 2040 by Nuria Marroquin RN  VTE Prevention/Management: patient refused intervention  Intervention: Prevent Infection  Recent Flowsheet Documentation  Taken 3/16/2025 0211 by Nuria Marroquin RN  Infection Prevention: hand hygiene promoted  Taken 3/15/2025 2040 by Nuria Marroquin RN  Infection Prevention:   personal protective equipment utilized   hand hygiene promoted   single patient room provided  Goal: Optimal Comfort and Wellbeing  Outcome: Progressing  Intervention: Monitor Pain and Promote Comfort  Recent Flowsheet Documentation  Taken 3/16/2025 0427 by Nuria Marroquin RN  Pain Management Interventions:   pain medication given   position adjusted   relaxation techniques promoted  Taken 3/15/2025 2040 by Nuria Marroquin RN  Pain Management Interventions:   pain medication given   pillow support provided   position adjusted   quiet environment facilitated  Intervention: Provide Person-Centered Care  Recent Flowsheet Documentation  Taken 3/15/2025 2040 by Nuria Marroquin RN  Trust Relationship/Rapport:   care explained   choices provided   empathic listening provided   questions answered   thoughts/feelings acknowledged  Goal: Readiness for Transition of Care  Outcome: Progressing     Problem: Comorbidity Management  Goal: Maintenance of Asthma Control  Outcome: Progressing  Intervention: Maintain Asthma Symptom Control  Recent Flowsheet Documentation  Taken 3/15/2025 2040 by Nuria Marroquin RN  Medication Review/Management: medications reviewed  Goal: Maintenance of COPD Symptom Control  Outcome: Progressing  Intervention: Maintain COPD (Chronic Obstructive Pulmonary Disease) Symptom Control  Recent Flowsheet Documentation  Taken 3/15/2025 2040 by Nuria Marroquin RN  Breathing  Techniques/Airway Clearance: deep/controlled cough encouraged  Medication Review/Management: medications reviewed     Problem: Breathing Pattern Ineffective  Goal: Effective Breathing Pattern  Outcome: Progressing  Intervention: Promote Improved Breathing Pattern  Recent Flowsheet Documentation  Taken 3/16/2025 0427 by Nuria Marroquin RN  Head of Bed ([facility]) Positioning: HOB lowered  Taken 3/16/2025 0211 by Nuria Marroquin RN  Head of Bed ([facility]) Positioning: HOB elevated  Taken 3/16/2025 0041 by Nuria Marroquin RN  Head of Bed ([facility]) Positioning: HOB lowered  Taken 3/15/2025 2040 by Nuria Marroquin RN  Supportive Measures: mindfulness techniques promoted  Head of Bed ([facility]) Positioning: HOB elevated  Airway/Ventilation Management: oxygen therapy provided  Breathing Techniques/Airway Clearance: deep/controlled cough encouraged     Problem: Airway Clearance Ineffective  Goal: Effective Airway Clearance  Outcome: Progressing  Intervention: Promote Airway Secretion Clearance  Recent Flowsheet Documentation  Taken 3/15/2025 2040 by Nuria Marroquin RN  Activity Management: activity encouraged  Breathing Techniques/Airway Clearance: deep/controlled cough encouraged  Cough And Deep Breathing: done independently per patient     Problem: Fall Injury Risk  Goal: Absence of Fall and Fall-Related Injury  Outcome: Progressing  Intervention: Identify and Manage Contributors  Recent Flowsheet Documentation  Taken 3/15/2025 2040 by Nuria Marroquin RN  Medication Review/Management: medications reviewed  Intervention: Promote Injury-Free Environment  Recent Flowsheet Documentation  Taken 3/16/2025 0427 by Nuria Marroquin RN  Safety Promotion/Fall Prevention:   safety round/check completed   assistive device/personal items within reach   clutter free environment maintained   activity supervised   lighting adjusted   nonskid shoes/slippers when out of bed  Taken 3/16/2025 0211 by Nuria Marroquin RN  Safety Promotion/Fall  Prevention:   safety round/check completed   assistive device/personal items within reach   clutter free environment maintained   nonskid shoes/slippers when out of bed   room organization consistent  Taken 3/16/2025 0041 by Nuria Marroquin, RN  Safety Promotion/Fall Prevention:   safety round/check completed   assistive device/personal items within reach   clutter free environment maintained   activity supervised   lighting adjusted   nonskid shoes/slippers when out of bed  Taken 3/15/2025 2040 by Nuria Marroquin RN  Safety Promotion/Fall Prevention:   safety round/check completed   room organization consistent   nonskid shoes/slippers when out of bed   clutter free environment maintained   assistive device/personal items within reach   Goal Outcome Evaluation:      No acute changes this shift. Pt on 2LNC, unable to wean. Cough and pain still present, prns and scheduled meds given with therapeutic relief. Plan of care ongoing.

## 2025-03-16 NOTE — PROGRESS NOTES
Dr. DOC Del Rio    [address]        Patient ID:  Name:  Katharina Torres  MRN:  [id number]  1960  64 y.o.  female            CC/Reason for visit: Influenza infection, COPD exacerbation, active tobacco user/smoker    Interval hx: Still coughing feels better.  Not requiring oxygen  Walking oximetry showed she needs 2 L of oxygen during exertion but not at rest    ROS: No chest pain, no abdominal pain    Vitals:  Vitals:    03/16/25 0637 03/16/25 0710 03/16/25 1039 03/16/25 1150   BP:  133/90     BP Location:  Right arm     Patient Position:  Lying     Pulse: 71 70 69 72   Resp:  16 16    Temp:  97.9 °F (36.6 °C)     TempSrc:  Oral     SpO2: 99% 99% 90% 90%   Weight:       Height:               Body mass index is 30.06 kg/m².    Intake/Output Summary (Last 24 hours) at 3/16/2025 1341  Last data filed at 3/16/2025 1200  Gross per 24 hour   Intake 868 ml   Output --   Net 868 ml       Exam:  GEN:  No distress  Alert, oriented x 3.   LUNGS: Diminished breath sounds and a few rhonchi sounds bilat, no use of accessory muscles  CV:  Normal S1S2, without murmur, no edema  ABD:  Non tender, no enlarged liver or masses      Scheduled meds:  atorvastatin, 40 mg, Oral, Daily  benzonatate, 200 mg, Oral, TID  budesonide-formoterol, 2 puff, Inhalation, BID - RT  buprenorphine-naloxone, 2 tablet, Sublingual, Daily  citalopram, 20 mg, Oral, Daily  dextromethorphan polistirex ER, 60 mg, Oral, Q12H  enoxaparin sodium, 40 mg, Subcutaneous, Q24H  furosemide, 40 mg, Oral, Daily  guaiFENesin, 1,200 mg, Oral, Q12H  ipratropium-albuterol, 3 mL, Nebulization, 4x Daily - RT  levothyroxine, 175 mcg, Oral, Q AM  nicotine, 1 patch, Transdermal, Nightly  oseltamivir, 75 mg, Oral, Q12H  oxymetazoline, 2 spray, Each Nare, BID  potassium chloride, 10 mEq, Oral, Daily  sodium chloride, 10 mL, Intravenous, Q12H      IV meds:                           Data Review:   I reviewed the patient's medications and new clinical  results.            Results from last 7 days   Lab Units 03/14/25  1724 03/13/25  0348 03/12/25  1654   SODIUM mmol/L 139 142 141   POTASSIUM mmol/L 3.7 4.2 3.2*   CHLORIDE mmol/L 101 105 101   CO2 mmol/L 26.0 24.7 25.3   BUN mg/dL 12 5* 6*   CREATININE mg/dL 0.62 0.45* 0.62   CALCIUM mg/dL 8.5* 8.8 9.0   BILIRUBIN mg/dL  --   --  0.5   ALK PHOS U/L  --   --  89   ALT (SGPT) U/L  --   --  20   AST (SGOT) U/L  --   --  26   GLUCOSE mg/dL 94 123* 91   WBC 10*3/mm3 10.54 8.52 7.29   HEMOGLOBIN g/dL 14.5 14.1 14.4   PLATELETS 10*3/mm3 187 182 172   INR   --   --  1.26*   PROBNP pg/mL  --   --  60.1     Results from last 7 days   Lab Units 03/12/25  1655   BLOODCX  No growth at 3 days  No growth at 3 days             Results from last 7 days   Lab Units 03/12/25  2035   PH, ARTERIAL pH units 7.483*   PCO2, ARTERIAL mm Hg 35.4   PO2 ART mm Hg 54.9*   O2 SATURATION ART % 90.5*   MODALITY  Room Air            ASSESSMENT:     Influenza A    Chronic narcotic use    Hypothyroidism (acquired)    Primary hypertension    Tobacco abuse    COPD with acute exacerbation        PLAN:  Patient is still smoking cigarettes.  I advised her to quit.  She was admitted here for COPD exacerbation caused by influenza infection.  She received symptomatic/supportive care and did well.  She does not have a pulmonologist.  She would like to follow-up with us in the office.  I will have her see HENRY Salazar within 1 week in our pulmonary clinic.  Patient can be discharged home on Symbicort or equivalent inhaler, which ever is cheapest under her insurance plan.          Collin Del Rio MD  3/16/2025

## 2025-03-16 NOTE — CASE MANAGEMENT/SOCIAL WORK
Continued Stay Note  [facility]     Patient Name: Katharina Torres  MRN: [id number]  Today's Date: 3/16/2025    Admit Date: 3/12/2025    Plan: Plans home via private auto, [place] for home O2   Discharge Plan       Row Name 03/16/25 1556       Plan    Plan Plans home via private auto, [place] for home O2    Patient/Family in Agreement with Plan yes    Plan Comments Inbound call from staff RN who states pt is going to go home with O2. Chart reviewed. Spoke with pt and she would like to use [facility] for home O2. In basket referral in [facility]. Spoke with [facility] and they can accept and follow. Portable O2 tank delivered to pt's bedside. Explained to pt and family she will need to call [facility] on the way home to make arrangements for home O2 set up. Pt verbalized understanding.........JW                   Discharge Codes    No documentation.                 Expected Discharge Date and Time       Expected Discharge Date Expected Discharge Time    Mar 16, 2025               Ailyn Mendoza, RN

## 2025-03-16 NOTE — CASE MANAGEMENT/SOCIAL WORK
Case Management Discharge Note      Final Note: dc home with oxygen through [facility]    Provided Post Acute Provider List?: N/A  Provided Post Acute Provider Quality & Resource List?: N/A    Selected Continued Care - Discharged on 3/16/2025 Admission date: 3/12/2025 - Discharge disposition: Home or Self Care      Destination    No services have been selected for the patient.                Durable Medical Equipment Coordination complete.      Service Provider Services Address Phone Fax Patient Preferred    [facility] [address] [address] [phone] --              Dialysis/Infusion    No services have been selected for the patient.                Home Medical Care    No services have been selected for the patient.                Therapy    No services have been selected for the patient.                Community Resources    No services have been selected for the patient.                Community & DME    No services have been selected for the patient.                    Transportation Services  Private: Car    Final Discharge Disposition Code: 01 - home or self-care

## 2025-03-17 LAB
BACTERIA SPEC AEROBE CULT: NORMAL
BACTERIA SPEC AEROBE CULT: NORMAL

## 2025-03-17 NOTE — PAYOR COMM NOTE
"Katharina Torres (64 y.o. Female)     PLEASE SEE ATTACHED FOR INPT AUTH     PT WAS OBS ON 3/12  CHANGED TO INPT ON 3/14      REF #    PT ID       [id number]       PLEASE CALL DANICA CHEN RN/ DEPT @ [phone]  OR FAX  DEPARTMENT @  [phone]    THANK YOU   DANICA CHEN RN  [facility]            Date of Birth   1960    Social Security Number       Address   [address] [place] [address] [address] [address]    Home Phone   [phone]    MRN   [id number]       Moravian   Sikh    Marital Status                               Admission Date   3/12/2025 OBS  3/14/25 INPT     Admission Type   Emergency    Admitting Provider   Kayla Parker MD    Attending Provider       Department, Room/Bed   [address], [address]/1       Discharge Date   3/16/2025    Discharge Disposition   Home or Self Care    Discharge Destination                                 Attending Provider: (none)   Allergies: No Known Allergies    Isolation: None   Infection: Influenza (03/12/25), MRSA/History Only (03/13/25)   Code Status: Prior    Ht: 162.6 cm (64\")   Wt: 79.4 kg (175 lb 1.6 oz)    Admission Cmt: None   Principal Problem: Influenza A [J10.1]                   Active Insurance as of 3/12/2025       Primary Coverage       Payor Plan Insurance Group Employer/Plan Group    [employer] [place] MEDICARE REPLACEMENT [facility] NON PAR        Payor Plan Address Payor Plan Phone Number Payor Plan Fax Number Effective Dates    [address] [address]   9/1/2024 - None Entered    [facility] [id number]         Subscriber Name Subscriber Birth Date Member ID       KATHARINA TORRES 1960 [id number]                     Emergency Contacts        (Rel.) Home Phone Work Phone Mobile Phone    PRISCILA MCQUEEN (Daughter) -- -- [phone]              [place]: NPI [id number]  Tax ID [id number]     History & Physical        Bette Denise, APRN at 03/12/25 2140       " Attestation signed by Nini Gonzales MD at 03/13/25 0002    The GALEN and I have discussed this patient's history, physical exam and treatment plan.  I provided a substantive portion of the care of this patient.  I have reviewed the documentation and personally had a face to face interaction with the patient and personally performed the physical exam, in its entirety.  I affirm the documentation and agree with the treatment and plan.  The following describes my personal findings.  SHARED VISIT: This visit was performed by BOTH a physician and an APC. The substantive portion of the medical decision making was performed by this attesting physician who made or approved the management plan and takes responsibility for patient management. All studies in the APC note (if performed) were independently interpreted by me.       The patient is admitted to the observation unit for further evaluation/monitoring/treatment of URI symptoms with some dyspnea.  Patient not currently hypoxic, reports she is a smoker, reports she has had shortness of air worse than usual for her with cough and congestion over the past 2 days.  Patient reports she has had chills, reports nausea but denies vomiting,.    Comprehensive Physical exam:  Patient is nontoxic appearing oriented, conversant awake, alert  HEENT: normocephalic, atraumatic  Neck: no goiter, no pain with ROM  Pulmonary: Nontachypneic, occasional expiratory wheeze, no Rales, O2 sats 91% on room air  cardiovascular: Nontachycardic  Abdomen: Soft, nontender  musculoskeletal: Good range of motion x 4, no significant pedal edema  Neuro/psychiatric:calm, appropriate, cooperative  Skin:warm, dry    Plan    Influenza A provoking AECOPD  -Every 4 hour albuterol/Atrovent  -Tamiflu twice daily for 5 days  -Incentive spirometry regular use  -Pulmonology consult      SHARED APC FACE TO FACE: I performed a substantive part of the MDM during the patient's E/M visit. I personally evaluated and  "examined the patient. I personally made or approved the documented management plan   Nini Gonzales MD 3/12/2025 23:58 EDT                          [facility]   HISTORY AND PHYSICAL    Patient Name: Katharina Torres  : 1960  MRN: [id number]  Primary Care Physician:  Provider, No Known  Date of admission: 3/12/2025    Subjective  Subjective     Chief Complaint:   Chief Complaint   Patient presents with    Cough         HPI:    Katharina Torres is a 64 y.o. female with a past medical history including, but not limited to, anxiety, bipolar disorder, COPD, lung cancer currently in remission taking Keytruda, depression, diabetes, hypertension, and hyperlipidemia, is admitted to the observation unit with influenza A and COPD exacerbation.  States she has had a productive cough for over 3 months.  However in the last 24 hours she has had bodyaches.  She denies any fever, chills, nausea, vomiting, diarrhea, chest pain, or abdominal pain..  She does state that she will have\" coughing fits\" that will cause her to vomit.  She does state that her daughter, whom she is the primary caregiver for, was sick over the past 2 weeks but did not seek medical care.  Pulmonology has been consulted to see the patient.      Review of Systems   All systems were reviewed and negative except for: What was mentioned above in the HPI.    Personal History     Past Medical History:   Diagnosis Date    Addiction     ALCOHOL LAST USED 1 YEAR AGO, AMPHETAMINES LAST USED 22, PILLS PERCOCET LAST USED 2022    Afib     APPROX 1 YEAR AGO - [facility]    Allergic     Anxiety     Arthritis     KNEE, RIGHT KNEE    Bipolar 1 disorder     Cancer     LUNG CA/ BRAIN- RADIATION    COPD (chronic obstructive pulmonary disease)     Depression     Diabetes mellitus     DVT, bilateral lower limbs     Hyperlipidemia     Hypertension     Pneumonia     Thyroid disease        Past Surgical History:   Procedure Laterality Date    CARPAL TUNNEL RELEASE   "    HEEL SPUR SURGERY Left     VENOUS ACCESS DEVICE (PORT) INSERTION      FOR CHEMO       Family History: family history includes Cancer in her father; Diabetes in her father and mother; Heart disease in her father and mother; Stroke in her father. Otherwise pertinent FHx was reviewed and not pertinent to current issue.    Social History:  reports that she has been smoking cigarettes. She started smoking about 38 years ago. She has a 57.6 pack-year smoking history. She has never used smokeless tobacco. She reports that she does not currently use alcohol. She reports that she does not currently use drugs.    Home Medications:  ALPRAZolam, albuterol, albuterol sulfate HFA, amLODIPine, atorvastatin, buprenorphine-naloxone, citalopram, docusate sodium, furosemide, hydrOXYzine, levothyroxine, lisinopril, metFORMIN, ondansetron, potassium chloride, predniSONE, and traZODone    Allergies:  No Known Allergies    Objective  Objective     Vitals:   Temp:  [98.3 °F (36.8 °C)] 98.3 °F (36.8 °C)  Heart Rate:  [71-89] 82  Resp:  [16] 16  BP: (138-151)/(80-93) 142/89  Physical Exam   Constitutional: Awake, appears to not feel well  Eyes: PERRLA   HENT: NCAT, mucous membranes moist   Neck: Supple   Respiratory: Clear to auscultation bilaterally, nonlabored respirations    Cardiovascular: regular rate, palpable pedal pulses bilaterally   Gastrointestinal: Positive bowel sounds, soft, nontender, nondistended   Musculoskeletal: No bilateral ankle edema   Psychiatric: Flat affect, cooperative   Neurologic: Oriented x 3, speech clear   Skin: No rashes       Result Review   Result Review:  I have personally reviewed the results from the time of this admission to 3/12/2025 21:40 EDT and agree with these findings:  [x]  Laboratory list / accordion  []  Microbiology  [x]  Radiology  []  EKG/Telemetry   []  Cardiology/Vascular   []  Pathology  [x]  Old records  []  Other:    Initial lab work in the emergency department is unremarkable other  than potassium 3.2, D-dimer 1.13, all other lab work is baseline for the patient.  Blood cultures are pending at this time.  Respiratory viral panel PCR is positive for influenza A.  Chest x-ray shows no focal pulmonary consolidations.  EKG shows sinus rhythm, rate of 69.  CTA of the chest shows pulmonary arteries are well-opacified and there is no evidence of pulmonary embolism.  New areas of patchy alveolar type infiltrate in the posterior medial right lower lobe and to a lesser degree in the right middle lobe with associated bronchial thickening.      Assessment & Plan  Assessment / Plan     Brief Patient Summary:  Katharina Torres is a 64 y.o. female who was admitted to the observation unit for further evaluation and treatment of her cough, influenza A, and COPD exacerbation.  Neurology has been consulted to see the patient.    Active Hospital Problems:  Active Hospital Problems    Diagnosis     **Influenza A      Plan:   Influenza A  COPD exacerbation  History of lung cancer-currently in remission being treated with Keytruda  -Cardiac monitor  -Vital signs every 4 hours  -Continuous pulse ox  -Mucinex 1200 mg p.o. twice daily  -DuoNebs every 4 hours  -Albuterol mini nebs as needed  -Incentive spirometer every 4 hours while awake  -Tamiflu 75 mg p.o. twice daily for 5 days  -Zithromax 500 mg p.o. nightly x 3 days  -Pulmonology consult    Tobacco abuse  -NicoDerm patch  -As needed Nicorette gum    Hypothyroidism  -Continue home dose Synthroid    VTE Prophylaxis:  Mechanical VTE prophylaxis orders are present.        CODE STATUS:    Code Status (Patient has no pulse and is not breathing): CPR (Attempt to Resuscitate)  Medical Interventions (Patient has pulse or is breathing): Full Support    Admission Status:  I believe this patient meets observation status.    78 minutes have been spent by [facility] providers in the care of this patient while under observation status.      Appropriate  PPE worn during patient encounter.  Hand hygeine performed before and after seeing the patient.      Electronically signed by HENRY Palacios, 03/12/25, 9:40 PM EDT.             Electronically signed by Nini Gonzales MD at 03/13/25 0002          Emergency Department Notes        Manuel Sabillon MD at 03/12/25 1606           EMERGENCY DEPARTMENT ENCOUNTER  Room Number:  32/32  PCP: Provider, No Known  Independent Historians: Patient and granddaughter      HPI:  Chief Complaint: Productive cough and dyspnea    A complete HPI/ROS/PMH/PSH/SH/FH are unobtainable due to: None    Chronic or social conditions impacting patient care (Social Determinants of Health): None      Context: Katharina Torres is a 64 y.o. female with a medical history of tobacco abuse, alcohol abuse, bipolar, hypothyroidism, hypertension and diabetes who presents to the ED c/o acute productive cough and dyspnea.  The patient reports that she has had this cough for 3 months and has been on at least 1 if not 2 rounds of antibiotics with no improvement.  She is still a daily smoker.  She reports green/brown sputum production but no hemoptysis.  No reported fevers in the last week or 2.  Patient reports sometimes she coughs so much that she vomits but otherwise has had no vomiting or diarrhea nor abdominal pain reported.  Patient does report a prior history of cancer and reports she is currently in remission though she follows with oncology for regular checkups.      Review of prior external notes (non-ED) -and- Review of prior external test results outside of this encounter:  Hospital discharge summary 10/27/2021 from [facility] reviewed: Patient noted to have a history of stage IV lung cancer undergoing active chemotherapy with Keytruda as well as polysubstance abuse including alcoholism and methamphetamine use.  Patient was admitted for stabilization from substance withdrawal.  ================================  Patient was seen at [facility]  facility 3/3/2025 and apparently prescribed Augmentin      PAST MEDICAL HISTORY  Active Ambulatory Problems     Diagnosis Date Noted    Chronic narcotic use 10/23/2021    Hypothyroidism (acquired) 10/23/2021    Primary hypertension 10/23/2021    Positive urine drug screen 10/23/2021    Tobacco abuse 10/23/2021    Primary osteoarthritis of left hip 09/09/2022    Primary osteoarthritis of left knee 09/09/2022     Resolved Ambulatory Problems     Diagnosis Date Noted    Alcohol withdrawal syndrome without complication 10/23/2021    Hypokalemia 10/23/2021    Hypophosphatemia 10/24/2021     Past Medical History:   Diagnosis Date    Addiction     Afib     Allergic     Anxiety     Arthritis     Bipolar 1 disorder     Cancer     COPD (chronic obstructive pulmonary disease)     Depression     Diabetes mellitus     DVT, bilateral lower limbs     Hyperlipidemia     Hypertension     Pneumonia     Thyroid disease          PAST SURGICAL HISTORY  Past Surgical History:   Procedure Laterality Date    CARPAL TUNNEL RELEASE      HEEL SPUR SURGERY Left     VENOUS ACCESS DEVICE (PORT) INSERTION      FOR CHEMO         FAMILY HISTORY  Family History   Problem Relation Age of Onset    Heart disease Mother     Diabetes Mother     Cancer Father     Stroke Father     Heart disease Father     Diabetes Father          SOCIAL HISTORY  Social History     Socioeconomic History    Marital status:    Tobacco Use    Smoking status: Every Day     Current packs/day: 1.50     Average packs/day: 1.5 packs/day for 38.4 years (57.6 ttl pk-yrs)     Types: Cigarettes     Start date: 10/23/1986    Smokeless tobacco: Never   Vaping Use    Vaping status: Never Used   Substance and Sexual Activity    Alcohol use: Not Currently    Drug use: Not Currently    Sexual activity: Not Currently     Partners: Male     Birth control/protection: None         ALLERGIES  Patient has no known allergies.      REVIEW OF SYSTEMS  Review of Systems  Included in HPI  All  systems reviewed and negative except for those discussed in HPI.      PHYSICAL EXAM    I have reviewed the triage vital signs and nursing notes.    ED Triage Vitals [03/12/25 1522]   Temp Heart Rate Resp BP SpO2   98.3 °F (36.8 °C) 81 16 143/83 92 %      Temp src Heart Rate Source Patient Position BP Location FiO2 (%)   Oral -- -- -- --       Physical Exam    Physical Exam   Constitutional: No distress.  Nontoxic but frequently coughing.  HENT:  Head: Normocephalic and atraumatic.   Oropharynx: Mucous membranes are moist.   Eyes: . No scleral icterus. No conjunctival pallor.  Neck: Normal range of motion. Neck supple.   Cardiovascular: Pink warm and well perfused throughout.  Regular  Pulmonary/Chest: Able to speak in full sentences with no respiratory distress.  There is significantly diminished breath sounds bilaterally with some expiratory wheezing noted.  Abdominal: Soft. There is no tenderness. There is no rebound and no guarding.   Musculoskeletal: Moves all extremities equally.  Patient complains of diffuse lower extremity pain with any palpation of her legs bilaterally.  No pitting edema noted.  Neurological: Alert and oriented.  No acute focal deficit appreciated.  Skin: Skin is pink, warm, and dry.   Psychiatric: Mood and affect normal.   Nursing note and vitals reviewed.             LAB RESULTS  Recent Results (from the past 24 hours)   ECG 12 Lead Dyspnea    Collection Time: 03/12/25  4:39 PM   Result Value Ref Range    QT Interval 435 ms    QTC Interval 465 ms   Comprehensive Metabolic Panel    Collection Time: 03/12/25  4:54 PM    Specimen: Arm, Right; Blood   Result Value Ref Range    Glucose 91 65 - 99 mg/dL    BUN 6 (L) 8 - 23 mg/dL    Creatinine 0.62 0.57 - 1.00 mg/dL    Sodium 141 136 - 145 mmol/L    Potassium 3.2 (L) 3.5 - 5.2 mmol/L    Chloride 101 98 - 107 mmol/L    CO2 25.3 22.0 - 29.0 mmol/L    Calcium 9.0 8.6 - 10.5 mg/dL    Total Protein 7.3 6.0 - 8.5 g/dL    Albumin 4.3 3.5 - 5.2 g/dL     ALT (SGPT) 20 1 - 33 U/L    AST (SGOT) 26 1 - 32 U/L    Alkaline Phosphatase 89 39 - 117 U/L    Total Bilirubin 0.5 0.0 - 1.2 mg/dL    Globulin 3.0 gm/dL    A/G Ratio 1.4 g/dL    BUN/Creatinine Ratio 9.7 7.0 - 25.0    Anion Gap 14.7 5.0 - 15.0 mmol/L    eGFR 100.8 >60.0 mL/min/1.73   Protime-INR    Collection Time: 03/12/25  4:54 PM    Specimen: Arm, Right; Blood   Result Value Ref Range    Protime 15.8 (H) 11.7 - 14.2 Seconds    INR 1.26 (H) 0.90 - 1.10   aPTT    Collection Time: 03/12/25  4:54 PM    Specimen: Arm, Right; Blood   Result Value Ref Range    PTT 27.7 22.7 - 35.4 seconds   BNP    Collection Time: 03/12/25  4:54 PM    Specimen: Arm, Right; Blood   Result Value Ref Range    proBNP 60.1 0.0 - 900.0 pg/mL   Lactic Acid, Plasma    Collection Time: 03/12/25  4:54 PM    Specimen: Arm, Right; Blood   Result Value Ref Range    Lactate 1.2 0.5 - 2.0 mmol/L   D-dimer, Quantitative    Collection Time: 03/12/25  4:54 PM    Specimen: Arm, Right; Blood   Result Value Ref Range    D-Dimer, Quantitative 1.13 (H) 0.00 - 0.62 MCGFEU/mL   CBC Auto Differential    Collection Time: 03/12/25  4:54 PM    Specimen: Arm, Right; Blood   Result Value Ref Range    WBC 7.29 3.40 - 10.80 10*3/mm3    RBC 5.08 3.77 - 5.28 10*6/mm3    Hemoglobin 14.4 12.0 - 15.9 g/dL    Hematocrit 43.1 34.0 - 46.6 %    MCV 84.8 79.0 - 97.0 fL    MCH 28.3 26.6 - 33.0 pg    MCHC 33.4 31.5 - 35.7 g/dL    RDW 12.9 12.3 - 15.4 %    RDW-SD 39.8 37.0 - 54.0 fl    MPV 9.7 6.0 - 12.0 fL    Platelets 172 140 - 450 10*3/mm3    Neutrophil % 68.7 42.7 - 76.0 %    Lymphocyte % 25.1 19.6 - 45.3 %    Monocyte % 5.5 5.0 - 12.0 %    Eosinophil % 0.3 0.3 - 6.2 %    Basophil % 0.1 0.0 - 1.5 %    Immature Grans % 0.3 0.0 - 0.5 %    Neutrophils, Absolute 5.01 1.70 - 7.00 10*3/mm3    Lymphocytes, Absolute 1.83 0.70 - 3.10 10*3/mm3    Monocytes, Absolute 0.40 0.10 - 0.90 10*3/mm3    Eosinophils, Absolute 0.02 0.00 - 0.40 10*3/mm3    Basophils, Absolute 0.01 0.00 - 0.20  10*3/mm3    Immature Grans, Absolute 0.02 0.00 - 0.05 10*3/mm3    nRBC 0.0 0.0 - 0.2 /100 WBC   Respiratory Panel PCR w/COVID-19(SARS-CoV-2) WILLIAM/REJI/MOUNA/PAD/COR/GUILLERMO In-House, NP Swab in UTM/VTM, 2 HR TAT - Swab, Nasopharynx    Collection Time: 03/12/25  4:58 PM    Specimen: Nasopharynx; Swab   Result Value Ref Range    ADENOVIRUS, PCR Not Detected Not Detected    Coronavirus 229E Not Detected Not Detected    Coronavirus HKU1 Not Detected Not Detected    Coronavirus NL63 Not Detected Not Detected    Coronavirus OC43 Not Detected Not Detected    COVID19 Not Detected Not Detected - Ref. Range    Human Metapneumovirus Not Detected Not Detected    Human Rhinovirus/Enterovirus Not Detected Not Detected    Influenza A H1 2009 PCR Detected (A) Not Detected    Influenza B PCR Not Detected Not Detected    Parainfluenza Virus 1 Not Detected Not Detected    Parainfluenza Virus 2 Not Detected Not Detected    Parainfluenza Virus 3 Not Detected Not Detected    Parainfluenza Virus 4 Not Detected Not Detected    RSV, PCR Not Detected Not Detected    Bordetella pertussis pcr Not Detected Not Detected    Bordetella parapertussis PCR Not Detected Not Detected    Chlamydophila pneumoniae PCR Not Detected Not Detected    Mycoplasma pneumo by PCR Not Detected Not Detected         RADIOLOGY  CT Angiogram Chest Pulmonary Embolism  Result Date: 3/12/2025  CTA CHEST WITH IV CONTRAST  HISTORY: History of lung cancer, productive cough and dyspnea  COMPARISON: Chest CT 9/15/2022  TECHNIQUE: CT angiography was performed of the chest with axial images as well as coronal and sagittal reformatted MIP images provided following administration of IV contrast. 3-D surface rendered reformats were obtained of the pulmonary arteries and aorta. Radiation dose reduction techniques were utilized, including automated exposure control, and exposure modulation based on body size.  FINDINGS:  There is a region of somewhat patchy alveolar infiltrate in the  posterior medial right lower lobe, and there is some associated bronchial wall thickening/bronchial narrowing. Somewhat similar though less prominent findings are seen in the right middle lobe as well.  There is no dense pulmonary consolidation, pleural effusion or pneumothorax.  The thoracic aorta is normal in caliber, and there are coronary atherosclerotic vascular calcifications. There is no new or otherwise suspicious mediastinal or hilar adenopathy or mass, though there are chronic soft tissue changes in the right hilum with irregularity of the right mainstem and upper lobe bronchus, unchanged, presumably related to prior therapy.  Images of the upper abdomen show no acute abnormality; a mixed density partially calcified right adrenal nodule is redemonstrated.  There is no acute bony abnormality.  Bolus timing is good and there is no evidence of pulmonary embolism.       Pulmonary arteries are well-opacified, and there is no evidence of pulmonary embolism.  New areas of patchy alveolar type infiltrate in the posterior medial right lower lobe, and to a lesser degree in the right middle lobe, with associated bronchial thickening.    This report was finalized on 3/12/2025 7:27 PM by Dr. Immanuel Hunt M.D on Workstation: [id number]      XR Chest 1 View  Result Date: 3/12/2025  XR CHEST 1 VW-  HISTORY: Female who is 64 years-old, cough  TECHNIQUE: Frontal view of the chest  COMPARISON: 9/15/2022  FINDINGS: Right chest port appears stable. The heart size is borderline. Pulmonary vasculature is unremarkable. No focal pulmonary consolidation, pleural effusion, or pneumothorax. No acute osseous process.      No focal pulmonary consolidation. Follow-up as clinical indications persist.  This report was finalized on 3/12/2025 4:48 PM by Dr. Jose Luis Gregorio M.D on Workstation: [id number]          MEDICATIONS GIVEN IN ER  Medications   sodium chloride 0.9 % flush 10 mL (has no administration in time range)    methylPREDNISolone sodium succinate (SOLU-Medrol) injection 125 mg (125 mg Intravenous Given 3/12/25 1728)   ipratropium-albuterol (DUO-NEB) nebulizer solution 3 mL (3 mL Nebulization Given 3/12/25 1633)   HYDROcodone Bit-Homatrop MBr (HYCODAN) 5-1.5 MG/5ML solution 5 mL (5 mL Oral Given 3/12/25 1700)   iopamidol (ISOVUE-370) 76 % injection 100 mL (88 mL Intravenous Given 3/12/25 1907)   oseltamivir (TAMIFLU) capsule 75 mg (75 mg Oral Given 3/12/25 2003)         ORDERS PLACED DURING THIS VISIT:  Orders Placed This Encounter   Procedures    Respiratory Panel PCR w/COVID-19(SARS-CoV-2) WILLIAM/REJI/MOUNA/PAD/COR/GUILLERMO In-House, NP Swab in UTM/VTM, 2 HR TAT - Swab, Nasopharynx    Blood Culture - Blood,    Blood Culture - Blood,    XR Chest 1 View    CT Angiogram Chest Pulmonary Embolism    Comprehensive Metabolic Panel    Protime-INR    aPTT    BNP    Lactic Acid, Plasma    D-dimer, Quantitative    CBC Auto Differential    Monitor Blood Pressure    Continuous Pulse Oximetry    ECG 12 Lead Dyspnea    Insert Peripheral IV    CBC & Differential         OUTPATIENT MEDICATION MANAGEMENT:  Current Facility-Administered Medications Ordered in Epic   Medication Dose Route Frequency Provider Last Rate Last Admin    sodium chloride 0.9 % flush 10 mL  10 mL Intravenous PRN Manuel Sabillon MD         Current Outpatient Medications Ordered in Epic   Medication Sig Dispense Refill    albuterol (ACCUNEB) 1.25 MG/3ML nebulizer solution Take 3 mL by nebulization Every 6 (Six) Hours As Needed for Wheezing. 360 mL 0    albuterol sulfate  (90 Base) MCG/ACT inhaler INHALE 2 PUFFS BY MOUTH EVERY 6 HOURS AS NEEDED FOR SHORTNESS OF AIR 8.5 g 10    ALPRAZolam (XANAX) 1 MG tablet Take 1 mg by mouth 3 (Three) Times a Day.      amLODIPine (NORVASC) 5 MG tablet Take 1 tablet by mouth Daily. 90 tablet 1    atorvastatin (LIPITOR) 40 MG tablet Take 1 tablet by mouth Daily. 90 tablet 1    buprenorphine-naloxone (SUBOXONE) 8-2 MG per SL tablet Place 1  tablet under the tongue Daily. Instructed to stay on per Dr. Haynes and Dr. Shaw      citalopram (CeleXA) 20 MG tablet Take 20 mg by mouth Daily.      docusate sodium (COLACE) 100 MG capsule Take 100 mg by mouth 2 (Two) Times a Day As Needed for Constipation.      furosemide (LASIX) 40 MG tablet Take 2 tablets by mouth 2 (Two) Times a Day. 180 tablet 1    hydrOXYzine (ATARAX) 25 MG tablet Take 25 mg by mouth 3 (Three) Times a Day As Needed for Itching.      levothyroxine (SYNTHROID, LEVOTHROID) 175 MCG tablet Take 150 mcg by mouth Daily.      lisinopril (PRINIVIL,ZESTRIL) 20 MG tablet Take 1 tablet by mouth Daily. 90 tablet 1    metFORMIN (GLUCOPHAGE) 500 MG tablet Take 1 tablet by mouth 2 (Two) Times a Day With Meals. 180 tablet 1    ondansetron (ZOFRAN) 4 MG tablet Take 4 mg by mouth Every 8 (Eight) Hours As Needed for Nausea or Vomiting.      potassium chloride 10 MEQ CR tablet Take 1 tablet by mouth Daily. 90 tablet 1    predniSONE (DELTASONE) 20 MG tablet Take 3 tablets by mouth Daily. (Patient taking differently: Take 60 mg by mouth Daily. PT HAS NOT STARTED TAKING) 15 tablet 0    traZODone (DESYREL) 150 MG tablet Take 150 mg by mouth Every Night.           PROCEDURES  Procedures            PROGRESS, DATA ANALYSIS, CONSULTS, AND MEDICAL DECISION MAKING  All labs have been independently interpreted by me.  All radiology studies have been reviewed by me. All EKGs have been independently viewed and interpreted by me.  Discussion below represents my analysis of pertinent findings related to patient's condition, differential diagnosis, treatment plan and final disposition.    Differential diagnosis:   My differential diagnosis for cough includes but is not limited to:  Upper respiratory infection, bronchitis, pneumonia, COPD exacerbation, cough variant asthma, cardiac asthma, coronary artery disease, congestive heart failure, bacterial, viral or lung infections, lung cancer, aspiration pneumonitis, aspiration  of foreign body and Covid -19        Clinical Scores:                  ED Course as of 03/12/25 2010   Wed Mar 12, 2025   1635 RADIOLOGY      Study: Single view chest  Findings: Port present.  Hazy area right lower lateral lung field.  I independently viewed and interpreted these images contemporaneously with treatment.    [RS]   1642 EKG           EKG time: 1639  Rhythm/Rate: Sinus, 70  P waves and MN: SALLY within normal limits  QRS, axis: Narrow complex  ST and T waves: No STEMI; QTc within normal limits    Interpreted Contemporaneously by me, independently viewed  Comparison: 9/15/2022   [RS]   1821 D-Dimer, Quant(!): 1.13 [RS]   1822 Glucose: 91 [RS]   1822 BUN(!): 6 [RS]   1822 Creatinine: 0.62 [RS]   1822 Sodium: 141 [RS]   1822 Potassium(!): 3.2 [RS]   1822 INR(!): 1.26 [RS]   1822 Lactate: 1.2 [RS]   1822 PTT: 27.7 [RS]   1822 proBNP: 60.1 [RS]   1822 WBC: 7.29 [RS]   1822 Hemoglobin: 14.4 [RS]   1824 Reviewed all findings with patient.  She is feeling a bit better after Hycodan and breathing treatment.  Recommend CTA of the chest.  Patient agreeable. [RS]   1932 Influenza A H1 2009 PCR(!): Detected [RS]   1957 Discussed all findings with patient.  She was staying overnight tonight.  We will consult with ED observation unit for admission.  ABG ordered. [RS]   2000 CONSULT        Provider: ANDRES Etienne    Discussion: Reviewed patient history, ED presentation and evaluation.  She will come see the patient in the ED before she accept the patient to observation unit.  I think this is reasonable.           [RS]   2003 Moved the pulse oximetry probe to her forehead she is 94% on room air. [RS]   2010 Patient accepted for admission to the observation unit. [RS]      ED Course User Index  [RS] Manuel Sabillon MD         Prescription drug monitoring program review:     AS OF 20:10 EDT VITALS:    BP - 139/93  HR - 84  TEMP - 98.3 °F (36.8 °C) (Oral)  O2 SATS - 90%    COMPLEXITY OF CARE  The patient requires  admission.      DIAGNOSIS  Final diagnoses:   Acute exacerbation of chronic obstructive pulmonary disease (COPD)   Influenza A   Smoker         DISPOSITION  ED Disposition       ED Disposition   Decision to Admit    Condition   --    Comment   --                  ADMISSION    Discussed treatment plan and reason for admission with pt/family and admitting physician.  Pt/family voiced understanding of the plan for admission for further testing/treatment as needed.       Please note that portions of this document were completed with a voice recognition program.    Note Disclaimer: At [facility], we believe that sharing information builds trust and better relationships. You are receiving this note because you recently visited [facility]. It is possible you will see health information before a provider has talked with you about it. This kind of information can be easy to misunderstand. To help you fully understand what it means for your health, we urge you to discuss this note with your provider.         Manuel Sabillon MD  03/12/25 2010      Electronically signed by Manuel Sabillon MD at 03/12/25 2010       Anthony Ritchie RN at 03/12/25 1520          Pt from home via ems, called for SOA/cough that got worse today, dx'd with PNA last week at [facility] [facility], has completed antibiotics; flu exposure in the home    Electronically signed by Anthony Ritchie RN at 03/12/25 1522          Physician Progress Notes (last 4 days)        Collin Del Rio MD at 03/16/25 1341          Dr. DOC Del Rio    [address]        Patient ID:  Name:  Katharina Torres  MRN:  [id number]  1960  64 y.o.  female            CC/Reason for visit: Influenza infection, COPD exacerbation, active tobacco user/smoker    Interval hx: Still coughing feels better.  Not requiring oxygen  Walking oximetry showed she needs 2 L of oxygen during exertion but not at rest    ROS: No chest pain, no abdominal pain    Vitals:  Vitals:    03/16/25  0637 03/16/25 0710 03/16/25 1039 03/16/25 1150   BP:  133/90     BP Location:  Right arm     Patient Position:  Lying     Pulse: 71 70 69 72   Resp:  16 16    Temp:  97.9 °F (36.6 °C)     TempSrc:  Oral     SpO2: 99% 99% 90% 90%   Weight:       Height:               Body mass index is 30.06 kg/m².    Intake/Output Summary (Last 24 hours) at 3/16/2025 1341  Last data filed at 3/16/2025 1200  Gross per 24 hour   Intake 868 ml   Output --   Net 868 ml       Exam:  GEN:  No distress  Alert, oriented x 3.   LUNGS: Diminished breath sounds and a few rhonchi sounds bilat, no use of accessory muscles  CV:  Normal S1S2, without murmur, no edema  ABD:  Non tender, no enlarged liver or masses      Scheduled meds:  atorvastatin, 40 mg, Oral, Daily  benzonatate, 200 mg, Oral, TID  budesonide-formoterol, 2 puff, Inhalation, BID - RT  buprenorphine-naloxone, 2 tablet, Sublingual, Daily  citalopram, 20 mg, Oral, Daily  dextromethorphan polistirex ER, 60 mg, Oral, Q12H  enoxaparin sodium, 40 mg, Subcutaneous, Q24H  furosemide, 40 mg, Oral, Daily  guaiFENesin, 1,200 mg, Oral, Q12H  ipratropium-albuterol, 3 mL, Nebulization, 4x Daily - RT  levothyroxine, 175 mcg, Oral, Q AM  nicotine, 1 patch, Transdermal, Nightly  oseltamivir, 75 mg, Oral, Q12H  oxymetazoline, 2 spray, Each Nare, BID  potassium chloride, 10 mEq, Oral, Daily  sodium chloride, 10 mL, Intravenous, Q12H      IV meds:                           Data Review:   I reviewed the patient's medications and new clinical results.            Results from last 7 days   Lab Units 03/14/25  1724 03/13/25  0348 03/12/25  1654   SODIUM mmol/L 139 142 141   POTASSIUM mmol/L 3.7 4.2 3.2*   CHLORIDE mmol/L 101 105 101   CO2 mmol/L 26.0 24.7 25.3   BUN mg/dL 12 5* 6*   CREATININE mg/dL 0.62 0.45* 0.62   CALCIUM mg/dL 8.5* 8.8 9.0   BILIRUBIN mg/dL  --   --  0.5   ALK PHOS U/L  --   --  89   ALT (SGPT) U/L  --   --  20   AST (SGOT) U/L  --   --  26   GLUCOSE mg/dL 94 123* 91   WBC 10*3/mm3  10.54 8.52 7.29   HEMOGLOBIN g/dL 14.5 14.1 14.4   PLATELETS 10*3/mm3 187 182 172   INR   --   --  1.26*   PROBNP pg/mL  --   --  60.1     Results from last 7 days   Lab Units 03/12/25  1655   BLOODCX  No growth at 3 days  No growth at 3 days             Results from last 7 days   Lab Units 03/12/25  2035   PH, ARTERIAL pH units 7.483*   PCO2, ARTERIAL mm Hg 35.4   PO2 ART mm Hg 54.9*   O2 SATURATION ART % 90.5*   MODALITY  Room Air            ASSESSMENT:     Influenza A    Chronic narcotic use    Hypothyroidism (acquired)    Primary hypertension    Tobacco abuse    COPD with acute exacerbation        PLAN:  Patient is still smoking cigarettes.  I advised her to quit.  She was admitted here for COPD exacerbation caused by influenza infection.  She received symptomatic/supportive care and did well.  She does not have a pulmonologist.  She would like to follow-up with us in the office.  I will have her see HENRY Salazar within 1 week in our pulmonary clinic.  Patient can be discharged home on Symbicort or equivalent inhaler, which ever is cheapest under her insurance plan.          Collin Del Rio MD  3/16/2025    Electronically signed by Collin Del Rio MD at 03/16/25 1348       Collin Del Rio MD at 03/15/25 1433          Dr. DOC Del Rio    [address]        Patient ID:  Name:  Katharina Torres  MRN:  [id number]  1960  64 y.o.  female            CC/Reason for visit: Influenza A infection, hypoxemia    Interval hx: Less short of breath.  Still coughing.  On 3 L of oxygen saturating 93%    ROS: No chest pain, no abdominal pain    I reviewed old medical records.  Past medical history, social history and family history: Unchanged from admission H&P.      Vitals:  Vitals:    03/15/25 0828 03/15/25 1035 03/15/25 1040 03/15/25 1305   BP: 126/79   141/85   BP Location: Right arm   Right arm   Patient Position: Lying   Sitting   Pulse:  70 66 79   Resp:  16  16   Temp:    97.9 °F (36.6 °C)    TempSrc:    Oral   SpO2: 93% 97% 99% 93%   Weight:       Height:               Body mass index is 29.1 kg/m².    Intake/Output Summary (Last 24 hours) at 3/15/2025 1433  Last data filed at 3/15/2025 0950  Gross per 24 hour   Intake 118 ml   Output --   Net 118 ml       Exam:  GEN:  No distress  Alert, oriented x 3.   LUNGS: Scattered coarse breath sounds bilat, no use of accessory muscles  CV:  Normal S1S2, without murmur, no edema  ABD:  Non tender, no enlarged liver or masses      Scheduled meds:  atorvastatin, 40 mg, Oral, Daily  budesonide-formoterol, 2 puff, Inhalation, BID - RT  buprenorphine-naloxone, 2 tablet, Sublingual, Daily  citalopram, 20 mg, Oral, Daily  furosemide, 40 mg, Oral, Daily  guaiFENesin, 1,200 mg, Oral, Q12H  ipratropium-albuterol, 3 mL, Nebulization, 4x Daily - RT  levothyroxine, 175 mcg, Oral, Q AM  nicotine, 1 patch, Transdermal, Nightly  oseltamivir, 75 mg, Oral, Q12H  oxymetazoline, 2 spray, Each Nare, BID  potassium chloride, 10 mEq, Oral, Daily  sodium chloride, 10 mL, Intravenous, Q12H      IV meds:                           Data Review:   I reviewed the patient's medications and new clinical results.    COVID19   Date Value Ref Range Status   03/12/2025 Not Detected Not Detected - Ref. Range Final         Lab Results   Component Value Date    CALCIUM 8.5 (L) 03/14/2025    PHOS 2.5 10/25/2021    MG 2 06/01/2023    MG 2.0 09/15/2022    MG 2.1 07/18/2022     Results from last 7 days   Lab Units 03/14/25  1724 03/13/25  0348 03/12/25  1654   SODIUM mmol/L 139 142 141   POTASSIUM mmol/L 3.7 4.2 3.2*   CHLORIDE mmol/L 101 105 101   CO2 mmol/L 26.0 24.7 25.3   BUN mg/dL 12 5* 6*   CREATININE mg/dL 0.62 0.45* 0.62   CALCIUM mg/dL 8.5* 8.8 9.0   BILIRUBIN mg/dL  --   --  0.5   ALK PHOS U/L  --   --  89   ALT (SGPT) U/L  --   --  20   AST (SGOT) U/L  --   --  26   GLUCOSE mg/dL 94 123* 91   WBC 10*3/mm3 10.54 8.52 7.29   HEMOGLOBIN g/dL 14.5 14.1 14.4   PLATELETS 10*3/mm3 187 182 172   INR    --   --  1.26*   PROBNP pg/mL  --   --  60.1     Results from last 7 days   Lab Units 25  1655   BLOODCX  No growth at 2 days  No growth at 2 days             Results from last 7 days   Lab Units 25   PH, ARTERIAL pH units 7.483*   PCO2, ARTERIAL mm Hg 35.4   PO2 ART mm Hg 54.9*   O2 SATURATION ART % 90.5*   MODALITY  Room Air              ASSESSMENT:     Influenza A    COPD with acute exacerbation        PLAN:  Patient is actually no longer wheezing.  May switch to inhaled steroids rather than systemic steroids.  Continue DuoNeb treatments and supportive care for influenza infection  On Afrin for significant sinus congestion  She was advised to quit smoking.  On nicotine patch      I reviewed the chart and other providers notes and reviewed labs.  Copied text in this note has been reviewed and is accurate as of today      Collin Del Rio MD  3/15/2025    Electronically signed by Collin Del Rio MD at 03/15/25 1436       Ibrahima Lundy MD at 03/15/25 1232              Name: Katharina Torres ADMIT: 3/12/2025   : 1960  PCP: Provider, No Known    MRN: [id number] LOS: 1 days   AGE/SEX: 64 y.o. female  ROOM: [facility]   Subjective   Chief Complaint   Patient presents with    Cough     Still with cough  On bronchodilators  On oxygen  Does not wear oxygen at home    ROS  No f/c  No n/v  No cp/palp  + soa/cough    Objective   Vital Signs  Temp:  [97.9 °F (36.6 °C)-98.5 °F (36.9 °C)] 97.9 °F (36.6 °C)  Heart Rate:  [66-84] 72  Resp:  [16-20] 16  BP: (109-155)/(74-91) 141/85  SpO2:  [92 %-100 %] 100 %  on  Flow (L/min) (Oxygen Therapy):  [2-3] 2;   Device (Oxygen Therapy): nasal cannula  Body mass index is 29.1 kg/m².    Physical Exam  Constitutional:       General: She is not in acute distress.  HENT:      Head: Normocephalic and atraumatic.   Eyes:      General: No scleral icterus.  Cardiovascular:      Rate and Rhythm: Regular rhythm.      Heart sounds: Normal heart sounds.   Pulmonary:       "Effort: Pulmonary effort is normal. No respiratory distress.      Breath sounds: Rhonchi (minimal, cough during exam) present.   Abdominal:      General: There is no distension.      Palpations: Abdomen is soft.   Musculoskeletal:      Cervical back: Neck supple.   Neurological:      Mental Status: She is alert.   Psychiatric:         Behavior: Behavior normal.     Chronically ill     Results Review:       I reviewed the patient's new clinical results.  Results from last 7 days   Lab Units 03/14/25  1724 03/13/25  0348 03/12/25  1654   WBC 10*3/mm3 10.54 8.52 7.29   HEMOGLOBIN g/dL 14.5 14.1 14.4   PLATELETS 10*3/mm3 187 182 172     Results from last 7 days   Lab Units 03/14/25  1724 03/13/25  0348 03/12/25  1654   SODIUM mmol/L 139 142 141   POTASSIUM mmol/L 3.7 4.2 3.2*   CHLORIDE mmol/L 101 105 101   CO2 mmol/L 26.0 24.7 25.3   BUN mg/dL 12 5* 6*   CREATININE mg/dL 0.62 0.45* 0.62   GLUCOSE mg/dL 94 123* 91   Estimated Creatinine Clearance: 94.5 mL/min (by C-G formula based on SCr of 0.62 mg/dL).  Results from last 7 days   Lab Units 03/12/25  1654   ALBUMIN g/dL 4.3   BILIRUBIN mg/dL 0.5   ALK PHOS U/L 89   AST (SGOT) U/L 26   ALT (SGPT) U/L 20     Results from last 7 days   Lab Units 03/14/25  1724 03/13/25  0348 03/12/25  1654   CALCIUM mg/dL 8.5* 8.8 9.0   ALBUMIN g/dL  --   --  4.3     Results from last 7 days   Lab Units 03/12/25  1654   LACTATE mmol/L 1.2       Coag   Results from last 7 days   Lab Units 03/12/25  1654   INR  1.26*   APTT seconds 27.7     HbA1C   Lab Results   Component Value Date    HGBA1C 6.70 (H) 09/26/2022    HGBA1C 5.2 07/18/2021    HGBA1C 5.5 04/19/2020     Infection   Results from last 7 days   Lab Units 03/12/25  1655   BLOODCX  No growth at 3 days  No growth at 3 days     Radiology(recent) No radiology results for the last day  No results found for: \"TROPONINT\", \"TROPONINI\", \"BNP\"  No components found for: \"TSH;2\"    atorvastatin, 40 mg, Oral, Daily  budesonide-formoterol, 2 " puff, Inhalation, BID - RT  buprenorphine-naloxone, 2 tablet, Sublingual, Daily  citalopram, 20 mg, Oral, Daily  furosemide, 40 mg, Oral, Daily  guaiFENesin, 1,200 mg, Oral, Q12H  ipratropium-albuterol, 3 mL, Nebulization, 4x Daily - RT  levothyroxine, 175 mcg, Oral, Q AM  nicotine, 1 patch, Transdermal, Nightly  oseltamivir, 75 mg, Oral, Q12H  oxymetazoline, 2 spray, Each Nare, BID  potassium chloride, 10 mEq, Oral, Daily  sodium chloride, 10 mL, Intravenous, Q12H       Diet: Cardiac; Healthy Heart (2-3 Na+); Fluid Consistency: Thin (IDDSI 0)      Assessment & Plan     Active Hospital Problems    Diagnosis  POA    **Influenza A [J10.1]  Yes    COPD with acute exacerbation [J44.1]  Yes    Hypothyroidism (acquired) [E03.9]  Yes    Primary hypertension [I10]  Yes    Tobacco abuse [Z72.0]  Yes      Resolved Hospital Problems   No resolved problems to display.     63 yo with history of COPD, HLD, HTN, bipolar disorder, metastatic lung cancer, tobacco use who presented with cough, dyspnea. Diagnosed with influenza    Currently on tamiflu and bronchodilators  CT and LE doppler negative for VTE  Resume home synthroid  On chronic pain medication  Hopefully can wean oxygen and dc tomorrow though may need home oxygen  Add agents for cough- though may have persistent post viral cough      DW staff  Reviewed records      Ibrahima Lundy MD  [facility]  03/15/25  12:32 EDT    Electronically signed by Ibrahima Lundy MD at 03/15/25 1811       Donald East MD at 03/14/25 1501            Daily Progress Note.   [facility] OBSERVATION  3/14/2025    Patient:  Name:  Katharina Torres  MRN:  [id number]  1960  64 y.o.  female         Reason for Consultation:  influenza A, hypoxia     CC: cough, shortness of breath     History of Present Illness:  Katharina Torres is a 64 year old female with a past medical history of COPD, hyperlipidemia, hypertension, hypothyroidism, substance abuse (hx  "of methamphetamine), bipolar 1 disorder, and history of lung cancer with metastasis to brain status post radiation and immunotherapy Keytruda (currently in remission). Current ongoing tobacco use.    Interval History:  Some cough sputum no hemoptysis  Sig sinus headache congestion drainage  No lethargy, awake alert       Physical Exam:  /66 (BP Location: Right arm, Patient Position: Lying)   Pulse 83   Temp 98.6 °F (37 °C) (Oral)   Resp 18   Ht 162.6 cm (64\")   Wt 83.9 kg (185 lb)   SpO2 92%   BMI 31.76 kg/m²   Body mass index is 31.76 kg/m².  No intake or output data in the 24 hours ending 03/14/25 1502  General appearance: ill but non toxic, conversant   Eyes: anicteric sclerae, moist conjunctivae; no lidlag;    HENT: Atraumatic; oropharynx clear with moist mucous membranes    Neck: Trachea midline;  supple   Lungs: No expiratory wheeze no sig rhonchi, with normal respiratory effort and no intercostal retractions  CV: RRR, no rub   Abdomen: Soft, nontender; BS+  Extremities: No peripheral edema or ext lad  Skin: WWP no diffuse visible rash  Psych: Appropriate affect, alert   Neuro: Moves all ext. CN II-XII. Speech intact.    Data Review:  Notable Labs:  Results from last 7 days   Lab Units 03/13/25  0348 03/12/25  1654   WBC 10*3/mm3 8.52 7.29   HEMOGLOBIN g/dL 14.1 14.4   PLATELETS 10*3/mm3 182 172     Results from last 7 days   Lab Units 03/13/25  0348 03/12/25  1654   SODIUM mmol/L 142 141   POTASSIUM mmol/L 4.2 3.2*   CHLORIDE mmol/L 105 101   CO2 mmol/L 24.7 25.3   BUN mg/dL 5* 6*   CREATININE mg/dL 0.45* 0.62   GLUCOSE mg/dL 123* 91   CALCIUM mg/dL 8.8 9.0   Estimated Creatinine Clearance: 135.9 mL/min (A) (by C-G formula based on SCr of 0.45 mg/dL (L)).    Results from last 7 days   Lab Units 03/13/25  0348 03/12/25  1654   AST (SGOT) U/L  --  26   ALT (SGPT) U/L  --  20   LACTATE mmol/L  --  1.2   D DIMER QUANT MCGFEU/mL  --  1.13*   PLATELETS 10*3/mm3 182 172       Results from last 7 days "   Lab Units 03/12/25 2035   PH, ARTERIAL pH units 7.483*   PCO2, ARTERIAL mm Hg 35.4   PO2 ART mm Hg 54.9*   HCO3 ART mmol/L 26.5       Imaging:  Reviewed chest images personally from past 3 days    A/P:  Influenza A  COPD without acute exacerbation  Acute hypoxemia  Hypokalemia  Ongoing tobacco abuse  Hypothyroidism  Bipolar disorder  History of lung cancer, currently in remission        Admitted to observation for management of COPD exacerbation/ influenza A.  Tamiflu      supportive care.  Continue supplemental oxygen as needed to maintain SpO2 >90%.  No active wheezing on exam- continue scheduled bronchodilators.     Sinus congestion 3 days afrin  Saline nasal spray    Okay to give steroids if worsening wheezing or chest tightness it is okay to treat with steroids in patients with exacerbations of COPD secondary to flu however right now no wheeze    Tobacco cessation education provided- nicotine patch and PRN nicotine gum ordered.     Significant calf pain sent for duplex venous lower extremity for completeness  Discussed with bedside nursing     Electronically signed by Donald East MD, 03/14/25, 3:20 PM EDT.          Electronically signed by Donald East MD at 03/14/25 1520       Augie Waite MD at 03/14/25 0906          LOU FLORENCE Attestation Note    SHARED VISIT: This visit was performed by BOTH a physician and an APC. The substantive portion of the medical decision making was performed by this attesting physician who made or approved the management plan and takes responsibility for patient management. All studies in the APC note (if performed) were independently interpreted by me.     History:  Patient with history of COPD is admitted to observation unit for further management of influenza A illness.  This morning she is feeling a little bit better.  She had a significant coughing spell overnight and was given a dose of Hycodan.  That has provided some relief this morning.  She is having  some occasional pain in the lower chest and in the ribs with her coughing and deep breathing.  But no pain at rest.  No fevers overnight.    Physical Exam:  General: No acute distress.  HENT: NCAT  Eyes: no scleral icterus.  Neck: Painless range of motion  CV: Pink warm and well-perfused throughout  Respiratory: Normal work of breathing.  Breath sounds are diminished at the bilateral bases.  There are faint wheezes on the right side.  No wheezes on the left.  No stridor.  Occasional cough with deep breathing.  Abdomen: soft, no focal tenderness or rigidity  Musculoskeletal: no deformity.  Neuro: Alert, speech fluent and easily intelligible  Skin: warm, dry.    Assessment and Plan:  Influenza A/COPD exacerbation: Continue DuoNeb therapy.  Continue Tamiflu.  Steroids as recommended by pulmonology.  Continuing supplemental nasal cannula oxygen.  Would like to try to wean her back down to room air if possible today.  Consider checking ambulatory O2 sat if she continues to improve.  Pulmonology following.    Bilateral calf pain: Presumed musculoskeletal.  Duplex venous ultrasound negative for DVT bilaterally.    Tobacco abuse: NicoDerm patch    Hypothyroidism: Continue home dose Synthroid.      Electronically signed by Augie Waite MD at 03/14/25 0945       Justice Alanis III, PA at 03/14/25 8918       Attestation signed by Augie Waite MD at 03/14/25 2032        SHARED APC FACE TO FACE: I performed a substantive part of the MDM during the patient's E/M visit. I personally evaluated and examined the patient. I personally made or approved the documented management plan and acknowledge its risk of complications.   Augie Waite MD 3/14/2025 20:31 EDT                         ED OBSERVATION PROGRESS/DISCHARGE SUMMARY    Date of Admission: 3/12/2025   LOS: 0 days   PCP: Provider, No Known    Working diagnosis: influenza A, COPD exacerbation, history of lung cancer in remission being treated with Keytruda,  hypokalemia, hypoxemia, bipolar disorder      Subjective     Hospital Outcome:     Katharina Torres is a 64 y.o. female who was admitted to the ED observation unit for an acute COPD exacerbation.  In the ED patient was found to have influenza A.  She reports she has had an ongoing productive cough for roughly 3 months.  For the last 36 hours she has had significant body aches.  There is no associated chest pain, palpitations or GI symptoms.  Pulmonology is following.     Pulmonology recommends holding on glucocorticoids for now as it has shown increased mortality in patients with influenza.  Continue with Tamiflu and continue bronchodilators.  Tobacco cessation discussed yet.     3/14/2025: No acute events overnight.  Patient weaned to 2 L of oxygen per nasal cannula.  Pulmonology continues to follow.    4:00 PM.  Patient has been in the ED observation unit for 50 hours.  She continues to require 3 L of O2.  She is being treated with Tamiflu, Zithromax, DuoNebs every 4, albuterol mini nebs as needed, Mucinex 1200 mg twice daily with continuous monitoring.  At this point, she is clearly going to need more time than ED observation will allow.  Will place call to the hospitalist to discuss admission.    4:27 PM.  Discussed patient's case with Dr. Parker.  To admit to her care for further management.  Pulmonology continues to recommend the above but have stopped the steroids for now due to no wheeze.   Should patient start to wheeze again okay to restart steroids.  To continue with other scheduled medication above.  Pulmonology continues to follow.    ROS:  General: Chills and myalgias.  Respiratory: Shortness of breath  Cardiovascular: no chest pain, palpitations  Abdomen: No abdominal pain, nausea, vomiting, or diarrhea  Neurologic: No focal weakness    Objective   Physical Exam:  I have reviewed the vital signs.  Temp:  [98.2 °F (36.8 °C)-98.6 °F (37 °C)] 98.6 °F (37 °C)  Heart Rate:  [72-94] 83  Resp:  [18-20] 18  BP:  ()/(66-82) 106/66  General Appearance:    Alert, cooperative, no distress  Head:    Normocephalic, atraumatic  Eyes:    Sclerae anicteric  Neck:   Supple, no mass  Lungs: Clear to auscultation bilaterally, respirations unlabored  Heart: Regular rate and rhythm, S1 and S2 normal, no murmur, rub or gallop  Abdomen:  Soft, nontender, bowel sounds active, nondistended  Extremities: No clubbing, cyanosis, or edema to lower extremities  Pulses:  2+ and symmetric in distal lower extremities  Skin: No rashes   Neurologic: Oriented x3, Normal strength to extremities    Results Review:    I have reviewed the labs, radiology results and diagnostic studies.    Results from last 7 days   Lab Units 03/13/25  0348   WBC 10*3/mm3 8.52   HEMOGLOBIN g/dL 14.1   HEMATOCRIT % 44.8   PLATELETS 10*3/mm3 182     Results from last 7 days   Lab Units 03/13/25  0348 03/12/25  1654   SODIUM mmol/L 142 141   POTASSIUM mmol/L 4.2 3.2*   CHLORIDE mmol/L 105 101   CO2 mmol/L 24.7 25.3   BUN mg/dL 5* 6*   CREATININE mg/dL 0.45* 0.62   CALCIUM mg/dL 8.8 9.0   BILIRUBIN mg/dL  --  0.5   ALK PHOS U/L  --  89   ALT (SGPT) U/L  --  20   AST (SGOT) U/L  --  26   GLUCOSE mg/dL 123* 91     Imaging Results (Last 24 Hours)       ** No results found for the last 24 hours. **            I have reviewed the medications.     Discharge Medications        ASK your doctor about these medications        Instructions Start Date   albuterol 1.25 MG/3ML nebulizer solution  Commonly known as: ACCUNEB   1.25 mg, Nebulization, Every 6 Hours PRN      albuterol sulfate  (90 Base) MCG/ACT inhaler  Commonly known as: PROVENTIL HFA;VENTOLIN HFA;PROAIR HFA   INHALE 2 PUFFS BY MOUTH EVERY 6 HOURS AS NEEDED FOR SHORTNESS OF AIR      ALPRAZolam 1 MG tablet  Commonly known as: XANAX   1 mg, Oral, 3 Times Daily      atorvastatin 40 MG tablet  Commonly known as: LIPITOR   40 mg, Oral, Daily      buprenorphine-naloxone 8-2 MG per SL tablet  Commonly known as: SUBOXONE    1 tablet, Daily      citalopram 20 MG tablet  Commonly known as: CeleXA   20 mg, Daily      docusate sodium 100 MG capsule  Commonly known as: COLACE   100 mg, Oral, 2 Times Daily PRN      furosemide 40 MG tablet  Commonly known as: LASIX   80 mg, Oral, 2 Times Daily      hydrOXYzine 25 MG tablet  Commonly known as: ATARAX   25 mg, Oral, 3 Times Daily PRN      levothyroxine 175 MCG tablet  Commonly known as: SYNTHROID, LEVOTHROID   150 mcg, Daily      lisinopril 20 MG tablet  Commonly known as: PRINIVIL,ZESTRIL   20 mg, Oral, Daily      metFORMIN 500 MG tablet  Commonly known as: GLUCOPHAGE   500 mg, Oral, 2 Times Daily With Meals      ondansetron 4 MG tablet  Commonly known as: ZOFRAN   4 mg, Every 8 Hours PRN      potassium chloride 10 MEQ CR tablet   10 mEq, Oral, Daily      predniSONE 20 MG tablet  Commonly known as: DELTASONE   60 mg, Oral, Daily      traZODone 150 MG tablet  Commonly known as: DESYREL   150 mg, Nightly              ---------------------------------------------------------------------------------------------  Assessment & Plan   Assessment/Problem List    Influenza A      Plan:    Influenza A  COPD exacerbation  History of lung cancer-currently in remission being treated with Keytruda  -Cardiac monitor  -Vital signs every 4 hours  -Continuous pulse ox  -Mucinex 1200 mg p.o. twice daily  -DuoNebs every 4 hours  -Albuterol mini nebs as needed  -Incentive spirometer every 4 hours while awake  -Tamiflu 75 mg p.o. twice daily for 5 days  -Zithromax 500 mg p.o. nightly x 3 days  -Pulmonology following with plan above  -Admission to the hospitalist for further management.     Tobacco abuse  -NicoDerm patch  -As needed Nicorette gum     Hypothyroidism  -Continue home dose Synthroid    Disposition: Admission    Follow-up after Discharge: Pending    This note will serve as a progress note    Justice Alanis III, PA 03/14/25 16:34 EDT    I have worn appropriate PPE during this patient encounter,  sanitized my hands both with entering and exiting patient's room.      52 minutes has been spent by [facility] providers in the care of this patient while under observation status              Electronically signed by Augie Waite MD at 25          Consult Notes (last 4 days)        Kayla Parker MD at 25          CONSULT NOTE    INTERNAL MEDICINE   [facility]       Patient Identification:  Name: Katharina Torres  Age: 64 y.o.  Sex: female  :  1960  MRN: [id number]             Date of Consultation: 25          Primary Care Physician: Provider, No Known                               Requesting Physician: dr waite  Reason for Consultation:assuming care    Chief Complaint:  64 year old female presented to the ED with shortness of breath; she was diagnosed with influenza and sent to the observation unit; she also has a copd exacerbation; she was seen by pulmonology and admission was requested due to lack of progress; she continues to smoke    History of Present Illness:   As above      Past Medical History:  Past Medical History:   Diagnosis Date    Addiction     ALCOHOL LAST USED 1 YEAR AGO, AMPHETAMINES LAST USED 22, PILLS PERCOCET LAST USED 2022    Afib     APPROX 1 YEAR AGO - [facility]    Allergic     Anxiety     Arthritis     KNEE, RIGHT KNEE    Bipolar 1 disorder     Cancer     LUNG CA/ BRAIN- RADIATION    COPD (chronic obstructive pulmonary disease)     Depression     Diabetes mellitus     DVT, bilateral lower limbs     Hyperlipidemia     Hypertension     Pneumonia     Thyroid disease      Past Surgical History:  Past Surgical History:   Procedure Laterality Date    CARPAL TUNNEL RELEASE      HEEL SPUR SURGERY Left     VENOUS ACCESS DEVICE (PORT) INSERTION      FOR CHEMO      Home Meds:  Medications Prior to Admission   Medication Sig Dispense Refill Last Dose/Taking    albuterol (ACCUNEB) 1.25 MG/3ML  nebulizer solution Take 3 mL by nebulization Every 6 (Six) Hours As Needed for Wheezing. 360 mL 0 3/12/2025 Evening    albuterol sulfate  (90 Base) MCG/ACT inhaler INHALE 2 PUFFS BY MOUTH EVERY 6 HOURS AS NEEDED FOR SHORTNESS OF AIR 8.5 g 10 3/12/2025 Morning    atorvastatin (LIPITOR) 40 MG tablet Take 1 tablet by mouth Daily. 90 tablet 1 3/12/2025 Morning    buprenorphine-naloxone (SUBOXONE) 8-2 MG per SL tablet Place 1 tablet under the tongue Daily. Instructed to stay on per Dr. Haynes and Dr. Shaw   3/12/2025 Morning    citalopram (CeleXA) 20 MG tablet Take 1 tablet by mouth Daily.   3/12/2025 Morning    furosemide (LASIX) 40 MG tablet Take 2 tablets by mouth 2 (Two) Times a Day. 180 tablet 1 3/12/2025 Morning    levothyroxine (SYNTHROID, LEVOTHROID) 175 MCG tablet Take 150 mcg by mouth Daily.   3/12/2025 Morning    metFORMIN (GLUCOPHAGE) 500 MG tablet Take 1 tablet by mouth 2 (Two) Times a Day With Meals. 180 tablet 1 3/12/2025 Morning    ondansetron (ZOFRAN) 4 MG tablet Take 1 tablet by mouth Every 8 (Eight) Hours As Needed for Nausea or Vomiting.   3/11/2025 Bedtime    potassium chloride 10 MEQ CR tablet Take 1 tablet by mouth Daily. 90 tablet 1 3/12/2025 Morning    traZODone (DESYREL) 150 MG tablet Take 1 tablet by mouth Every Night.   3/11/2025 Bedtime    ALPRAZolam (XANAX) 1 MG tablet Take 1 tablet by mouth 3 (Three) Times a Day.   More than a month    docusate sodium (COLACE) 100 MG capsule Take 1 capsule by mouth 2 (Two) Times a Day As Needed for Constipation.   More than a month    hydrOXYzine (ATARAX) 25 MG tablet Take 1 tablet by mouth 3 (Three) Times a Day As Needed for Itching.   More than a month    lisinopril (PRINIVIL,ZESTRIL) 20 MG tablet Take 1 tablet by mouth Daily. 90 tablet 1 Unknown    predniSONE (DELTASONE) 20 MG tablet Take 3 tablets by mouth Daily. (Patient taking differently: Take 60 mg by mouth Daily. PT HAS NOT STARTED TAKING) 15 tablet 0      Current Meds:     Current  Facility-Administered Medications:     acetaminophen (TYLENOL) tablet 650 mg, 650 mg, Oral, Q4H PRN, 650 mg at 03/14/25 2040 **OR** acetaminophen (TYLENOL) 160 MG/5ML oral solution 650 mg, 650 mg, Oral, Q4H PRN **OR** acetaminophen (TYLENOL) suppository 650 mg, 650 mg, Rectal, Q4H PRN, Bette Denise APRN    albuterol (PROVENTIL) nebulizer solution 0.083% 2.5 mg/3mL, 2.5 mg, Nebulization, Q6H PRN, Bette Denise APRN    sennosides-docusate (PERICOLACE) 8.6-50 MG per tablet 2 tablet, 2 tablet, Oral, BID PRN **AND** polyethylene glycol (MIRALAX) packet 17 g, 17 g, Oral, Daily PRN **AND** bisacodyl (DULCOLAX) EC tablet 5 mg, 5 mg, Oral, Daily PRN **AND** bisacodyl (DULCOLAX) suppository 10 mg, 10 mg, Rectal, Daily PRN, Bette Denise APRN    budesonide-formoterol (SYMBICORT) 160-4.5 MCG/ACT inhaler 2 puff, 2 puff, Inhalation, BID - RT, Donald East MD, 2 puff at 03/14/25 2035    buprenorphine-naloxone (SUBOXONE) 8-2 MG per SL tablet 2 tablet, 2 tablet, Sublingual, Daily, Justiec Alanis III, PA, 2 tablet at 03/14/25 1248    calcium carbonate (TUMS) chewable tablet 500 mg (200 mg elemental), 2 tablet, Oral, TID PRN, Bette Denise APRN    Calcium Replacement - Follow Nurse / BPA Driven Protocol, , Not Applicable, PRN, Bette Denise APRN    citalopram (CeleXA) tablet 20 mg, 20 mg, Oral, Daily, Justice Alanis III PA, 20 mg at 03/14/25 0845    furosemide (LASIX) tablet 40 mg, 40 mg, Oral, Daily, Bette Denise APRN, 40 mg at 03/14/25 0845    guaiFENesin (MUCINEX) 12 hr tablet 1,200 mg, 1,200 mg, Oral, Q12H, Bette Denise APRN, 1,200 mg at 03/14/25 2040    HYDROcodone Bit-Homatrop MBr (HYCODAN) 5-1.5 MG/5ML solution 5 mL, 5 mL, Oral, Q6H PRN, Bette Denise APRN, 5 mL at 03/14/25 0621    ipratropium-albuterol (DUO-NEB) nebulizer solution 3 mL, 3 mL, Nebulization, 4x Daily - RT, Bette Denise APRN, 3 mL at 03/14/25 2034    Magnesium Standard Dose Replacement - Follow Nurse / BPA Driven  Protocol, , Not Applicable, PRN, Bette Denise APRN    nicotine (NICODERM CQ) 14 MG/24HR patch 1 patch, 1 patch, Transdermal, Nightly, Afshan Buckner APRN, 1 patch at 03/14/25 2041    nicotine polacrilex (NICORETTE) gum 2 mg, 2 mg, Mouth/Throat, Q1H PRN, Afshan Buckner APRN, 2 mg at 03/13/25 2022    nitroglycerin (NITROSTAT) SL tablet 0.4 mg, 0.4 mg, Sublingual, Q5 Min PRN, Bette Denise APRN    ondansetron ODT (ZOFRAN-ODT) disintegrating tablet 4 mg, 4 mg, Oral, Q6H PRN **OR** ondansetron (ZOFRAN) injection 4 mg, 4 mg, Intravenous, Q6H PRN, Bette Denise APRN, 4 mg at 03/14/25 1037    oseltamivir (TAMIFLU) capsule 75 mg, 75 mg, Oral, Q12H, Bette Denise APRN, 75 mg at 03/14/25 2040    oxymetazoline (AFRIN) nasal spray 2 spray, 2 spray, Each Nare, BID, Donald East MD    Phosphorus Replacement - Follow Nurse / BPA Driven Protocol, , Not Applicable, PRN, Bette Denise APRN    potassium chloride (KLOR-CON M10) CR tablet 10 mEq, 10 mEq, Oral, Daily, Bette Denise APRN, 10 mEq at 03/14/25 0845    Potassium Replacement - Follow Nurse / BPA Driven Protocol, , Not Applicable, PRN, Bette Denise APRN    [COMPLETED] Insert Peripheral IV, , , Once **AND** sodium chloride 0.9 % flush 10 mL, 10 mL, Intravenous, PRN, Manuel Sabillon MD    sodium chloride 0.9 % flush 10 mL, 10 mL, Intravenous, Q12H, Bette Denise APRN, 10 mL at 03/14/25 2041    sodium chloride 0.9 % flush 10 mL, 10 mL, Intravenous, PRN, Bette Denise APRN    sodium chloride 0.9 % infusion 40 mL, 40 mL, Intravenous, PRN, Bette Denise, APRN    sodium chloride nasal spray 2 spray, 2 spray, Each Nare, YANET, Donald East MD, 2 spray at 03/14/25 9518  Allergies:  No Known Allergies  Social History:   Social History     Socioeconomic History    Marital status:    Tobacco Use    Smoking status: Every Day     Current packs/day: 1.50     Average packs/day: 1.5 packs/day for 38.4 years (57.6 ttl pk-yrs)     Types: Cigarettes     " Start date: 10/23/1986    Smokeless tobacco: Never   Vaping Use    Vaping status: Never Used   Substance and Sexual Activity    Alcohol use: Not Currently    Drug use: Not Currently    Sexual activity: Not Currently     Partners: Male     Birth control/protection: None     Family History:  Family History   Problem Relation Age of Onset    Heart disease Mother     Diabetes Mother     Cancer Father     Stroke Father     Heart disease Father     Diabetes Father           Review of Systems  See history of present illness and past medical history.  Patient denies headache, dizziness, syncope, falls, trauma, change in vision, change in hearing, change in taste, changes in weight, changes in appetite, focal weakness, numbness, or paresthesia.  Patient denies chest pain, palpitations,  PND,   sinus pressure, rhinorrhea, epistaxis, hemoptysis, nausea, vomiting,hematemesis, diarrhea, constipation or hematochezia. Denies cold or heat intolerance, polydipsia, polyuria, polyphagia. Denies hematuria, pyuria, dysuria, hesitancy, frequency or urgency. Denies consumption of raw and under cooked meats foods or change in water source.  Denies fever, chills, sweats, night sweats.       Vitals:   /80 (BP Location: Right arm, Patient Position: Lying)   Pulse 75   Temp 98.4 °F (36.9 °C) (Oral)   Resp 16   Ht 162.6 cm (64\")   Wt 83.9 kg (185 lb)   SpO2 95%   BMI 31.76 kg/m²   I/O: No intake or output data in the 24 hours ending 03/14/25 1320  Exam:  General Appearance:    Alert, cooperative, no distress, appears stated age; chronically ill appearing   Head:    Normocephalic, without obvious abnormality, atraumatic   Eyes:    PERRL, conjunctivae/corneas clear, EOM's intact, both eyes   Ears:    Normal external ear canals, both ears   Nose:   Nares normal, septum midline, mucosa normal, no drainage    or sinus tenderness   Throat:   Lips, tongue, gums normal; oral mucosa pink and moist   Neck:   Supple, symmetrical, trachea " midline, no adenopathy;     thyroid:  no enlargement/tenderness/nodules; no carotid    bruit or JVD   Back:     Symmetric, no curvature, ROM normal, no CVA tenderness   Lungs:     Decreased breath sounds bilaterally, respirations unlabored   Chest Wall:    No tenderness or deformity    Heart:    Regular rate and rhythm, S1 and S2 normal, no murmur, rub   or gallop   Abdomen:     Soft, nontender, bowel sounds active all four quadrants,     no masses, no hepatomegaly, no splenomegaly   Extremities:   Extremities normal, atraumatic, no cyanosis or edema                Data Review:  Labs in chart were reviewed.  WBC   Date Value Ref Range Status   03/14/2025 10.54 3.40 - 10.80 10*3/mm3 Final     Hemoglobin   Date Value Ref Range Status   03/14/2025 14.5 12.0 - 15.9 g/dL Final     Hematocrit   Date Value Ref Range Status   03/14/2025 44.8 34.0 - 46.6 % Final     Platelets   Date Value Ref Range Status   03/14/2025 187 140 - 450 10*3/mm3 Final     Sodium   Date Value Ref Range Status   03/14/2025 139 136 - 145 mmol/L Final     Potassium   Date Value Ref Range Status   03/14/2025 3.7 3.5 - 5.2 mmol/L Final     Chloride   Date Value Ref Range Status   03/14/2025 101 98 - 107 mmol/L Final     CO2   Date Value Ref Range Status   03/14/2025 26.0 22.0 - 29.0 mmol/L Final     BUN   Date Value Ref Range Status   03/14/2025 12 8 - 23 mg/dL Final     Creatinine   Date Value Ref Range Status   03/14/2025 0.62 0.57 - 1.00 mg/dL Final     Glucose   Date Value Ref Range Status   03/14/2025 94 65 - 99 mg/dL Final     Calcium   Date Value Ref Range Status   03/14/2025 8.5 (L) 8.6 - 10.5 mg/dL Final     AST (SGOT)   Date Value Ref Range Status   03/12/2025 26 1 - 32 U/L Final     ALT (SGPT)   Date Value Ref Range Status   03/12/2025 20 1 - 33 U/L Final     Alkaline Phosphatase   Date Value Ref Range Status   03/12/2025 89 39 - 117 U/L Final               Imaging Results (Last 7 Days)       Procedure Component Value Units Date/Time    CT  Angiogram Chest Pulmonary Embolism [[id number]] Collected: 03/12/25 1916     Updated: 03/12/25 1931    Narrative:      CTA CHEST WITH IV CONTRAST     HISTORY: History of lung cancer, productive cough and dyspnea     COMPARISON: Chest CT 9/15/2022     TECHNIQUE: CT angiography was performed of the chest with axial images  as well as coronal and sagittal reformatted MIP images provided  following administration of IV contrast. 3-D surface rendered reformats  were obtained of the pulmonary arteries and aorta. Radiation dose  reduction techniques were utilized, including automated exposure  control, and exposure modulation based on body size.     FINDINGS:     There is a region of somewhat patchy alveolar infiltrate in the  posterior medial right lower lobe, and there is some associated  bronchial wall thickening/bronchial narrowing. Somewhat similar though  less prominent findings are seen in the right middle lobe as well.     There is no dense pulmonary consolidation, pleural effusion or  pneumothorax.     The thoracic aorta is normal in caliber, and there are coronary  atherosclerotic vascular calcifications. There is no new or otherwise  suspicious mediastinal or hilar adenopathy or mass, though there are  chronic soft tissue changes in the right hilum with irregularity of the  right mainstem and upper lobe bronchus, unchanged, presumably related to  prior therapy.     Images of the upper abdomen show no acute abnormality; a mixed density  partially calcified right adrenal nodule is redemonstrated.  There is no  acute bony abnormality.     Bolus timing is good and there is no evidence of pulmonary embolism.       Impression:         Pulmonary arteries are well-opacified, and there is no evidence of  pulmonary embolism.     New areas of patchy alveolar type infiltrate in the posterior medial  right lower lobe, and to a lesser degree in the right middle lobe, with  associated bronchial thickening.           This report  was finalized on 3/12/2025 7:27 PM by Dr. Immanuel Hunt M.D on Workstation: [id number]       XR Chest 1 View [[id number]] Collected: 03/12/25 1647     Updated: 03/12/25 1652    Narrative:      XR CHEST 1 VW-     HISTORY: Female who is 64 years-old, cough     TECHNIQUE: Frontal view of the chest     COMPARISON: 9/15/2022     FINDINGS: Right chest port appears stable. The heart size is borderline.  Pulmonary vasculature is unremarkable. No focal pulmonary consolidation,  pleural effusion, or pneumothorax. No acute osseous process.       Impression:      No focal pulmonary consolidation. Follow-up as clinical  indications persist.     This report was finalized on 3/12/2025 4:48 PM by Dr. Jose Luis Gregorio M.D on Workstation: [id number]             Past Medical History:   Diagnosis Date    Addiction     ALCOHOL LAST USED 1 YEAR AGO, AMPHETAMINES LAST USED 9/21/22, PILLS PERCOCET LAST USED 06/2022    Afib     APPROX 1 YEAR AGO - [facility]    Allergic     Anxiety     Arthritis     KNEE, RIGHT KNEE    Bipolar 1 disorder     Cancer     LUNG CA/ BRAIN- RADIATION    COPD (chronic obstructive pulmonary disease)     Depression     Diabetes mellitus     DVT, bilateral lower limbs     Hyperlipidemia     Hypertension     Pneumonia     Thyroid disease        Assessment:  Active Hospital Problems    Diagnosis  POA    **Influenza A [J10.1]  Yes    COPD with acute exacerbation [J44.1]  Yes      Resolved Hospital Problems   No resolved problems to display.   Influenza a  Tobacco use  Hypertension  Hypothyroidism  Acute hypoxic respiratory failure    Plan:  Continue tamiflu  Wean oxygen as tolerated  Doppler legs and cta negative for clots  Trend labs  Notes reviewed  Will admit the patient  Thanks for involving us in her care  Patient is full code and daughter is nok    Kayla Parker MD   3/14/2025  22:19 EDT      Electronically signed by Kayla Parker MD at 03/14/25 2096          Discharge Summary         Ibrahima Lundy MD at 25 1019                 NAME: Katharina Torres ADMIT: 3/12/2025   : 1960  PCP: Provider, No Known    MRN: [id number] LOS: 2 days   AGE/SEX: 64 y.o. female  ROOM: [facility]     Date of Admission:  3/12/2025  Date of Discharge:  3/16/2025    PCP: Provider, No Known    CHIEF COMPLAINT  Cough      DISCHARGE DIAGNOSIS  Active Hospital Problems    Diagnosis  POA    **Influenza A [J10.1]  Yes    COPD with acute exacerbation [J44.1]  Yes    Hypothyroidism (acquired) [E03.9]  Yes    Primary hypertension [I10]  Yes    Tobacco abuse [Z72.0]  Yes    Chronic narcotic use [F11.90]  Yes      Resolved Hospital Problems   No resolved problems to display.       SECONDARY DIAGNOSES  Past Medical History:   Diagnosis Date    Addiction     ALCOHOL LAST USED 1 YEAR AGO, AMPHETAMINES LAST USED 22, PILLS PERCOCET LAST USED 2022    Afib     APPROX 1 YEAR AGO - [facility]    Allergic     Anxiety     Arthritis     KNEE, RIGHT KNEE    Bipolar 1 disorder     Cancer     LUNG CA/ BRAIN- RADIATION    COPD (chronic obstructive pulmonary disease)     Depression     Diabetes mellitus     DVT, bilateral lower limbs     Hyperlipidemia     Hypertension     Pneumonia     Thyroid disease        CONSULTS   Pulmonary    HOSPITAL COURSE  63 yo with history of COPD, HLD, HTN, bipolar disorder, metastatic lung cancer, tobacco use who presented with cough, dyspnea. Diagnosed with influenza. Treated with bronchodilators, oxygen, tamiflu. She is clinically improved and feels well to [place] today. She did qualify for oxygen with exertion- likely related to her COPD and lung issues along with the flu. Can follow up with PCP/Pulmonary as an outpatient to see if this is needed more long term or just in the short term. May end up having a post viral cough will Rx some cough medications.     I reviewed the walking Ox done by RT and agree pt will need oxygen for home use.     DIAGNOSTICS    2025 1724 2025  1759 Basic Metabolic Panel [[id number]]    (Abnormal)   Blood from Arm, Left    Final result Component Value Units   Glucose 94 mg/dL   BUN 12 mg/dL   Creatinine 0.62 mg/dL   Sodium 139 mmol/L   Potassium 3.7 mmol/L   Chloride 101 mmol/L   CO2 26.0 mmol/L   Calcium 8.5 Low  mg/dL   BUN/Creatinine Ratio 19.4    Anion Gap 12.0 mmol/L   eGFR 100.8 mL/min/1.73          03/14/2025 1724 03/14/2025 1735 CBC Auto Differential [[id number]]   (Abnormal)   Blood from Arm, Left    Final result Component Value Units   WBC 10.54 10*3/mm3   RBC 5.09 10*6/mm3   Hemoglobin 14.5 g/dL   Hematocrit 44.8 %   MCV 88.0 fL   MCH 28.5 pg   MCHC 32.4 g/dL   RDW 13.1 %   RDW-SD 42.3 fl   MPV 9.7 fL   Platelets 187 10*3/mm3   Neutrophil % 70.9 %   Lymphocyte % 21.3 %   Monocyte % 7.0 %   Eosinophil % 0.3 %   Basophil % 0.1 %   Immature Grans % 0.4 %   Neutrophils, Absolute 7.47 High  10*3/mm3         Duplex Venous Lower Extremity - Bilateral CAR [[id number]] Aric as Reviewed   Order Status: Completed Resulted: 03/13/25 1611    Updated: 03/13/25 1611    Right Common Femoral Spont Y    Right Common Femoral Competent Y    Right Common Femoral Phasic Y    Right Common Femoral Compress C    Right Common Femoral Augment Y    Right Saphenofemoral Junction Compress C    Right Profunda Femoral Compress C    Right Proximal Femoral Compress C    Right Mid Femoral Spont Y    Right Mid Femoral Competent Y    Right Mid Femoral Phasic Y    Right Mid Femoral Compress C    Right Mid Femoral Augment Y    Right Distal Femoral Compress C    Right Popliteal Spont Y    Right Popliteal Competent Y    Right Popliteal Phasic Y    Right Popliteal Compress C    Right Popliteal Augment Y    Right Posterior Tibial Compress C    Right Peroneal Compress C    Right Gastronemius Compress C    Right Greater Saph AK Compress C    Right Greater Saph BK Compress C    Right Lesser Saph Compress C    Left Common Femoral Spont Y    Left Common Femoral Competent Y    Left Common Femoral  Phasic Y    Left Common Femoral Compress C    Left Common Femoral Augment Y    Left Saphenofemoral Junction Compress C    Left Profunda Femoral Compress C    Left Proximal Femoral Compress C    Left Mid Femoral Spont Y    Left Mid Femoral Competent Y    Left Mid Femoral Phasic Y    Left Mid Femoral Compress C    Left Mid Femoral Augment Y    Left Distal Femoral Compress C    Left Popliteal Spont Y    Left Popliteal Competent Y    Left Popliteal Phasic Y    Left Popliteal Compress C    Left Popliteal Augment Y    Left Posterior Tibial Compress C    Left Peroneal Compress C    Left Gastronemius Compress C    Left Greater Saph AK Compress C    Left Greater Saph BK Compress C    Left Lesser Saph Compress C    BH CV VAS PRELIMINARY FINDINGS SCRIPTING 1.0   Narrative:       Normal bilateral lower extremity venous duplex scan.    CT Angiogram Chest Pulmonary Embolism [[id number]] Aric as Reviewed   Order Status: Completed Collected: 03/12/25 1916    Updated: 03/12/25 1931   Narrative:     CTA CHEST WITH IV CONTRAST     HISTORY: History of lung cancer, productive cough and dyspnea     COMPARISON: Chest CT 9/15/2022     TECHNIQUE: CT angiography was performed of the chest with axial images  as well as coronal and sagittal reformatted MIP images provided  following administration of IV contrast. 3-D surface rendered reformats  were obtained of the pulmonary arteries and aorta. Radiation dose  reduction techniques were utilized, including automated exposure  control, and exposure modulation based on body size.     FINDINGS:     There is a region of somewhat patchy alveolar infiltrate in the  posterior medial right lower lobe, and there is some associated  bronchial wall thickening/bronchial narrowing. Somewhat similar though  less prominent findings are seen in the right middle lobe as well.     There is no dense pulmonary consolidation, pleural effusion or  pneumothorax.     The thoracic aorta is normal in caliber, and there  "are coronary  atherosclerotic vascular calcifications. There is no new or otherwise  suspicious mediastinal or hilar adenopathy or mass, though there are  chronic soft tissue changes in the right hilum with irregularity of the  right mainstem and upper lobe bronchus, unchanged, presumably related to  prior therapy.     Images of the upper abdomen show no acute abnormality; a mixed density  partially calcified right adrenal nodule is redemonstrated.  There is no  acute bony abnormality.     Bolus timing is good and there is no evidence of pulmonary embolism.      Impression:        Pulmonary arteries are well-opacified, and there is no evidence of  pulmonary embolism.     New areas of patchy alveolar type infiltrate in the posterior medial  right lower lobe, and to a lesser degree in the right middle lobe, with  associated bronchial thickening.             PHYSICAL EXAM  Objective:  Vital signs: (most recent): Blood pressure 133/90, pulse 70, temperature 97.9 °F (36.6 °C), temperature source Oral, resp. rate 16, height 162.6 cm (64\"), weight 79.4 kg (175 lb 1.6 oz), SpO2 99%, not currently breastfeeding.                Alert  nad  No resp distress  Lungs fairly clear without wheeze  Chronically ill     CONDITION ON DISCHARGE  Stable.      DISCHARGE DISPOSITION   Home or Self Care      DISCHARGE MEDICATIONS       Your medication list        START taking these medications        Instructions Last Dose Given Next Dose Due   benzonatate 200 MG capsule  Commonly known as: TESSALON      Take 1 capsule by mouth 3 (Three) Times a Day As Needed for Cough.       budesonide-formoterol 160-4.5 MCG/ACT inhaler  Commonly known as: SYMBICORT      Inhale 2 puffs 2 (Two) Times a Day.       dextromethorphan polistirex ER 30 MG/5ML Suspension Extended Release oral suspension  Commonly known as: DELSYM      Take 10 mL by mouth Every 12 (Twelve) Hours.       oseltamivir 75 MG capsule  Commonly known as: TAMIFLU      Take 1 capsule by " mouth Every 12 (Twelve) Hours for 2 doses. Indications: Influenza A Virus Infection              CONTINUE taking these medications        Instructions Last Dose Given Next Dose Due   albuterol 1.25 MG/3ML nebulizer solution  Commonly known as: ACCUNEB      Take 3 mL by nebulization Every 6 (Six) Hours As Needed for Wheezing.       albuterol sulfate  (90 Base) MCG/ACT inhaler  Commonly known as: PROVENTIL HFA;VENTOLIN HFA;PROAIR HFA      INHALE 2 PUFFS BY MOUTH EVERY 6 HOURS AS NEEDED FOR SHORTNESS OF AIR       ALPRAZolam 1 MG tablet  Commonly known as: XANAX      Take 1 tablet by mouth 3 (Three) Times a Day.       atorvastatin 40 MG tablet  Commonly known as: LIPITOR      Take 1 tablet by mouth Daily.       buprenorphine-naloxone 8-2 MG per SL tablet  Commonly known as: SUBOXONE      Place 1 tablet under the tongue Daily. Instructed to stay on per Dr. Haynes and Dr. Shaw       citalopram 20 MG tablet  Commonly known as: CeleXA      Take 1 tablet by mouth Daily.       docusate sodium 100 MG capsule  Commonly known as: COLACE      Take 1 capsule by mouth 2 (Two) Times a Day As Needed for Constipation.       furosemide 40 MG tablet  Commonly known as: LASIX      Take 2 tablets by mouth 2 (Two) Times a Day.       hydrOXYzine 25 MG tablet  Commonly known as: ATARAX      Take 1 tablet by mouth 3 (Three) Times a Day As Needed for Itching.       levothyroxine 175 MCG tablet  Commonly known as: SYNTHROID, LEVOTHROID      Take 150 mcg by mouth Daily.       metFORMIN 500 MG tablet  Commonly known as: GLUCOPHAGE      Take 1 tablet by mouth 2 (Two) Times a Day With Meals.       ondansetron 4 MG tablet  Commonly known as: ZOFRAN      Take 1 tablet by mouth Every 8 (Eight) Hours As Needed for Nausea or Vomiting.       potassium chloride 10 MEQ CR tablet      Take 1 tablet by mouth Daily.       traZODone 150 MG tablet  Commonly known as: DESYREL      Take 1 tablet by mouth Every Night.              STOP taking these  medications      lisinopril 20 MG tablet  Commonly known as: PRINIVIL,ZESTRIL        predniSONE 20 MG tablet  Commonly known as: DELTASONE                  Where to Get Your Medications        These medications were sent to [facility]  [address] [facility] [id number]      Hours: Monday to Friday 7 AM to 6 PM, Saturday & Sunday 8 AM to 4:30 PM (Closed 12 PM to 12:30 PM) Phone: [phone]   benzonatate 200 MG capsule  budesonide-formoterol 160-4.5 MCG/ACT inhaler  dextromethorphan polistirex ER 30 MG/5ML Suspension Extended Release oral suspension  oseltamivir 75 MG capsule        No future appointments.   Follow-up Information       Provider, No Known .    Contact information:  [place] [id number] [phone]                             TEST  RESULTS PENDING AT DISCHARGE  Pending Labs       Order Current Status    Blood Culture - Blood, Arm, Right Preliminary result    Blood Culture - Blood, Arm, Right Preliminary result               Ibrahima Lundy MD  [facility]  03/16/25  10:19 EDT      Time: greater than 32 minutes on discharge  It was a pleasure taking care of this patient while in the hospital.       Electronically signed by Ibrahima Lundy MD at 03/16/25 7302

## 2025-03-17 NOTE — OUTREACH NOTE
Prep Survey      Flowsheet Row Responses   [facility] facility patient discharged from? [place]   Is LACE score < 7 ? No   Eligibility Readm Mgmt   Discharge diagnosis Influenza A (J10.1)     Yes    COPD with acute exacerbation   Does the patient have one of the following disease processes/diagnoses(primary or secondary)? COPD   Does the patient have Home health ordered? No   Is there a DME ordered? Yes   What DME was ordered? [facility]   Prep survey completed? Yes            PHIL MAGAÑA - Registered Nurse

## 2025-03-20 ENCOUNTER — READMISSION MANAGEMENT (OUTPATIENT)
Dept: CALL CENTER | Facility: HOSPITAL | Age: 65
End: 2025-03-20
Payer: MEDICARE

## 2025-03-20 NOTE — OUTREACH NOTE
COPD/PN Week 1 Survey      Flowsheet Row Responses   [facility] facility patient discharged from? [place]   Does the patient have one of the following disease processes/diagnoses(primary or secondary)? COPD   Week 1 attempt successful? No   Unsuccessful attempts Attempt 1            Rita Calhoun Registered Nurse

## 2025-03-26 ENCOUNTER — READMISSION MANAGEMENT (OUTPATIENT)
Dept: CALL CENTER | Facility: HOSPITAL | Age: 65
End: 2025-03-26
Payer: MEDICARE

## 2025-03-26 NOTE — OUTREACH NOTE
COPD/PN Week 1 Survey      Flowsheet Row Responses   [facility] facility patient discharged from? [place]   Does the patient have one of the following disease processes/diagnoses(primary or secondary)? COPD   Week 1 attempt successful? No   Unsuccessful attempts Attempt 2   Discharge diagnosis Influenza A (J10.1)     Yes    COPD with acute exacerbation            TALHA BARBOUR - Registered Nurse

## 2025-04-11 ENCOUNTER — READMISSION MANAGEMENT (OUTPATIENT)
Dept: CALL CENTER | Facility: HOSPITAL | Age: 65
End: 2025-04-11
Payer: MEDICARE

## 2025-04-11 NOTE — OUTREACH NOTE
COPD/PN Week 3 Survey      Flowsheet Row Responses   [facility] facility patient discharged from? [place]   Does the patient have one of the following disease processes/diagnoses(primary or secondary)? COPD   Week 3 attempt successful? No   Unsuccessful attempts Attempt 1   oke Vy Calhoun Registered Nurse

## (undated) DEVICE — PULLOVER TOGA, 2X LARGE: Brand: FLYTE, SURGICOOL

## (undated) DEVICE — DRSNG PAD ABD 8X10IN STRL

## (undated) DEVICE — TOTAL KNEE-LF: Brand: MEDLINE INDUSTRIES, INC.

## (undated) DEVICE — BNDG ELAS MATRX V/CLS 6INX10YD LF

## (undated) DEVICE — BASIC SINGLE BASIN-LF: Brand: MEDLINE INDUSTRIES, INC.

## (undated) DEVICE — MAT FLR ABS W/BLU/LINER 56X72IN WHT

## (undated) DEVICE — CVR LEG BOOTLEG F/R NOSKID 33IN

## (undated) DEVICE — SUT ETHLN 3/0 FS1 663G

## (undated) DEVICE — FAN SPRAY KIT: Brand: PULSAVAC®

## (undated) DEVICE — 450 ML BOTTLE OF 0.05% CHLORHEXIDINE GLUCONATE IN 99.95% STERILE WATER FOR IRRIGATION, USP AND APPLICATOR.: Brand: IRRISEPT ANTIMICROBIAL WOUND LAVAGE

## (undated) DEVICE — PROXIMATE RH ROTATING HEAD SKIN STAPLERS (35 WIDE) CONTAINS 35 STAINLESS STEEL STAPLES: Brand: PROXIMATE

## (undated) DEVICE — PENCL E/S SMOKEEVAC W/TELESCP CANN

## (undated) DEVICE — APPL CHLORAPREP HI/LITE 26ML ORNG

## (undated) DEVICE — ELECTRD BLD EDGE COAT 3IN

## (undated) DEVICE — NDL HYPO ECLPS SFTY 18G 1 1/2IN

## (undated) DEVICE — DRSNG GZ PETROLTM XEROFORM CURAD 1X8IN STRL

## (undated) DEVICE — DISPOSABLE TOURNIQUET CUFF SINGLE BLADDER, SINGLE PORT AND QUICK CONNECT CONNECTOR: Brand: COLOR CUFF

## (undated) DEVICE — NO-SCRATCH ™ SMALL WHITNEY CURETTE ™ IS A SINGLE-USE, PLASTIC CURETTE FOR QUICKLY APPLYING, MANIPULATING AND REMOVING BONE CEMENT DURING HIP AND KNEE REPLACEMENT SURGERY. THE PLASTIC IS SOFTER THAN STEEL INSTRUMENTS, REDUCING THE RISK OF DAMAGING THE PROSTHESIS WITH METAL INSTRUMENTS.  THE CURETTE’S 6MM TIP REMOVES EXCESS CEMENT FROM REPLACEMENT HIPS AND KNEES. EASY-TO-MANEUVER, THE SMALL BLUE CURETTE LETS YOU REMOVE CEMENT FROM ALL EDGES OF THE PROSTHESIS.NO-SCRATCH WHITNEY SMALL CURETTE FEATURES:SAFER THAN STEEL- MADE OF PLASTIC - STURDY YET SOFTER THAN SURGICAL STEEL.HANDIER- EACH TOOL HAS A MOLDED-IN THUMB INDENTATION INSTANTLY ORIENTING THE TOOL.- EASIER TO MANEUVER IN HARD TO SEE PLACES.- COLOR-CODED FOR EASY IDENTIFICATION.FASTER- COMES INDIVIDUALLY PACKAGED IN STERILE, PEEL OPEN POUCH, READY TO GO.- APPLIES, MANIPULATES, OR REMOVES CEMENT WITH FINGERTIP PRECISION.ECONOMICAL- THE COST OF A SINGLE REVISION DWARFS THE COST OF A SINGLE-USE CURETTE. - DISPOSABLE – THERE’S NO NEED TO WASTE TIME REMOVING HARDENED CEMENT OR RE-STERILIZING TOOLS.- LESS EXPENSIVE TO BUY AND INVENTORY - ORDER ONLY THE TOOL YOU USE.- PACKAGED 25 INDIVIDUALLY WRAPPED TOOLS TO A CARTON FOR CONVENIENT SHELF STORAGE.: Brand: WHITNEY NO-SCRATCH CURETTE (SMALL)

## (undated) DEVICE — ESMARK: Brand: DEROYAL

## (undated) DEVICE — STERILE POLYISOPRENE POWDER-FREE SURGICAL GLOVES: Brand: PROTEXIS

## (undated) DEVICE — UNDERCAST PADDING: Brand: DEROYAL

## (undated) DEVICE — SLV SCD KN/LEN ADJ EXPRSS BLENDED MD 1P/U

## (undated) DEVICE — TOWEL,OR,DSP,ST,BLUE,STD,4/PK,20PK/CS: Brand: MEDLINE

## (undated) DEVICE — INTENDED FOR TISSUE SEPARATION, AND OTHER PROCEDURES THAT REQUIRE A SHARP SURGICAL BLADE TO PUNCTURE OR CUT.: Brand: BARD-PARKER ® CARBON RIB-BACK BLADES

## (undated) DEVICE — STRYKER PERFORMANCE SERIES SAGITTAL BLADE: Brand: STRYKER PERFORMANCE SERIES

## (undated) DEVICE — STERILE POLYISOPRENE POWDER-FREE SURGICAL GLOVES WITH EMOLLIENT COATING: Brand: PROTEXIS

## (undated) DEVICE — GLV SURG SENSICARE PI ORTHO SZ8 LF STRL

## (undated) DEVICE — COVER,LIGHT HANDLE,FLX,1/PK: Brand: MEDLINE INDUSTRIES, INC.

## (undated) DEVICE — GAUZE,SPONGE,4"X4",16PLY,STRL,LF,10/TRAY: Brand: MEDLINE

## (undated) DEVICE — SUT VIC 2/0 CT1 36IN

## (undated) DEVICE — STERILE PATIENT PROTECTIVE PAD FOR IMP® KNEE POSITIONERS & COHESIVE WRAP (10 / CASE): Brand: DE MAYO KNEE POSITIONER®

## (undated) DEVICE — DRSNG SURESITE WNDW 4X4.5

## (undated) DEVICE — PEEL-AWAY TOGA, 2X LARGE: Brand: FLYTE

## (undated) DEVICE — 3M™ STERI-DRAPE™ U-DRAPE 1015: Brand: STERI-DRAPE™

## (undated) DEVICE — SOL IRR NACL 0.9PCT 3000ML

## (undated) DEVICE — ANTIBACTERIAL VIOLET BRAIDED (POLYGLACTIN 910), SYNTHETIC ABSORBABLE SURGICAL SUTURE: Brand: COATED VICRYL

## (undated) DEVICE — SYR LUERLOK 30CC

## (undated) DEVICE — 3 BONE CEMENT MIXER: Brand: MIXEVAC

## (undated) DEVICE — DRP ROBOTIC ROSA BX/20